# Patient Record
Sex: FEMALE | Race: WHITE
[De-identification: names, ages, dates, MRNs, and addresses within clinical notes are randomized per-mention and may not be internally consistent; named-entity substitution may affect disease eponyms.]

---

## 2017-07-06 ENCOUNTER — HOSPITAL ENCOUNTER (INPATIENT)
Dept: HOSPITAL 47 - EC | Age: 82
LOS: 4 days | Discharge: NURSING, REHAB OR LONG-TERM CARE | DRG: 699 | End: 2017-07-10
Payer: MEDICARE

## 2017-07-06 VITALS — BODY MASS INDEX: 35.8 KG/M2

## 2017-07-06 DIAGNOSIS — Z86.14: ICD-10-CM

## 2017-07-06 DIAGNOSIS — Z79.4: ICD-10-CM

## 2017-07-06 DIAGNOSIS — T83.83XA: Primary | ICD-10-CM

## 2017-07-06 DIAGNOSIS — E11.51: ICD-10-CM

## 2017-07-06 DIAGNOSIS — Z79.899: ICD-10-CM

## 2017-07-06 DIAGNOSIS — N39.0: ICD-10-CM

## 2017-07-06 DIAGNOSIS — M19.042: ICD-10-CM
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
## 2017-07-06 DIAGNOSIS — Z79.82: ICD-10-CM

## 2017-07-06 DIAGNOSIS — Z79.84: ICD-10-CM

## 2017-07-06 DIAGNOSIS — E78.2: ICD-10-CM

## 2017-07-06 DIAGNOSIS — E11.42: ICD-10-CM

## 2017-07-06 DIAGNOSIS — R32: ICD-10-CM

## 2017-07-06 DIAGNOSIS — T83.518A: ICD-10-CM

## 2017-07-06 DIAGNOSIS — Z79.01: ICD-10-CM

## 2017-07-06 DIAGNOSIS — I10: ICD-10-CM
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
## 2017-07-06 DIAGNOSIS — Z85.41: ICD-10-CM

## 2017-07-06 DIAGNOSIS — I48.0: ICD-10-CM

## 2017-07-06 DIAGNOSIS — M19.041: ICD-10-CM

## 2017-07-06 LAB
ALP SERPL-CCNC: 116 U/L (ref 38–126)
ALT SERPL-CCNC: 22 U/L (ref 9–52)
ANION GAP SERPL CALC-SCNC: 10 MMOL/L
APTT BLD: 36.2 SEC (ref 22–30)
AST SERPL-CCNC: 18 U/L (ref 14–36)
BASOPHILS # BLD AUTO: 0.1 K/UL (ref 0–0.2)
BASOPHILS NFR BLD AUTO: 1 %
BUN SERPL-SCNC: 42 MG/DL (ref 7–17)
CALCIUM SPEC-MCNC: 9.1 MG/DL (ref 8.4–10.2)
CH: 27.5
CHCM: 32.3
CHLORIDE SERPL-SCNC: 102 MMOL/L (ref 98–107)
CO2 SERPL-SCNC: 23 MMOL/L (ref 22–30)
EOSINOPHIL # BLD AUTO: 0.4 K/UL (ref 0–0.7)
EOSINOPHIL NFR BLD AUTO: 2 %
ERYTHROCYTE [DISTWIDTH] IN BLOOD BY AUTOMATED COUNT: 3.89 M/UL (ref 3.8–5.4)
ERYTHROCYTE [DISTWIDTH] IN BLOOD: 14.8 % (ref 11.5–15.5)
GLUCOSE SERPL-MCNC: 263 MG/DL (ref 74–99)
HCT VFR BLD AUTO: 33.3 % (ref 34–46)
HDW: 2.38
HGB BLD-MCNC: 11 GM/DL (ref 11.4–16)
INR PPP: 2.8 (ref ?–1.1)
LUC NFR BLD AUTO: 2 %
LYMPHOCYTES # SPEC AUTO: 2.9 K/UL (ref 1–4.8)
LYMPHOCYTES NFR SPEC AUTO: 18 %
MCH RBC QN AUTO: 28.2 PG (ref 25–35)
MCHC RBC AUTO-ENTMCNC: 32.9 G/DL (ref 31–37)
MCV RBC AUTO: 85.5 FL (ref 80–100)
MONOCYTES # BLD AUTO: 1.1 K/UL (ref 0–1)
MONOCYTES NFR BLD AUTO: 7 %
NEUTROPHILS # BLD AUTO: 11.5 K/UL (ref 1.3–7.7)
NEUTROPHILS NFR BLD AUTO: 71 %
NON-AFRICAN AMERICAN GFR(MDRD): 37
PARTICLE COUNT: (no result)
POTASSIUM SERPL-SCNC: 4.2 MMOL/L (ref 3.5–5.1)
PROT SERPL-MCNC: 8.1 G/DL (ref 6.3–8.2)
PT BLD: 26.8 SEC (ref 9–12)
RBC UR QL: >182 /HPF (ref 0–5)
SODIUM SERPL-SCNC: 135 MMOL/L (ref 137–145)
SQUAMOUS UR QL AUTO: 10 /HPF (ref 0–4)
UA BILLING (MACRO VS. MICRO): (no result)
WBC # BLD AUTO: 0.25 10*3/UL
WBC # BLD AUTO: 16.1 K/UL (ref 3.8–10.6)
WBC #/AREA URNS HPF: >182 /HPF (ref 0–5)
WBC (PEROX): 15.63

## 2017-07-06 PROCEDURE — 36415 COLL VENOUS BLD VENIPUNCTURE: CPT

## 2017-07-06 PROCEDURE — 99285 EMERGENCY DEPT VISIT HI MDM: CPT

## 2017-07-06 PROCEDURE — 85610 PROTHROMBIN TIME: CPT

## 2017-07-06 PROCEDURE — 81001 URINALYSIS AUTO W/SCOPE: CPT

## 2017-07-06 PROCEDURE — 96365 THER/PROPH/DIAG IV INF INIT: CPT

## 2017-07-06 PROCEDURE — 83036 HEMOGLOBIN GLYCOSYLATED A1C: CPT

## 2017-07-06 PROCEDURE — 80048 BASIC METABOLIC PNL TOTAL CA: CPT

## 2017-07-06 PROCEDURE — 85730 THROMBOPLASTIN TIME PARTIAL: CPT

## 2017-07-06 PROCEDURE — 85025 COMPLETE CBC W/AUTO DIFF WBC: CPT

## 2017-07-06 PROCEDURE — 80053 COMPREHEN METABOLIC PANEL: CPT

## 2017-07-07 LAB
ALP SERPL-CCNC: 106 U/L (ref 38–126)
ALT SERPL-CCNC: 23 U/L (ref 9–52)
ANION GAP SERPL CALC-SCNC: 11 MMOL/L
AST SERPL-CCNC: 16 U/L (ref 14–36)
BASOPHILS # BLD AUTO: 0.1 K/UL (ref 0–0.2)
BASOPHILS NFR BLD AUTO: 1 %
BUN SERPL-SCNC: 35 MG/DL (ref 7–17)
CALCIUM SPEC-MCNC: 8.9 MG/DL (ref 8.4–10.2)
CH: 26.8
CHCM: 31.5
CHLORIDE SERPL-SCNC: 108 MMOL/L (ref 98–107)
CO2 SERPL-SCNC: 20 MMOL/L (ref 22–30)
EOSINOPHIL # BLD AUTO: 0.2 K/UL (ref 0–0.7)
EOSINOPHIL NFR BLD AUTO: 2 %
ERYTHROCYTE [DISTWIDTH] IN BLOOD BY AUTOMATED COUNT: 3.71 M/UL (ref 3.8–5.4)
ERYTHROCYTE [DISTWIDTH] IN BLOOD: 14.6 % (ref 11.5–15.5)
GLUCOSE BLD-MCNC: 145 MG/DL (ref 75–99)
GLUCOSE BLD-MCNC: 159 MG/DL (ref 75–99)
GLUCOSE BLD-MCNC: 179 MG/DL (ref 75–99)
GLUCOSE BLD-MCNC: 184 MG/DL (ref 75–99)
GLUCOSE BLD-MCNC: 207 MG/DL (ref 75–99)
GLUCOSE SERPL-MCNC: 167 MG/DL (ref 74–99)
HCT VFR BLD AUTO: 31.7 % (ref 34–46)
HDW: 2.36
HEMOGLOBIN A1C: 7.9 % (ref 4.2–6.1)
HGB BLD-MCNC: 10.6 GM/DL (ref 11.4–16)
LUC NFR BLD AUTO: 2 %
LYMPHOCYTES # SPEC AUTO: 2.5 K/UL (ref 1–4.8)
LYMPHOCYTES NFR SPEC AUTO: 21 %
MCH RBC QN AUTO: 28.5 PG (ref 25–35)
MCHC RBC AUTO-ENTMCNC: 33.4 G/DL (ref 31–37)
MCV RBC AUTO: 85.6 FL (ref 80–100)
MONOCYTES # BLD AUTO: 0.8 K/UL (ref 0–1)
MONOCYTES NFR BLD AUTO: 7 %
NEUTROPHILS # BLD AUTO: 8.2 K/UL (ref 1.3–7.7)
NEUTROPHILS NFR BLD AUTO: 69 %
NON-AFRICAN AMERICAN GFR(MDRD): 45
POTASSIUM SERPL-SCNC: 4.2 MMOL/L (ref 3.5–5.1)
PROT SERPL-MCNC: 7.5 G/DL (ref 6.3–8.2)
SODIUM SERPL-SCNC: 139 MMOL/L (ref 137–145)
WBC # BLD AUTO: 0.2 10*3/UL
WBC # BLD AUTO: 11.9 K/UL (ref 3.8–10.6)
WBC (PEROX): 12.16

## 2017-07-07 RX ADMIN — PRAVASTATIN SODIUM SCH MG: 80 TABLET ORAL at 21:24

## 2017-07-07 RX ADMIN — INSULIN LISPRO SCH UNIT: 100 INJECTION, SOLUTION INTRAVENOUS; SUBCUTANEOUS at 17:16

## 2017-07-07 RX ADMIN — LEVOTHYROXINE SODIUM SCH MCG: 50 TABLET ORAL at 21:24

## 2017-07-07 RX ADMIN — THERA TABS SCH EACH: TAB at 08:16

## 2017-07-07 RX ADMIN — LISINOPRIL SCH MG: 10 TABLET ORAL at 21:24

## 2017-07-07 RX ADMIN — ASPIRIN 81 MG CHEWABLE TABLET SCH MG: 81 TABLET CHEWABLE at 21:24

## 2017-07-07 RX ADMIN — INSULIN LISPRO SCH UNIT: 100 INJECTION, SOLUTION INTRAVENOUS; SUBCUTANEOUS at 12:34

## 2017-07-07 RX ADMIN — CEFAZOLIN SCH MLS/HR: 330 INJECTION, POWDER, FOR SOLUTION INTRAMUSCULAR; INTRAVENOUS at 00:52

## 2017-07-07 RX ADMIN — WARFARIN SODIUM SCH MG: 3 TABLET ORAL at 16:55

## 2017-07-07 RX ADMIN — AMILORIDE HYDROCHLORIDE AND HYDROCHLOROTHIAZIDE SCH EACH: 5; 50 TABLET ORAL at 08:16

## 2017-07-07 RX ADMIN — INSULIN LISPRO SCH UNIT: 100 INJECTION, SOLUTION INTRAVENOUS; SUBCUTANEOUS at 08:21

## 2017-07-07 RX ADMIN — INSULIN LISPRO SCH UNIT: 100 INJECTION, SOLUTION INTRAVENOUS; SUBCUTANEOUS at 21:24

## 2017-07-07 RX ADMIN — LISINOPRIL SCH MG: 10 TABLET ORAL at 08:16

## 2017-07-07 RX ADMIN — METOPROLOL TARTRATE SCH MG: 50 TABLET, FILM COATED ORAL at 21:24

## 2017-07-07 RX ADMIN — CEFAZOLIN SCH: 330 INJECTION, POWDER, FOR SOLUTION INTRAMUSCULAR; INTRAVENOUS at 11:26

## 2017-07-07 RX ADMIN — METOPROLOL TARTRATE SCH MG: 50 TABLET, FILM COATED ORAL at 08:16

## 2017-07-07 NOTE — P.HPIM
History of Present Illness


H&P Date: 17


Chief Complaint: Blood and urine





This is a 82-year-old female well-known to me with past medical history noted 

below significant for urinary retention with chronic indwelling Perez catheter 

that presented to the emergency yesterday with blood in her urine.  Patient 

herself is a very poor historian and most of the history was obtained by chart 

review and nursing staff report.  Patient was doing fairly well few days ago 

and yesterday noted large amount of blood in her Preez bag.  Blood was bright 

red.  Patient denies any abdominal pain, fevers or chills, or nausea.  She was 

brought into the emergency room for further evaluation.  She was noted to have 

significant hematuria but her hemoglobin was stable.  Patient remained 

hemodynamically stable.  She is awake and alert.  She was seen and evaluated by 

urology earlier and plan is to exchange the catheter tube larger caliber 

catheter.





Review of Systems





Review of system:


14 points review of systems were obtained and were negative except to what were 

mentioned in the HPI.





Past Medical History


Past Medical History: Atrial Fibrillation, Diabetes Mellitus, Hyperlipidemia, 

Hypertension, Thyroid Disorder


Additional Past Medical History / Comment(s): peripheral neuropathy, UTIs, 

urinary incontinence, OA bilateral hands and back, PVD, cervical cancer


History of Any Multi-Drug Resistant Organisms: MRSA


Date of last positivie culture/infection: 2016


MDRO Source:: RIGHT FOOT


Past Surgical History: Hysterectomy, Tonsillectomy


Additional Past Surgical History / Comment(s): R leg bypass surgery, bilateral 

cataract removal


Past Anesthesia/Blood Transfusion Reactions: No Reported Reaction


Past Psychological History: No Psychological Hx Reported


Smoking Status: Never smoker


Past Alcohol Use History: None Reported


Past Drug Use History: None Reported





- Past Family History


  ** Mother


Family Medical History: CVA/TIA


Additional Family Medical History / Comment(s): Mother  in her 70's.





  ** Father


Family Medical History: Hypertension


Additional Family Medical History / Comment(s): Father  in his 70's.





Medications and Allergies


 Home Medications











 Medication  Instructions  Recorded  Confirmed  Type


 


Aspirin 81 mg PO HS 16 History


 


Levothyroxine Sodium [Synthroid] 50 mcg PO HS 16 History


 


Pravastatin Sodium [Pravachol] 80 mg PO HS 16 History


 


Warfarin [Coumadin] 3 mg PO MOTUWEFRSA 16 History


 


aMILoride-HCTZ 5-50 mg [Moduretic 1 tab PO QAM 16 History





5-50]    


 


amLODIPine [Norvasc] 5 mg PO DAILY 16 History


 


INSULIN LISPRO (humaLOG) [humaLOG See Protocol SQ ACHS 16 History





(formulary)]    


 


Lisinopril [Zestril] 10 mg PO BID 16 History


 


Acetaminophen Tab [Tylenol Tab] 650 mg PO Q4H PRN 17 History


 


Folic Acid/Multivit-Min/Lutein 1 tab PO DAILY 17 History





[Therapeutic-M Tablet]    


 


Insulin Glargine [Lantus] 25 unit SQ HS 17 History


 


Warfarin Sodium [Coumadin] 4 mg PO SUTH 17 History











 Allergies











Allergy/AdvReac Type Severity Reaction Status Date / Time


 


No Known Allergies Allergy   Verified 17 21:58














Physical Exam


Vitals: 


 Vital Signs











  Temp Pulse Pulse Resp BP BP Pulse Ox


 


 17 07:00  98.3 F   78  18   128/62  97


 


 17 02:42  97.9 F   78  20   142/67  100


 


 17 01:21  98.1 F  92   20  133/63   98


 


 17 00:54  98.4 F  84   18  141/65   100


 


 17 23:33   68   18  139/66   94 L


 


 17 21:19  98.1 F  87   18  150/71   100








 Intake and Output











 17





 22:59 06:59 14:59


 


Intake Total   200


 


Output Total  1000 


 


Balance  -1000 200


 


Intake:   


 


  Oral   200


 


Output:   


 


  Urine  1000 


 


Other:   


 


  Voiding Method Indwelling Catheter Indwelling Catheter 


 


  Weight 86.183 kg 90.718 kg 














General:  The patient is awake and alert, in no distress


Eye: there is normal conjunctiva bilaterally.  


Neck:  The neck is supple, there is no  JVD.  


Cardiovascular:  Normal  S1-S2, no S3-S4, no murmurs.


Respiratory: Lungs clear to auscultation bilaterally


Gastrointestinal: Abdomen is soft, nontender


Musculoskeletal:  There is no pedal edema.  


Neurological:. Speech is normal. 


Skin:  Skin is warm and dry 





Results


CBC & Chem 7: 


 17 08:00





 17 08:00


Labs: 


 Abnormal Lab Results - Last 24 Hours (Table)











  17 Range/Units





  22:20 22:20 22:20 


 


WBC  16.1 H    (3.8-10.6)  k/uL


 


RBC     (3.80-5.40)  m/uL


 


Hgb  11.0 L    (11.4-16.0)  gm/dL


 


Hct  33.3 L    (34.0-46.0)  %


 


Neutrophils #  11.5 H    (1.3-7.7)  k/uL


 


Monocytes #  1.1 H    (0-1.0)  k/uL


 


PT    26.8 H  (9.0-12.0)  sec


 


APTT    36.2 H  (22.0-30.0)  sec


 


Sodium   135 L   (137-145)  mmol/L


 


Chloride     ()  mmol/L


 


Carbon Dioxide     (22-30)  mmol/L


 


BUN   42 H   (7-17)  mg/dL


 


Creatinine   1.37 H   (0.52-1.04)  mg/dL


 


Glucose   263 H   (74-99)  mg/dL


 


POC Glucose (mg/dL)     (75-99)  mg/dL


 


Hemoglobin A1c     (4.2-6.1)  %


 


Albumin     (3.5-5.0)  g/dL


 


Urine Appearance     (Clear)  


 


Urine RBC     (0-5)  /hpf


 


Urine WBC     (0-5)  /hpf


 


Urine WBC Clumps     (None)  /hpf


 


Ur Squamous Epith Cells     (0-4)  /hpf


 


Urine Bacteria     (None)  /hpf














  17 Range/Units





  22:20 02:32 07:15 


 


WBC     (3.8-10.6)  k/uL


 


RBC     (3.80-5.40)  m/uL


 


Hgb     (11.4-16.0)  gm/dL


 


Hct     (34.0-46.0)  %


 


Neutrophils #     (1.3-7.7)  k/uL


 


Monocytes #     (0-1.0)  k/uL


 


PT     (9.0-12.0)  sec


 


APTT     (22.0-30.0)  sec


 


Sodium     (137-145)  mmol/L


 


Chloride     ()  mmol/L


 


Carbon Dioxide     (22-30)  mmol/L


 


BUN     (7-17)  mg/dL


 


Creatinine     (0.52-1.04)  mg/dL


 


Glucose     (74-99)  mg/dL


 


POC Glucose (mg/dL)   184 H  179 H  (75-99)  mg/dL


 


Hemoglobin A1c     (4.2-6.1)  %


 


Albumin     (3.5-5.0)  g/dL


 


Urine Appearance  Bloody H    (Clear)  


 


Urine RBC  >182 H    (0-5)  /hpf


 


Urine WBC  >182 H    (0-5)  /hpf


 


Urine WBC Clumps  Many H    (None)  /hpf


 


Ur Squamous Epith Cells  10 H    (0-4)  /hpf


 


Urine Bacteria  Moderate H    (None)  /hpf














  17 Range/Units





  08:00 08:00 08:00 


 


WBC  11.9 H    (3.8-10.6)  k/uL


 


RBC  3.71 L    (3.80-5.40)  m/uL


 


Hgb  10.6 L    (11.4-16.0)  gm/dL


 


Hct  31.7 L    (34.0-46.0)  %


 


Neutrophils #  8.2 H    (1.3-7.7)  k/uL


 


Monocytes #     (0-1.0)  k/uL


 


PT     (9.0-12.0)  sec


 


APTT     (22.0-30.0)  sec


 


Sodium     (137-145)  mmol/L


 


Chloride   108 H   ()  mmol/L


 


Carbon Dioxide   20 L   (22-30)  mmol/L


 


BUN   35 H   (7-17)  mg/dL


 


Creatinine   1.16 H   (0.52-1.04)  mg/dL


 


Glucose   167 H   (74-99)  mg/dL


 


POC Glucose (mg/dL)     (75-99)  mg/dL


 


Hemoglobin A1c    7.9 H  (4.2-6.1)  %


 


Albumin   3.2 L   (3.5-5.0)  g/dL


 


Urine Appearance     (Clear)  


 


Urine RBC     (0-5)  /hpf


 


Urine WBC     (0-5)  /hpf


 


Urine WBC Clumps     (None)  /hpf


 


Ur Squamous Epith Cells     (0-4)  /hpf


 


Urine Bacteria     (None)  /hpf














Thrombosis Risk Factor Assmnt





- Choose All That Apply


Any of the Below Risk Factors Present?: Yes


Each Factor Represents 1 point: Medical pt on bed rest, Obesity (BMI >25), 

Swollen legs (current)


Other Risk Factors: Yes


Each Risk Factor Represents 2 Points: Patient confined to bed


Each Risk Factor Represents 3 Points: Age 75 years or older


Thrombosis Risk Factor Assessment Total Risk Factor Score: 8


Thrombosis Risk Factor Assessment Level: High Risk





Assessment and Plan


Plan: 





1.  Hematuria: Maybe traumatic secondary to Perez catheter.  Now improving and 

clearing up.  Urology consulted, appreciate recommendations.  Hemoglobin is 

stable.


2.  Complicated urinary tract infection: Currently on IV ceftriaxone. 


3.  Paroxysmal atrial fibrillation on anticoagulation with Coumadin


4.  Essential hypertension: Blood pressure well controlled


5.  Type 2 diabetes mellitus


6.  Mixed hyperlipidemia





Today, I reviewed her medication list and lab work results.  I will continue 

with anticoagulation for now as her hematuria appears to be improving anyway 

despite that her INR is 2.8.  I would await further recommendations from 

urology.  Will repeat CBC in the morning.  Exchange Perez catheter as directed.

## 2017-07-07 NOTE — ED
Female Urogenital HPI





- General


Chief complaint: Urogenital


Stated complaint: Blood in Urine


Time Seen by Provider: 17 21:40


Source: patient, EMS, RN notes reviewed


Mode of arrival: EMS





- History of Present Illness


Initial comments: 





Patient is an 82-year-old female presents to the emergency room for evaluation 

of blood in Retana catheter bag.  Patient was brought here from nursing 

facility.  Patient had the same symptoms 3 weeks ago and was on antibiotics.  

Apparently, blood has returned over the past few days.  Patient denies 

abdominal pain.  Patient denies fevers or chills.  Patient denies nausea or 

vomiting. Patient denies chest pain or shortness of breath. Patient is on 

Coumadin for A. fib.





- Related Data


 Home Medications











 Medication  Instructions  Recorded  Confirmed


 


Aspirin 81 mg PO HS 16


 


Levothyroxine Sodium [Synthroid] 50 mcg PO HS 16


 


Pravastatin Sodium [Pravachol] 80 mg PO HS 16


 


Warfarin [Coumadin] 3 mg PO MOTUWEFRSA 16


 


aMILoride-HCTZ 5-50 mg [Moduretic 1 tab PO QAM 16





5-50]   


 


amLODIPine [Norvasc] 5 mg PO DAILY 16


 


INSULIN LISPRO (humaLOG) [humaLOG See Protocol SQ ACHS 16





(formulary)]   


 


Lisinopril [Zestril] 10 mg PO BID 16


 


Acetaminophen Tab [Tylenol Tab] 650 mg PO Q4H PRN 17


 


Folic Acid/Multivit-Min/Lutein 1 tab PO DAILY 17





[Therapeutic-M Tablet]   


 


Insulin Glargine [Lantus] 25 unit SQ HS 17


 


Warfarin Sodium [Coumadin] 4 mg PO SUTH 17








 Previous Rx's











 Medication  Instructions  Recorded


 


Bisacodyl [Dulcolax] 5 mg PO DAILY PRN #30 tablet. 16


 


Metoprolol Tartrate [Lopressor] 100 mg PO BID #1 tab 16


 


sitaGLIPtin [Januvia] 50 mg PO HS #1 tab 16











 Allergies











Allergy/AdvReac Type Severity Reaction Status Date / Time


 


No Known Allergies Allergy   Verified 17 21:58














Review of Systems


ROS Statement: 


Those systems with pertinent positive or pertinent negative responses have been 

documented in the HPI.





ROS Other: All systems not noted in ROS Statement are negative.





Past Medical History


Past Medical History: Atrial Fibrillation, Diabetes Mellitus, Hyperlipidemia, 

Hypertension, Thyroid Disorder


Additional Past Medical History / Comment(s): peripheral neuropathy, UTIs, 

urinary incontinence, OA bilateral hands and back, PVD, cervical cancer


History of Any Multi-Drug Resistant Organisms: MRSA


Date of last positivie culture/infection: 2016


MDRO Source:: RIGHT FOOT


Past Surgical History: Hysterectomy, Tonsillectomy


Additional Past Surgical History / Comment(s): R leg bypass surgery, bilateral 

cataract removal


Past Anesthesia/Blood Transfusion Reactions: No Reported Reaction


Past Psychological History: No Psychological Hx Reported


Smoking Status: Never smoker


Past Alcohol Use History: None Reported


Past Drug Use History: None Reported





- Past Family History


  ** Mother


Family Medical History: CVA/TIA


Additional Family Medical History / Comment(s): Mother  in her 70's.





  ** Father


Family Medical History: Hypertension


Additional Family Medical History / Comment(s): Father  in his 70's.





General Exam





- General Exam Comments


Initial Comments: 





laying in exam room, no acute distress.


General appearance: alert, in no apparent distress


Head exam: Present: atraumatic, normocephalic, normal inspection


Eye exam: Present: normal appearance


ENT exam: Present: normal exam


Neck exam: Present: normal inspection


Respiratory exam: Present: normal lung sounds bilaterally.  Absent: respiratory 

distress


Cardiovascular Exam: Present: regular rate, normal rhythm, normal heart sounds


GI/Abdominal exam: Present: soft, normal bowel sounds.  Absent: distended, 

tenderness, guarding, rebound, rigid


External exam: Present: other (retana catheter in place, blood in urine retana bag

)


Extremities exam: Present: normal inspection


Back exam: Present: normal inspection


Neurological exam: Present: alert, oriented X3, CN II-XII intact, normal gait


Psychiatric exam: Present: normal affect, normal mood


Skin exam: Present: warm, dry, intact, normal color.  Absent: rash





Course


 Vital Signs











  17





  21:19 23:33 00:54


 


Temperature 98.1 F  98.4 F


 


Pulse Rate 87 68 84


 


Respiratory 18 18 18





Rate   


 


Blood Pressure 150/71 139/66 141/65


 


O2 Sat by Pulse 100 94 L 100





Oximetry   














  17





  01:21


 


Temperature 98.1 F


 


Pulse Rate 92


 


Respiratory 20





Rate 


 


Blood Pressure 133/63


 


O2 Sat by Pulse 98





Oximetry 














Medical Decision Making





- Medical Decision Making





Patient is an 82-year-old female presents emergency room for evaluation 

hematuria.  Urinalysis suspicious for urinary tract infection.  WBC elevated.  

Patient started on Rocephin and admitted.  Case discussed with Dr. Lucas.  

Dr. Lucas discussed case with Dr. Lawson who agreed to admit patient.  Will 

consult with urology.





- Lab Data


Result diagrams: 


 17 22:20





 17 22:20


 Lab Results











  17 Range/Units





  22:20 22:20 22:20 


 


WBC  16.1 H    (3.8-10.6)  k/uL


 


RBC  3.89    (3.80-5.40)  m/uL


 


Hgb  11.0 L    (11.4-16.0)  gm/dL


 


Hct  33.3 L    (34.0-46.0)  %


 


MCV  85.5    (80.0-100.0)  fL


 


MCH  28.2    (25.0-35.0)  pg


 


MCHC  32.9    (31.0-37.0)  g/dL


 


RDW  14.8    (11.5-15.5)  %


 


Plt Count  373    (150-450)  k/uL


 


Neutrophils %  71    %


 


Lymphocytes %  18    %


 


Monocytes %  7    %


 


Eosinophils %  2    %


 


Basophils %  1    %


 


Neutrophils #  11.5 H    (1.3-7.7)  k/uL


 


Lymphocytes #  2.9    (1.0-4.8)  k/uL


 


Monocytes #  1.1 H    (0-1.0)  k/uL


 


Eosinophils #  0.4    (0-0.7)  k/uL


 


Basophils #  0.1    (0-0.2)  k/uL


 


PT    26.8 H  (9.0-12.0)  sec


 


INR    2.8  (<1.1)  


 


APTT    36.2 H  (22.0-30.0)  sec


 


Sodium   135 L   (137-145)  mmol/L


 


Potassium   4.2   (3.5-5.1)  mmol/L


 


Chloride   102   ()  mmol/L


 


Carbon Dioxide   23   (22-30)  mmol/L


 


Anion Gap   10   mmol/L


 


BUN   42 H   (7-17)  mg/dL


 


Creatinine   1.37 H   (0.52-1.04)  mg/dL


 


Est GFR (MDRD) Af Amer   45   (>60 ml/min/1.73 sqM)  


 


Est GFR (MDRD) Non-Af   37   (>60 ml/min/1.73 sqM)  


 


Glucose   263 H   (74-99)  mg/dL


 


Calcium   9.1   (8.4-10.2)  mg/dL


 


Total Bilirubin   0.4   (0.2-1.3)  mg/dL


 


AST   18   (14-36)  U/L


 


ALT   22   (9-52)  U/L


 


Alkaline Phosphatase   116   ()  U/L


 


Total Protein   8.1   (6.3-8.2)  g/dL


 


Albumin   3.6   (3.5-5.0)  g/dL


 


Urine Color     


 


Urine Appearance     (Clear)  


 


Urine RBC     (0-5)  /hpf


 


Urine WBC     (0-5)  /hpf


 


Urine WBC Clumps     (None)  /hpf


 


Ur Squamous Epith Cells     (0-4)  /hpf


 


Urine Bacteria     (None)  /hpf














  17 Range/Units





  22:20 


 


WBC   (3.8-10.6)  k/uL


 


RBC   (3.80-5.40)  m/uL


 


Hgb   (11.4-16.0)  gm/dL


 


Hct   (34.0-46.0)  %


 


MCV   (80.0-100.0)  fL


 


MCH   (25.0-35.0)  pg


 


MCHC   (31.0-37.0)  g/dL


 


RDW   (11.5-15.5)  %


 


Plt Count   (150-450)  k/uL


 


Neutrophils %   %


 


Lymphocytes %   %


 


Monocytes %   %


 


Eosinophils %   %


 


Basophils %   %


 


Neutrophils #   (1.3-7.7)  k/uL


 


Lymphocytes #   (1.0-4.8)  k/uL


 


Monocytes #   (0-1.0)  k/uL


 


Eosinophils #   (0-0.7)  k/uL


 


Basophils #   (0-0.2)  k/uL


 


PT   (9.0-12.0)  sec


 


INR   (<1.1)  


 


APTT   (22.0-30.0)  sec


 


Sodium   (137-145)  mmol/L


 


Potassium   (3.5-5.1)  mmol/L


 


Chloride   ()  mmol/L


 


Carbon Dioxide   (22-30)  mmol/L


 


Anion Gap   mmol/L


 


BUN   (7-17)  mg/dL


 


Creatinine   (0.52-1.04)  mg/dL


 


Est GFR (MDRD) Af Amer   (>60 ml/min/1.73 sqM)  


 


Est GFR (MDRD) Non-Af   (>60 ml/min/1.73 sqM)  


 


Glucose   (74-99)  mg/dL


 


Calcium   (8.4-10.2)  mg/dL


 


Total Bilirubin   (0.2-1.3)  mg/dL


 


AST   (14-36)  U/L


 


ALT   (9-52)  U/L


 


Alkaline Phosphatase   ()  U/L


 


Total Protein   (6.3-8.2)  g/dL


 


Albumin   (3.5-5.0)  g/dL


 


Urine Color  Red  


 


Urine Appearance  Bloody H  (Clear)  


 


Urine RBC  >182 H  (0-5)  /hpf


 


Urine WBC  >182 H  (0-5)  /hpf


 


Urine WBC Clumps  Many H  (None)  /hpf


 


Ur Squamous Epith Cells  10 H  (0-4)  /hpf


 


Urine Bacteria  Moderate H  (None)  /hpf














Disposition


Clinical Impression: 


 Urinary tract infection





Disposition: ADMITTED AS IP TO THIS HOSP


Condition: Stable


Decision Date: 17

## 2017-07-07 NOTE — P.GSCN
History of Present Illness


Consult date: 17


Reason for Consult: 





Hematuria


Requesting physician: Murray Lawson


History of present illness: 





She is an 82-year-old woman hospitalized in 2016. At that time, she was 

found to have a UTI, as well as bilateral hydronephrosis secondary to urinary 

retention. She has had a Perez catheter in place since that time, and her 

overall health has improved. A renal ultrasound in 2016 confirmed 

resolution of her hydronephrosis, and she has had an IDC since that time.  The 

catheter is changed monthly, and was last changed approximately 3 days ago.  

Since that time, she has experienced gross hematuria.  She was admitted for 

this reason.  On the morning of my evaluation, the urine is faint pink in color 

without clots. 


 





Review of Systems





- Genitourinary


Genitourinary: Reports difficulty voiding, Reports hematuria





Past Medical History


Past Medical History: Atrial Fibrillation, Diabetes Mellitus, Hyperlipidemia, 

Hypertension, Thyroid Disorder


Additional Past Medical History / Comment(s): peripheral neuropathy, UTIs, 

urinary incontinence, OA bilateral hands and back, PVD, cervical cancer


History of Any Multi-Drug Resistant Organisms: MRSA


Year Discovered:: 2016


MDRO Source:: RIGHT FOOT


Past Surgical History: Hysterectomy, Tonsillectomy


Additional Past Surgical History / Comment(s): R leg bypass surgery, bilateral 

cataract removal


Past Anesthesia/Blood Transfusion Reactions: No Reported Reaction


Past Psychological History: No Psychological Hx Reported


Smoking Status: Never smoker


Past Alcohol Use History: None Reported


Past Drug Use History: None Reported





- Past Family History


  ** Mother


Family Medical History: CVA/TIA


Additional Family Medical History / Comment(s): Mother  in her 70's.





  ** Father


Family Medical History: Hypertension


Additional Family Medical History / Comment(s): Father  in his 70's.





Medications and Allergies


 Home Medications











 Medication  Instructions  Recorded  Confirmed  Type


 


Aspirin 81 mg PO HS 16 History


 


Levothyroxine Sodium [Synthroid] 50 mcg PO HS 16 History


 


Pravastatin Sodium [Pravachol] 80 mg PO HS 16 History


 


Warfarin [Coumadin] 3 mg PO MOTUWEFRSA 16 History


 


aMILoride-HCTZ 5-50 mg [Moduretic 1 tab PO QAM 16 History





5-50]    


 


amLODIPine [Norvasc] 5 mg PO DAILY 16 History


 


INSULIN LISPRO (humaLOG) [humaLOG See Protocol SQ ACHS 16 History





(formulary)]    


 


Lisinopril [Zestril] 10 mg PO BID 16 History


 


Acetaminophen Tab [Tylenol Tab] 650 mg PO Q4H PRN 17 History


 


Folic Acid/Multivit-Min/Lutein 1 tab PO DAILY 17 History





[Therapeutic-M Tablet]    


 


Insulin Glargine [Lantus] 25 unit SQ HS 17 History


 


Warfarin Sodium [Coumadin] 4 mg PO SUTH 17 History











 Allergies











Allergy/AdvReac Type Severity Reaction Status Date / Time


 


No Known Allergies Allergy   Verified 17 21:58














Surgical - Exam


 Vital Signs











Temp Pulse Resp BP Pulse Ox


 


 98.1 F   87   18   150/71   100 


 


 17 21:19  17 21:19  17 21:19  17 21:19  17 21:19














- General


well developed, well nourished, no distress





- Abdomen


Abdomen: soft, non tender, no guarding, no rigid, no rebound





- Psychiatric


oriented to time, oriented to person, oriented to place, speech is normal, 

memory intact





Results





- Labs





 17 08:00





 17 08:00


 Abnormal Lab Results - Last 24 Hours (Table)











  17 Range/Units





  22:20 22:20 22:20 


 


WBC  16.1 H    (3.8-10.6)  k/uL


 


Hgb  11.0 L    (11.4-16.0)  gm/dL


 


Hct  33.3 L    (34.0-46.0)  %


 


Neutrophils #  11.5 H    (1.3-7.7)  k/uL


 


Monocytes #  1.1 H    (0-1.0)  k/uL


 


PT    26.8 H  (9.0-12.0)  sec


 


APTT    36.2 H  (22.0-30.0)  sec


 


Sodium   135 L   (137-145)  mmol/L


 


BUN   42 H   (7-17)  mg/dL


 


Creatinine   1.37 H   (0.52-1.04)  mg/dL


 


Glucose   263 H   (74-99)  mg/dL


 


POC Glucose (mg/dL)     (75-99)  mg/dL


 


Urine Appearance     (Clear)  


 


Urine RBC     (0-5)  /hpf


 


Urine WBC     (0-5)  /hpf


 


Urine WBC Clumps     (None)  /hpf


 


Ur Squamous Epith Cells     (0-4)  /hpf


 


Urine Bacteria     (None)  /hpf














  17 Range/Units





  22:20 02:32 


 


WBC    (3.8-10.6)  k/uL


 


Hgb    (11.4-16.0)  gm/dL


 


Hct    (34.0-46.0)  %


 


Neutrophils #    (1.3-7.7)  k/uL


 


Monocytes #    (0-1.0)  k/uL


 


PT    (9.0-12.0)  sec


 


APTT    (22.0-30.0)  sec


 


Sodium    (137-145)  mmol/L


 


BUN    (7-17)  mg/dL


 


Creatinine    (0.52-1.04)  mg/dL


 


Glucose    (74-99)  mg/dL


 


POC Glucose (mg/dL)   184 H  (75-99)  mg/dL


 


Urine Appearance  Bloody H   (Clear)  


 


Urine RBC  >182 H   (0-5)  /hpf


 


Urine WBC  >182 H   (0-5)  /hpf


 


Urine WBC Clumps  Many H   (None)  /hpf


 


Ur Squamous Epith Cells  10 H   (0-4)  /hpf


 


Urine Bacteria  Moderate H   (None)  /hpf








 Diabetes panel











  17 Range/Units





  22:20 


 


Sodium  135 L  (137-145)  mmol/L


 


Potassium  4.2  (3.5-5.1)  mmol/L


 


Chloride  102  ()  mmol/L


 


Carbon Dioxide  23  (22-30)  mmol/L


 


BUN  42 H  (7-17)  mg/dL


 


Creatinine  1.37 H  (0.52-1.04)  mg/dL


 


Glucose  263 H  (74-99)  mg/dL


 


Calcium  9.1  (8.4-10.2)  mg/dL


 


AST  18  (14-36)  U/L


 


ALT  22  (9-52)  U/L


 


Alkaline Phosphatase  116  ()  U/L


 


Total Protein  8.1  (6.3-8.2)  g/dL


 


Albumin  3.6  (3.5-5.0)  g/dL








 Calcium panel











  17 Range/Units





  22:20 


 


Calcium  9.1  (8.4-10.2)  mg/dL


 


Albumin  3.6  (3.5-5.0)  g/dL








 Pituitary panel











  17 Range/Units





  22:20 


 


Sodium  135 L  (137-145)  mmol/L


 


Potassium  4.2  (3.5-5.1)  mmol/L


 


Chloride  102  ()  mmol/L


 


Carbon Dioxide  23  (22-30)  mmol/L


 


BUN  42 H  (7-17)  mg/dL


 


Creatinine  1.37 H  (0.52-1.04)  mg/dL


 


Glucose  263 H  (74-99)  mg/dL


 


Calcium  9.1  (8.4-10.2)  mg/dL








 Adrenal panel











  17 Range/Units





  22:20 


 


Sodium  135 L  (137-145)  mmol/L


 


Potassium  4.2  (3.5-5.1)  mmol/L


 


Chloride  102  ()  mmol/L


 


Carbon Dioxide  23  (22-30)  mmol/L


 


BUN  42 H  (7-17)  mg/dL


 


Creatinine  1.37 H  (0.52-1.04)  mg/dL


 


Glucose  263 H  (74-99)  mg/dL


 


Calcium  9.1  (8.4-10.2)  mg/dL


 


Total Bilirubin  0.4  (0.2-1.3)  mg/dL


 


AST  18  (14-36)  U/L


 


ALT  22  (9-52)  U/L


 


Alkaline Phosphatase  116  ()  U/L


 


Total Protein  8.1  (6.3-8.2)  g/dL


 


Albumin  3.6  (3.5-5.0)  g/dL














Assessment and Plan


(1) Gross hematuria


Status: Acute   


Plan: 





Mrs. Field has a chronic indwelling Perez catheter and takes Coumadin for 

atrial fibrillation.  Her catheter was changed approximately 3 days ago, and 

she has experienced gross hematuria since that time.  This occurred several 

weeks ago, and she was treated with antibiotics.  I have asked the nursing 

staff to change her Perez catheter, given that she has had problems since this 

catheter was placed.  It is noteworthy that the catheter currently in place has 

a 30 mL balloon, and I have suggested to be replaced with an 18-[place] Perez 

catheter with a 5 mL balloon.  I do not feel that any further evaluation is 

warranted at this time.  I have suggested to Mrs. Field that she follow up with 

me as an outpatient if this is an ongoing problem, in which case I will perform 

cystoscopy to rule out intravesical pathology.  Please notify me if I can be of 

any further assistance during this hospitalization.

## 2017-07-08 LAB
ANION GAP SERPL CALC-SCNC: 13 MMOL/L
BASOPHILS # BLD AUTO: 0.1 K/UL (ref 0–0.2)
BASOPHILS NFR BLD AUTO: 1 %
BUN SERPL-SCNC: 38 MG/DL (ref 7–17)
CALCIUM SPEC-MCNC: 9.4 MG/DL (ref 8.4–10.2)
CH: 26.8
CHCM: 30.9
CHLORIDE SERPL-SCNC: 107 MMOL/L (ref 98–107)
CO2 SERPL-SCNC: 20 MMOL/L (ref 22–30)
EOSINOPHIL # BLD AUTO: 0.3 K/UL (ref 0–0.7)
EOSINOPHIL NFR BLD AUTO: 2 %
ERYTHROCYTE [DISTWIDTH] IN BLOOD BY AUTOMATED COUNT: 3.91 M/UL (ref 3.8–5.4)
ERYTHROCYTE [DISTWIDTH] IN BLOOD: 14.6 % (ref 11.5–15.5)
GLUCOSE BLD-MCNC: 161 MG/DL (ref 75–99)
GLUCOSE BLD-MCNC: 170 MG/DL (ref 75–99)
GLUCOSE BLD-MCNC: 210 MG/DL (ref 75–99)
GLUCOSE BLD-MCNC: 224 MG/DL (ref 75–99)
GLUCOSE SERPL-MCNC: 169 MG/DL (ref 74–99)
HCT VFR BLD AUTO: 34.1 % (ref 34–46)
HDW: 2.31
HGB BLD-MCNC: 11.1 GM/DL (ref 11.4–16)
INR PPP: 2.7 (ref ?–1.1)
LUC NFR BLD AUTO: 2 %
LYMPHOCYTES # SPEC AUTO: 2.2 K/UL (ref 1–4.8)
LYMPHOCYTES NFR SPEC AUTO: 21 %
MCH RBC QN AUTO: 28.3 PG (ref 25–35)
MCHC RBC AUTO-ENTMCNC: 32.5 G/DL (ref 31–37)
MCV RBC AUTO: 87.1 FL (ref 80–100)
MONOCYTES # BLD AUTO: 0.7 K/UL (ref 0–1)
MONOCYTES NFR BLD AUTO: 7 %
NEUTROPHILS # BLD AUTO: 6.9 K/UL (ref 1.3–7.7)
NEUTROPHILS NFR BLD AUTO: 66 %
NON-AFRICAN AMERICAN GFR(MDRD): 48
POTASSIUM SERPL-SCNC: 4.3 MMOL/L (ref 3.5–5.1)
PT BLD: 25.9 SEC (ref 9–12)
SODIUM SERPL-SCNC: 140 MMOL/L (ref 137–145)
WBC # BLD AUTO: 0.26 10*3/UL
WBC # BLD AUTO: 10.5 K/UL (ref 3.8–10.6)
WBC (PEROX): 10.94

## 2017-07-08 RX ADMIN — LISINOPRIL SCH MG: 10 TABLET ORAL at 07:51

## 2017-07-08 RX ADMIN — PRAVASTATIN SODIUM SCH MG: 80 TABLET ORAL at 20:33

## 2017-07-08 RX ADMIN — METOPROLOL TARTRATE SCH MG: 50 TABLET, FILM COATED ORAL at 07:51

## 2017-07-08 RX ADMIN — ASPIRIN 81 MG CHEWABLE TABLET SCH MG: 81 TABLET CHEWABLE at 20:33

## 2017-07-08 RX ADMIN — INSULIN LISPRO SCH UNIT: 100 INJECTION, SOLUTION INTRAVENOUS; SUBCUTANEOUS at 17:36

## 2017-07-08 RX ADMIN — INSULIN LISPRO SCH UNIT: 100 INJECTION, SOLUTION INTRAVENOUS; SUBCUTANEOUS at 07:50

## 2017-07-08 RX ADMIN — WARFARIN SODIUM SCH MG: 3 TABLET ORAL at 17:36

## 2017-07-08 RX ADMIN — INSULIN LISPRO SCH UNIT: 100 INJECTION, SOLUTION INTRAVENOUS; SUBCUTANEOUS at 21:50

## 2017-07-08 RX ADMIN — THERA TABS SCH EACH: TAB at 07:51

## 2017-07-08 RX ADMIN — AMILORIDE HYDROCHLORIDE AND HYDROCHLOROTHIAZIDE SCH EACH: 5; 50 TABLET ORAL at 07:50

## 2017-07-08 RX ADMIN — LISINOPRIL SCH MG: 10 TABLET ORAL at 20:33

## 2017-07-08 RX ADMIN — LEVOTHYROXINE SODIUM SCH MCG: 50 TABLET ORAL at 20:33

## 2017-07-08 RX ADMIN — INSULIN LISPRO SCH UNIT: 100 INJECTION, SOLUTION INTRAVENOUS; SUBCUTANEOUS at 12:28

## 2017-07-08 RX ADMIN — METOPROLOL TARTRATE SCH MG: 50 TABLET, FILM COATED ORAL at 20:33

## 2017-07-08 NOTE — P.PN
Progress Note - Text





Mrs. Field is resting comfortably.  Her urine is clear, and her hemoglobin 

level is stable.  I agree with the plan to transfer her back to the [facility] on 07/10

/2017.  Please notify me if I can be of any further assistance.

## 2017-07-08 NOTE — P.PN
Objective





- Vital Signs


Vital signs: 


 Vital Signs











Temp  97.5 F L  07/08/17 07:00


 


Pulse  79   07/08/17 07:00


 


Resp  18   07/08/17 07:00


 


BP  162/72   07/08/17 07:00


 


Pulse Ox  98   07/08/17 07:00








 Intake & Output











 07/07/17 07/08/17 07/08/17





 18:59 06:59 18:59


 


Intake Total 200 120 


 


Output Total 2450 1500 


 


Balance -2250 -1380 


 


Intake:   


 


  Oral 200 120 


 


Output:   


 


  Urine 2450 1500 


 


Other:   


 


  Voiding Method Indwelling Catheter Indwelling Catheter Indwelling Catheter


 


  # Bowel Movements 0  














- Labs


CBC & Chem 7: 


 07/08/17 07:28





 07/08/17 07:28


Labs: 


 Abnormal Lab Results - Last 24 Hours (Table)











  07/07/17 07/07/17 07/08/17 Range/Units





  16:51 21:02 07:28 


 


Hgb    11.1 L  (11.4-16.0)  gm/dL


 


PT     (9.0-12.0)  sec


 


Carbon Dioxide     (22-30)  mmol/L


 


BUN     (7-17)  mg/dL


 


Creatinine     (0.52-1.04)  mg/dL


 


Glucose     (74-99)  mg/dL


 


POC Glucose (mg/dL)  159 H  207 H   (75-99)  mg/dL














  07/08/17 07/08/17 07/08/17 Range/Units





  07:28 07:28 07:30 


 


Hgb     (11.4-16.0)  gm/dL


 


PT  25.9 H    (9.0-12.0)  sec


 


Carbon Dioxide   20 L   (22-30)  mmol/L


 


BUN   38 H   (7-17)  mg/dL


 


Creatinine   1.09 H   (0.52-1.04)  mg/dL


 


Glucose   169 H   (74-99)  mg/dL


 


POC Glucose (mg/dL)    161 H  (75-99)  mg/dL














  07/08/17 Range/Units





  12:09 


 


Hgb   (11.4-16.0)  gm/dL


 


PT   (9.0-12.0)  sec


 


Carbon Dioxide   (22-30)  mmol/L


 


BUN   (7-17)  mg/dL


 


Creatinine   (0.52-1.04)  mg/dL


 


Glucose   (74-99)  mg/dL


 


POC Glucose (mg/dL)  224 H  (75-99)  mg/dL














Assessment and Plan


Plan: 





1.  Hematuria:  now resolved. Maybe traumatic secondary to Perez catheter.  

Urology consulted, appreciate recommendations.  Hemoglobin is stable.


2.  Complicated urinary tract infection: Currently on IV ceftriaxone. 


3.  Paroxysmal atrial fibrillation on anticoagulation with Coumadin


4.  Essential hypertension: Blood pressure well controlled


5.  Type 2 diabetes mellitus


6.  Mixed hyperlipidemia





Today, I reviewed her medication list and lab work results.  I will continue 

with anticoagulation for now as her hematuria appears to be improving anyway 

despite that her INR is therapeutic.  Will repeat CBC in the morning.  plan to 

return to ECF on Monday

## 2017-07-09 LAB
ANION GAP SERPL CALC-SCNC: 10 MMOL/L
BASOPHILS # BLD AUTO: 0.1 K/UL (ref 0–0.2)
BASOPHILS NFR BLD AUTO: 1 %
BUN SERPL-SCNC: 34 MG/DL (ref 7–17)
CALCIUM SPEC-MCNC: 9.2 MG/DL (ref 8.4–10.2)
CH: 26.9
CHCM: 31.4
CHLORIDE SERPL-SCNC: 109 MMOL/L (ref 98–107)
CO2 SERPL-SCNC: 20 MMOL/L (ref 22–30)
EOSINOPHIL # BLD AUTO: 0.2 K/UL (ref 0–0.7)
EOSINOPHIL NFR BLD AUTO: 3 %
ERYTHROCYTE [DISTWIDTH] IN BLOOD BY AUTOMATED COUNT: 3.69 M/UL (ref 3.8–5.4)
ERYTHROCYTE [DISTWIDTH] IN BLOOD: 14.6 % (ref 11.5–15.5)
GLUCOSE BLD-MCNC: 173 MG/DL (ref 75–99)
GLUCOSE BLD-MCNC: 182 MG/DL (ref 75–99)
GLUCOSE BLD-MCNC: 204 MG/DL (ref 75–99)
GLUCOSE BLD-MCNC: 297 MG/DL (ref 75–99)
GLUCOSE SERPL-MCNC: 179 MG/DL (ref 74–99)
HCT VFR BLD AUTO: 31.7 % (ref 34–46)
HDW: 2.36
HGB BLD-MCNC: 10.2 GM/DL (ref 11.4–16)
INR PPP: 2.9 (ref ?–1.1)
LUC NFR BLD AUTO: 3 %
LYMPHOCYTES # SPEC AUTO: 2.2 K/UL (ref 1–4.8)
LYMPHOCYTES NFR SPEC AUTO: 23 %
MCH RBC QN AUTO: 27.8 PG (ref 25–35)
MCHC RBC AUTO-ENTMCNC: 32.3 G/DL (ref 31–37)
MCV RBC AUTO: 86.1 FL (ref 80–100)
MONOCYTES # BLD AUTO: 0.7 K/UL (ref 0–1)
MONOCYTES NFR BLD AUTO: 7 %
NEUTROPHILS # BLD AUTO: 6.2 K/UL (ref 1.3–7.7)
NEUTROPHILS NFR BLD AUTO: 64 %
NON-AFRICAN AMERICAN GFR(MDRD): 48
POTASSIUM SERPL-SCNC: 3.8 MMOL/L (ref 3.5–5.1)
PT BLD: 27.7 SEC (ref 9–12)
SODIUM SERPL-SCNC: 139 MMOL/L (ref 137–145)
WBC # BLD AUTO: 0.24 10*3/UL
WBC # BLD AUTO: 9.7 K/UL (ref 3.8–10.6)
WBC (PEROX): 10.53

## 2017-07-09 RX ADMIN — INSULIN LISPRO SCH UNIT: 100 INJECTION, SOLUTION INTRAVENOUS; SUBCUTANEOUS at 12:13

## 2017-07-09 RX ADMIN — AMILORIDE HYDROCHLORIDE AND HYDROCHLOROTHIAZIDE SCH EACH: 5; 50 TABLET ORAL at 07:35

## 2017-07-09 RX ADMIN — METOPROLOL TARTRATE SCH MG: 50 TABLET, FILM COATED ORAL at 07:35

## 2017-07-09 RX ADMIN — INSULIN LISPRO SCH UNIT: 100 INJECTION, SOLUTION INTRAVENOUS; SUBCUTANEOUS at 21:28

## 2017-07-09 RX ADMIN — INSULIN LISPRO SCH UNIT: 100 INJECTION, SOLUTION INTRAVENOUS; SUBCUTANEOUS at 07:34

## 2017-07-09 RX ADMIN — ASPIRIN 81 MG CHEWABLE TABLET SCH MG: 81 TABLET CHEWABLE at 21:28

## 2017-07-09 RX ADMIN — LISINOPRIL SCH MG: 10 TABLET ORAL at 07:35

## 2017-07-09 RX ADMIN — LEVOTHYROXINE SODIUM SCH MCG: 50 TABLET ORAL at 21:28

## 2017-07-09 RX ADMIN — THERA TABS SCH EACH: TAB at 07:35

## 2017-07-09 RX ADMIN — LISINOPRIL SCH MG: 10 TABLET ORAL at 21:28

## 2017-07-09 RX ADMIN — INSULIN LISPRO SCH UNIT: 100 INJECTION, SOLUTION INTRAVENOUS; SUBCUTANEOUS at 17:32

## 2017-07-09 RX ADMIN — PRAVASTATIN SODIUM SCH MG: 80 TABLET ORAL at 21:28

## 2017-07-09 RX ADMIN — METOPROLOL TARTRATE SCH MG: 50 TABLET, FILM COATED ORAL at 21:28

## 2017-07-09 NOTE — P.PN
Subjective





No events overnight





Objective





- Vital Signs


Vital signs: 


 Vital Signs











Temp  97.6 F   07/09/17 15:00


 


Pulse  81   07/09/17 15:00


 


Resp  18   07/09/17 15:00


 


BP  134/71   07/09/17 15:00


 


Pulse Ox  99   07/09/17 15:00








 Intake & Output











 07/08/17 07/09/17 07/09/17





 18:59 06:59 18:59


 


Intake Total 240 100 400


 


Output Total 700 1100 800


 


Balance -460 -1000 -400


 


Intake:   


 


  Oral 240 100 400


 


Output:   


 


  Urine 700 1100 800


 


Other:   


 


  Voiding Method Indwelling Catheter Indwelling Catheter Indwelling Catheter


 


  # Bowel Movements 0  0














- Exam





General:  The patient is awake and alert, in no distress


Eye: there is normal conjunctiva bilaterally.  


Neck:  The neck is supple, there is no  JVD.  


Cardiovascular:  Normal  S1-S2, no S3-S4, no murmurs.


Respiratory: Lungs clear to auscultation bilaterally


Gastrointestinal: Abdomen is soft, nontender


Musculoskeletal:  There is no pedal edema.  


Neurological:. Speech is normal. 


Skin:  Skin is warm and dry 





- Labs


CBC & Chem 7: 


 07/09/17 07:21





 07/09/17 07:21


Labs: 


 Abnormal Lab Results - Last 24 Hours (Table)











  07/08/17 07/09/17 07/09/17 Range/Units





  21:31 07:21 07:21 


 


RBC   3.69 L   (3.80-5.40)  m/uL


 


Hgb   10.2 L   (11.4-16.0)  gm/dL


 


Hct   31.7 L   (34.0-46.0)  %


 


PT    27.7 H  (9.0-12.0)  sec


 


Chloride     ()  mmol/L


 


Carbon Dioxide     (22-30)  mmol/L


 


BUN     (7-17)  mg/dL


 


Creatinine     (0.52-1.04)  mg/dL


 


Glucose     (74-99)  mg/dL


 


POC Glucose (mg/dL)  210 H    (75-99)  mg/dL














  07/09/17 07/09/17 07/09/17 Range/Units





  07:21 07:27 11:49 


 


RBC     (3.80-5.40)  m/uL


 


Hgb     (11.4-16.0)  gm/dL


 


Hct     (34.0-46.0)  %


 


PT     (9.0-12.0)  sec


 


Chloride  109 H    ()  mmol/L


 


Carbon Dioxide  20 L    (22-30)  mmol/L


 


BUN  34 H    (7-17)  mg/dL


 


Creatinine  1.09 H    (0.52-1.04)  mg/dL


 


Glucose  179 H    (74-99)  mg/dL


 


POC Glucose (mg/dL)   182 H  204 H  (75-99)  mg/dL














  07/09/17 Range/Units





  17:04 


 


RBC   (3.80-5.40)  m/uL


 


Hgb   (11.4-16.0)  gm/dL


 


Hct   (34.0-46.0)  %


 


PT   (9.0-12.0)  sec


 


Chloride   ()  mmol/L


 


Carbon Dioxide   (22-30)  mmol/L


 


BUN   (7-17)  mg/dL


 


Creatinine   (0.52-1.04)  mg/dL


 


Glucose   (74-99)  mg/dL


 


POC Glucose (mg/dL)  173 H  (75-99)  mg/dL














Assessment and Plan


Plan: 





1.  Hematuria:  now resolved. Maybe traumatic secondary to Perez catheter.  

Urology consulted, appreciate recommendations.  Hemoglobin is stable.


2.  Complicated urinary tract infection: Currently on IV ceftriaxone. 


3.  Paroxysmal atrial fibrillation on anticoagulation with Coumadin


4.  Essential hypertension: Blood pressure well controlled


5.  Type 2 diabetes mellitus


6.  Mixed hyperlipidemia





Today, I reviewed her medication list and lab work results.  I will continue 

with anticoagulation for now as her hematuria appears to be improving anyway 

despite that her INR is therapeutic.  Will repeat CBC in the morning.  plan to 

return to ECF on Monday

## 2017-07-10 VITALS — SYSTOLIC BLOOD PRESSURE: 131 MMHG | TEMPERATURE: 97.4 F | DIASTOLIC BLOOD PRESSURE: 55 MMHG | RESPIRATION RATE: 16 BRPM

## 2017-07-10 VITALS — HEART RATE: 78 BPM

## 2017-07-10 LAB
ANION GAP SERPL CALC-SCNC: 13 MMOL/L
BASOPHILS # BLD AUTO: 0.1 K/UL (ref 0–0.2)
BASOPHILS NFR BLD AUTO: 1 %
BUN SERPL-SCNC: 35 MG/DL (ref 7–17)
CALCIUM SPEC-MCNC: 9.2 MG/DL (ref 8.4–10.2)
CH: 26.7
CHCM: 30.5
CHLORIDE SERPL-SCNC: 106 MMOL/L (ref 98–107)
CO2 SERPL-SCNC: 20 MMOL/L (ref 22–30)
EOSINOPHIL # BLD AUTO: 0.3 K/UL (ref 0–0.7)
EOSINOPHIL NFR BLD AUTO: 3 %
ERYTHROCYTE [DISTWIDTH] IN BLOOD BY AUTOMATED COUNT: 3.83 M/UL (ref 3.8–5.4)
ERYTHROCYTE [DISTWIDTH] IN BLOOD: 14.6 % (ref 11.5–15.5)
GLUCOSE BLD-MCNC: 163 MG/DL (ref 75–99)
GLUCOSE BLD-MCNC: 221 MG/DL (ref 75–99)
GLUCOSE SERPL-MCNC: 189 MG/DL (ref 74–99)
HCT VFR BLD AUTO: 33.6 % (ref 34–46)
HDW: 2.37
HGB BLD-MCNC: 10.9 GM/DL (ref 11.4–16)
INR PPP: 2.7 (ref ?–1.1)
LUC NFR BLD AUTO: 3 %
LYMPHOCYTES # SPEC AUTO: 2.6 K/UL (ref 1–4.8)
LYMPHOCYTES NFR SPEC AUTO: 25 %
MCH RBC QN AUTO: 28.5 PG (ref 25–35)
MCHC RBC AUTO-ENTMCNC: 32.5 G/DL (ref 31–37)
MCV RBC AUTO: 87.8 FL (ref 80–100)
MONOCYTES # BLD AUTO: 0.8 K/UL (ref 0–1)
MONOCYTES NFR BLD AUTO: 7 %
NEUTROPHILS # BLD AUTO: 6.3 K/UL (ref 1.3–7.7)
NEUTROPHILS NFR BLD AUTO: 61 %
NON-AFRICAN AMERICAN GFR(MDRD): 45
POTASSIUM SERPL-SCNC: 3.9 MMOL/L (ref 3.5–5.1)
PT BLD: 26.1 SEC (ref 9–12)
SODIUM SERPL-SCNC: 139 MMOL/L (ref 137–145)
WBC # BLD AUTO: 0.33 10*3/UL
WBC # BLD AUTO: 10.4 K/UL (ref 3.8–10.6)
WBC (PEROX): 8.95

## 2017-07-10 RX ADMIN — AMILORIDE HYDROCHLORIDE AND HYDROCHLOROTHIAZIDE SCH EACH: 5; 50 TABLET ORAL at 07:43

## 2017-07-10 RX ADMIN — METOPROLOL TARTRATE SCH MG: 50 TABLET, FILM COATED ORAL at 07:43

## 2017-07-10 RX ADMIN — THERA TABS SCH EACH: TAB at 07:43

## 2017-07-10 RX ADMIN — LISINOPRIL SCH MG: 10 TABLET ORAL at 07:43

## 2017-07-10 RX ADMIN — INSULIN LISPRO SCH UNIT: 100 INJECTION, SOLUTION INTRAVENOUS; SUBCUTANEOUS at 07:43

## 2017-07-10 RX ADMIN — INSULIN LISPRO SCH UNIT: 100 INJECTION, SOLUTION INTRAVENOUS; SUBCUTANEOUS at 13:03

## 2017-07-10 NOTE — P.DS
Providers


Date of admission: 


07/07/17 01:34





Expected date of discharge: 07/10/17


Attending physician: 


Murray Lawson





Consults: 





 





07/07/17 00:13


Consult Physician Urgent 


   Consulting Provider: Kishor Sweeney


   Consult Reason/Comments: urinary tract infeciton


   Do you want consulting provider notified?: Yes











Primary care physician: 


Murray Lawson





[facility] Course: 





This is a 82-year-old female who presented to the hospital from a nursing home 

with john hematuria.  Patient was admitted and was seen and evaluated by 

urology.  Hemoglobin remained stable.  Her hematuria was thought to be 

attributed to a traumatic Perez insertion.  Her urine cleared within the first 

24 hours.  She was maintained on Coumadin and her INR was therapeutic.  She 

will be discharged back in stable condition.





1.  Hematuria


2.  Complicated urinary tract infection: Will finish antibiotic course with 

Keflex. 


3.  Paroxysmal atrial fibrillation on anticoagulation with Coumadin


4.  Essential hypertension: Blood pressure well controlled


5.  Type 2 diabetes mellitus


6.  Mixed hyperlipidemia


Patient Condition at Discharge: Stable





Plan - Discharge Summary


New Discharge Prescriptions: 


New


   Cephalexin [Keflex] 500 mg PO Q8HR #21 cap





Continue


   Aspirin 81 mg PO HS


   Pravastatin Sodium [Pravachol] 80 mg PO HS


   Warfarin [Coumadin] 3 mg PO MOTUWEFRSA


   Levothyroxine Sodium [Synthroid] 50 mcg PO HS


   aMILoride-HCTZ 5-50 mg [Moduretic 5-50] 1 tab PO QAM


   amLODIPine [Norvasc] 5 mg PO DAILY


   Bisacodyl [Dulcolax] 5 mg PO DAILY PRN #30 tablet.


     PRN Reason: Constipation


   Lisinopril [Zestril] 10 mg PO BID


   INSULIN LISPRO (humaLOG) [humaLOG (formulary)] See Protocol SQ ACHS


   sitaGLIPtin [Januvia] 50 mg PO HS #1 tab


   Metoprolol Tartrate [Lopressor] 100 mg PO BID #1 tab


   Acetaminophen Tab [Tylenol] 650 mg PO Q4H PRN


     PRN Reason: Pain


   Folic Acid/Multivit-Min/Lutein [Therapeutic-M Tablet] 1 tab PO DAILY


   Insulin Glargine [Lantus] 25 unit SQ HS


   Warfarin Sodium [Coumadin] 4 mg PO SUTH


Discharge Medication List





Aspirin 81 mg PO HS 02/08/16 [History]


Levothyroxine Sodium [Synthroid] 50 mcg PO HS 02/08/16 [History]


Pravastatin Sodium [Pravachol] 80 mg PO HS 02/08/16 [History]


Warfarin [Coumadin] 3 mg PO MOTUWEFRSA 02/08/16 [History]


aMILoride-HCTZ 5-50 mg [Moduretic 5-50] 1 tab PO QAM 02/08/16 [History]


amLODIPine [Norvasc] 5 mg PO DAILY 02/08/16 [History]


Bisacodyl [Dulcolax] 5 mg PO DAILY PRN #30 tablet. 05/03/16 [Rx]


INSULIN LISPRO (humaLOG) [humaLOG (formulary)] See Protocol SQ ACHS 06/14/16 [

History]


Lisinopril [Zestril] 10 mg PO BID 06/14/16 [History]


Metoprolol Tartrate [Lopressor] 100 mg PO BID #1 tab 06/21/16 [Rx]


sitaGLIPtin [Januvia] 50 mg PO HS #1 tab 06/21/16 [Rx]


Acetaminophen Tab [Tylenol] 650 mg PO Q4H PRN 07/06/17 [History]


Folic Acid/Multivit-Min/Lutein [Therapeutic-M Tablet] 1 tab PO DAILY 07/06/17 [

History]


Insulin Glargine [Lantus] 25 unit SQ HS 07/06/17 [History]


Warfarin Sodium [Coumadin] 4 mg PO SUTH 07/06/17 [History]


Cephalexin [Keflex] 500 mg PO Q8HR #21 cap 07/10/17 [Rx]








Follow up Appointment(s)/Referral(s): 


Murray Lawson MD [Primary Care Provider] - 1-2 days


Discharge Disposition: TRANSFER TO SNF/ECF

## 2017-07-12 NOTE — CDI
In responding to this query, please exercise your independent professional 
judgment. [facility] Coding Staff and Clinical Documentation Specialists 
appreciate your assistance in clarifying documentation, maintaining compliance 
with coding guidelines, accurately documenting patients condition and 
capturing severity of illness. The fact that a question is asked does not imply 
that any particular answer is desired or expected. Communication forms are a 
method of clarifying documentation and are not made part of the [facility] Health 
Record. Thank you in advance for your clarification.

 Last Revision, November 2015



[facility]

[address] [facility] [address]

[place] [address]



Documentation Clarification Form



Date: 7/12/2017 10:28:00 AM

From: Hailey Doherty

Phone: [phone]

MRN: [id number]

Admit Date: 7/7/2017 1:34:00 AM

Patient Name: Dinah Field

Visit Number: [id number]

Discharge Date:  7/10/17



Dr. Mruray Lawson



82 year old female presents with blood in Perez catheter bag following recent 
catheter change. She was found to have a complicated urinary tract infection. 
Urinalysis: WBC>182, WBC clumps many, bacteria: moderate. Placed on IV 
Ceftriaxone. 



In your professional opinion, can you please clarify the etiology of the UTI, 
if known?

   Perez catheter

   UTI not related to catheter/urostomy

   Other condition, please specify  _________ 

   Unable to determine



If an infective organism is present, please specify cause and effect 
relationship if applicable.



Please document addendum in your discharge summary in order to capture severity 
of illness and risk of mortality. Include clinical findings that support your 
diagnosis.



FYI: Press F11 to launch patient chart



_____ Place X here if this finding has no clinical significance, is not 
applicable or if you are not able to provide any additional documentation.



MICHELLE Roberts, CCS, AHIMA Certified I-10 /Trainer/ II

If you have any questions or concerns please contact Diana Diallo, Coding 
Manager, [facility] @ [phone]



CAUTI
[facility]

## 2017-11-14 ENCOUNTER — HOSPITAL ENCOUNTER (OUTPATIENT)
Dept: HOSPITAL 47 - CATHCVL | Age: 82
Setting detail: OBSERVATION
LOS: 1 days | Discharge: NURSING, REHAB OR LONG-TERM CARE | End: 2017-11-15
Payer: MEDICARE

## 2017-11-14 DIAGNOSIS — L97.519: ICD-10-CM

## 2017-11-14 DIAGNOSIS — Z79.82: ICD-10-CM

## 2017-11-14 DIAGNOSIS — I48.91: ICD-10-CM

## 2017-11-14 DIAGNOSIS — Z79.899: ICD-10-CM

## 2017-11-14 DIAGNOSIS — Z79.4: ICD-10-CM

## 2017-11-14 DIAGNOSIS — E78.5: ICD-10-CM

## 2017-11-14 DIAGNOSIS — I10: ICD-10-CM

## 2017-11-14 DIAGNOSIS — E11.621: ICD-10-CM

## 2017-11-14 DIAGNOSIS — E11.51: ICD-10-CM

## 2017-11-14 DIAGNOSIS — I70.235: Primary | ICD-10-CM

## 2017-11-14 DIAGNOSIS — Z79.01: ICD-10-CM

## 2017-11-14 LAB
GLUCOSE BLD-MCNC: 116 MG/DL (ref 75–99)
GLUCOSE BLD-MCNC: 143 MG/DL (ref 75–99)
GLUCOSE BLD-MCNC: 206 MG/DL (ref 75–99)
INR PPP: 1.4 (ref ?–1.2)
PT BLD: 13.9 SEC (ref 9–12)

## 2017-11-14 PROCEDURE — 80048 BASIC METABOLIC PNL TOTAL CA: CPT

## 2017-11-14 PROCEDURE — 75710 ARTERY X-RAYS ARM/LEG: CPT

## 2017-11-14 PROCEDURE — 85025 COMPLETE CBC W/AUTO DIFF WBC: CPT

## 2017-11-14 PROCEDURE — 85610 PROTHROMBIN TIME: CPT

## 2017-11-14 PROCEDURE — 36200 PLACE CATHETER IN AORTA: CPT

## 2017-11-14 PROCEDURE — 36140 INTRO NDL ICATH UPR/LXTR ART: CPT

## 2017-11-14 PROCEDURE — 83036 HEMOGLOBIN GLYCOSYLATED A1C: CPT

## 2017-11-14 RX ADMIN — INSULIN ASPART SCH UNIT: 100 INJECTION, SOLUTION INTRAVENOUS; SUBCUTANEOUS at 11:10

## 2017-11-14 RX ADMIN — LISINOPRIL SCH MG: 10 TABLET ORAL at 21:52

## 2017-11-14 RX ADMIN — INSULIN ASPART SCH: 100 INJECTION, SOLUTION INTRAVENOUS; SUBCUTANEOUS at 17:57

## 2017-11-14 RX ADMIN — INSULIN ASPART SCH UNIT: 100 INJECTION, SOLUTION INTRAVENOUS; SUBCUTANEOUS at 21:52

## 2017-11-14 RX ADMIN — METOPROLOL TARTRATE SCH MG: 50 TABLET, FILM COATED ORAL at 21:52

## 2017-11-15 VITALS — DIASTOLIC BLOOD PRESSURE: 61 MMHG | TEMPERATURE: 97.9 F | HEART RATE: 101 BPM | SYSTOLIC BLOOD PRESSURE: 136 MMHG

## 2017-11-15 VITALS — RESPIRATION RATE: 18 BRPM

## 2017-11-15 LAB
ANION GAP SERPL CALC-SCNC: 10 MMOL/L
BASOPHILS # BLD AUTO: 0.1 K/UL (ref 0–0.2)
BASOPHILS NFR BLD AUTO: 1 %
BUN SERPL-SCNC: 38 MG/DL (ref 7–17)
CALCIUM SPEC-MCNC: 9.2 MG/DL (ref 8.4–10.2)
CHLORIDE SERPL-SCNC: 110 MMOL/L (ref 98–107)
CO2 SERPL-SCNC: 20 MMOL/L (ref 22–30)
EOSINOPHIL # BLD AUTO: 0.4 K/UL (ref 0–0.7)
EOSINOPHIL NFR BLD AUTO: 4 %
ERYTHROCYTE [DISTWIDTH] IN BLOOD BY AUTOMATED COUNT: 4 M/UL (ref 3.8–5.4)
ERYTHROCYTE [DISTWIDTH] IN BLOOD: 16 % (ref 11.5–15.5)
GLUCOSE BLD-MCNC: 169 MG/DL (ref 75–99)
GLUCOSE SERPL-MCNC: 78 MG/DL (ref 74–99)
HBA1C MFR BLD: 7.5 % (ref 4–6)
HCT VFR BLD AUTO: 35.8 % (ref 34–46)
HGB BLD-MCNC: 11.4 GM/DL (ref 11.4–16)
LYMPHOCYTES # SPEC AUTO: 3 K/UL (ref 1–4.8)
LYMPHOCYTES NFR SPEC AUTO: 30 %
MCH RBC QN AUTO: 28.5 PG (ref 25–35)
MCHC RBC AUTO-ENTMCNC: 31.8 G/DL (ref 31–37)
MCV RBC AUTO: 89.6 FL (ref 80–100)
MONOCYTES # BLD AUTO: 0.8 K/UL (ref 0–1)
MONOCYTES NFR BLD AUTO: 8 %
NEUTROPHILS # BLD AUTO: 5.5 K/UL (ref 1.3–7.7)
NEUTROPHILS NFR BLD AUTO: 56 %
PLATELET # BLD AUTO: 244 K/UL (ref 150–450)
POTASSIUM SERPL-SCNC: 4.1 MMOL/L (ref 3.5–5.1)
SODIUM SERPL-SCNC: 140 MMOL/L (ref 137–145)
WBC # BLD AUTO: 10 K/UL (ref 3.8–10.6)

## 2017-11-15 RX ADMIN — METOPROLOL TARTRATE SCH MG: 50 TABLET, FILM COATED ORAL at 09:28

## 2017-11-15 RX ADMIN — LISINOPRIL SCH MG: 10 TABLET ORAL at 09:28

## 2017-11-15 RX ADMIN — INSULIN ASPART SCH: 100 INJECTION, SOLUTION INTRAVENOUS; SUBCUTANEOUS at 12:31

## 2017-11-15 RX ADMIN — INSULIN ASPART SCH: 100 INJECTION, SOLUTION INTRAVENOUS; SUBCUTANEOUS at 10:53

## 2017-11-15 NOTE — AN
ANGIOGRAPHY REPORT



DATE OF SERVICE:

11/15/2017



PERFORMING PHYSICIAN:

George Cassidy MD, Interventional Cardiologist.



PROCEDURE PERFORMED:

Right lower extremity runoff.



INDICATION:

This is a pleasant 82-year-old  female patient who sees Dr. Tsai as an

outpatient who was diagnosed with what seems to be critical limb ischemia and

nonhealing ulcer involving the right foot.  In view of that, she was scheduled to

undergo a peripheral angiogram.



APPROACH:

Right common femoral artery.



COMPLICATION:

None.



LEVEL OF SEDATION:

Moderate with sedation length of 13 minutes.



PROCEDURE DESCRIPTION:

After obtaining an informed consent, the patient was brought to the Cardiac Cath Lab.

The right common femoral artery was cannulated using micropuncture technique, the

micropuncture wire passed easily, then I placed a 5-Setswana sheath in the right common

femoral artery.  After that, I did right lower extremity runoff using DSA.  The

procedure was completed without any complication.



SELECTIVE PERIPHERAL ANGIOGRAM:

The right common femoral artery is angiographically normal.  It bifurcates into

profunda and SFA.  The right profunda is angiographically normal.  The right SFA

appeared to have mild disease only.  The right popliteal is occluded in the mid

segment.  The occlusion is extending into the right tibioperoneal trunk.  Then the

proximal AT as well as proximal PT are occluded.  The peroneal was not opacified at

all.  The AT and PT occlusions only in the proximal portion.  Down by the foot, I was

able to visualize both the AT and PT.



CONCLUSION:

1. Occluded right popliteal.

2. Occluded tibioperoneal trunk.

3. Two-vessel runoff below the knee with anterior tibial and posterior tibial.



POSTPROCEDURE MANAGEMENT:

The patient is going to follow up with Dr. Tsai who will assess the patient for

revascularization in term of percutaneous or surgical.





MMODL / IJN: [id number] / Job#: [id number]

## 2017-11-15 NOTE — IR
Fluoroscopy

 

HISTORY: Pain

 

2 minutes fluoroscopy time supplied to the referring clinician.  79 intraoperative C-arm images docum
ent the procedure. See dictated report from cardiology.

## 2017-11-15 NOTE — DS
DISCHARGE SUMMARY



DATE OF ADMISSION:

11/14/2017



DATE OF DISCHARGE:

11/15/2017



BRIEF HISTORY:

This is a pleasant 82-year-old  female patient who sees Dr. Tsai as an

outpatient who is known to have peripheral arterial disease and she underwent in the

past a popliteal to pedal bypass for what seems to be critical limb ischemia, was not

doing well and her right foot ulcer was not healing well.  She was seen and evaluated

by Dr. Tsai who recommended proceeding with a peripheral angiogram for better

clarification.



The patient was admitted overnight for IV hydration in view of her low GFR which was

about 35.

Her max contrast was 110 mL.



The patient underwent only right lower extremity runoff and that revealed an occluded

right popliteal and occluded right tibioperoneal trunk.  She has 2-vessel runoff with

AT and PT.



The patient is going to be discharged later on today into an extended care facility and

she will follow up with Dr. Tsai as an outpatient.





MMODL / MADIN: [id number] / Job#: [id number]

## 2018-01-12 ENCOUNTER — HOSPITAL ENCOUNTER (OUTPATIENT)
Dept: HOSPITAL 47 - RADCTMAIN | Age: 83
Discharge: HOME | End: 2018-01-12
Payer: MEDICARE

## 2018-01-12 DIAGNOSIS — N32.89: ICD-10-CM

## 2018-01-12 DIAGNOSIS — N26.1: Primary | ICD-10-CM

## 2018-01-12 PROCEDURE — 74176 CT ABD & PELVIS W/O CONTRAST: CPT

## 2018-01-12 NOTE — CT
EXAMINATION TYPE: CT abdomen pelvis wo con

 

DATE OF EXAM: 1/12/2018

 

COMPARISON: NONE

 

HISTORY: Stones, Chronic bladder pain

 

CT DLP: 824.50 mGycm

Automated exposure control for dose reduction was used.

 

TECHNIQUE:  Helical acquisition of images from the lung bases through the pelvis.

 

FINDINGS: Lack of contrast could compromise sensitivity.

 

LUNG BASES: Similar to prior exam, minimal pleural effusion suspected on the right, some basilar atel
ectasis is noted. Heart is enlarged. There are coronary artery calcifications.

 

AORTA:  No significant abnormality is appreciated.

 

LIVER/GB: No significant abnormality is appreciated.

 

PANCREAS: No significant abnormality is seen.

 

SPLEEN: No significant abnormality is seen.

 

ADRENALS: No significant abnormality is seen.

 

KIDNEYS: Similar to prior exam. Right kidney is atrophic. There is improvement in the bilateral hydro
nephrosis. Cortical cyst present at the lower pole of the left kidney. No evident ureteral calculus.

 

REPRODUCTIVE ORGANS:  Not seen with certainty.

 

URINARY BLADDER:  Catheterized however urine is present within the bladder, urinary bladder shows wal
l thickening

 

BOWEL:  Question some thickening of the rectosigmoid junction.

 

FREE AIR:  No Free Air is visible.

 

ASCITES:  None visible.

 

PELVIC ADENOPATHY:  None visualized.

 

RETROPERITONEAL ADENOPATHY:  No Retroperitoneal Adenopathy visible.

 

OSSEOUS STRUCTURES:  Stable. There is a marked scoliosis, degenerative disc changes in the visualized
 spine.

 

IMPRESSION: 

NONCONTRAST EXAM. THERE IS URINE PRESENT WITHIN THE BLADDER DESPITE CATHETERIZATION, CORRELATE FOR PO
SSIBLE CYSTITIS, THERE IS WALL THICKENING. DIFFICULT TO EXCLUDE A MUCOSAL LESION AT THE RECTOSIGMOID 
JUNCTION OF THE COLON. CONSIDER BOWEL SURVEILLANCE IF THIS HAS NOT BEEN PERFORMED. CORONARY ARTERY DI
SEASE AND CARDIOMEGALY. SMALL RIGHT PLEURAL EFFUSION. INTERVAL RESOLUTION OF PATIENT'S HYDRONEPHROSIS
 BILATERALLY. ATROPHIC RIGHT KIDNEY.

## 2018-10-01 ENCOUNTER — HOSPITAL ENCOUNTER (INPATIENT)
Dept: HOSPITAL 47 - EC | Age: 83
LOS: 4 days | Discharge: NURSING, REHAB OR LONG-TERM CARE | DRG: 698 | End: 2018-10-05
Attending: INTERNAL MEDICINE | Admitting: INTERNAL MEDICINE
Payer: MEDICARE

## 2018-10-01 DIAGNOSIS — T83.511A: Primary | ICD-10-CM

## 2018-10-01 DIAGNOSIS — D63.8: ICD-10-CM

## 2018-10-01 DIAGNOSIS — Z79.82: ICD-10-CM

## 2018-10-01 DIAGNOSIS — E11.621: ICD-10-CM

## 2018-10-01 DIAGNOSIS — M47.9: ICD-10-CM

## 2018-10-01 DIAGNOSIS — L89.302: ICD-10-CM

## 2018-10-01 DIAGNOSIS — I50.9: ICD-10-CM

## 2018-10-01 DIAGNOSIS — N28.1: ICD-10-CM

## 2018-10-01 DIAGNOSIS — E11.22: ICD-10-CM

## 2018-10-01 DIAGNOSIS — R32: ICD-10-CM

## 2018-10-01 DIAGNOSIS — Z90.710: ICD-10-CM

## 2018-10-01 DIAGNOSIS — Z85.41: ICD-10-CM

## 2018-10-01 DIAGNOSIS — Z99.3: ICD-10-CM

## 2018-10-01 DIAGNOSIS — N18.3: ICD-10-CM

## 2018-10-01 DIAGNOSIS — E11.52: ICD-10-CM

## 2018-10-01 DIAGNOSIS — E66.9: ICD-10-CM

## 2018-10-01 DIAGNOSIS — Z79.890: ICD-10-CM

## 2018-10-01 DIAGNOSIS — M19.041: ICD-10-CM

## 2018-10-01 DIAGNOSIS — E78.5: ICD-10-CM

## 2018-10-01 DIAGNOSIS — Z82.49: ICD-10-CM

## 2018-10-01 DIAGNOSIS — A41.9: ICD-10-CM

## 2018-10-01 DIAGNOSIS — I13.0: ICD-10-CM

## 2018-10-01 DIAGNOSIS — D50.9: ICD-10-CM

## 2018-10-01 DIAGNOSIS — L30.9: ICD-10-CM

## 2018-10-01 DIAGNOSIS — N39.0: ICD-10-CM

## 2018-10-01 DIAGNOSIS — K44.9: ICD-10-CM

## 2018-10-01 DIAGNOSIS — Z87.440: ICD-10-CM

## 2018-10-01 DIAGNOSIS — Z79.01: ICD-10-CM

## 2018-10-01 DIAGNOSIS — E87.5: ICD-10-CM

## 2018-10-01 DIAGNOSIS — Z79.899: ICD-10-CM

## 2018-10-01 DIAGNOSIS — I48.0: ICD-10-CM

## 2018-10-01 DIAGNOSIS — E07.9: ICD-10-CM

## 2018-10-01 DIAGNOSIS — Z86.14: ICD-10-CM

## 2018-10-01 DIAGNOSIS — H91.90: ICD-10-CM

## 2018-10-01 DIAGNOSIS — L03.115: ICD-10-CM
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
## 2018-10-01 DIAGNOSIS — I25.10: ICD-10-CM

## 2018-10-01 DIAGNOSIS — Z79.4: ICD-10-CM

## 2018-10-01 DIAGNOSIS — I96: ICD-10-CM

## 2018-10-01 DIAGNOSIS — E87.2: ICD-10-CM

## 2018-10-01 DIAGNOSIS — Z86.19: ICD-10-CM

## 2018-10-01 DIAGNOSIS — M19.042: ICD-10-CM

## 2018-10-01 DIAGNOSIS — Z82.3: ICD-10-CM

## 2018-10-01 DIAGNOSIS — N17.0: ICD-10-CM

## 2018-10-01 DIAGNOSIS — L97.519: ICD-10-CM

## 2018-10-01 DIAGNOSIS — E11.42: ICD-10-CM

## 2018-10-01 LAB
ALBUMIN SERPL-MCNC: 3.6 G/DL (ref 3.5–5)
ALP SERPL-CCNC: 70 U/L (ref 38–126)
ALT SERPL-CCNC: 23 U/L (ref 9–52)
ANION GAP SERPL CALC-SCNC: 12 MMOL/L
APTT BLD: 39.8 SEC (ref 22–30)
AST SERPL-CCNC: 42 U/L (ref 14–36)
BASOPHILS # BLD AUTO: 0.1 K/UL (ref 0–0.2)
BASOPHILS NFR BLD AUTO: 0 %
BUN SERPL-SCNC: 108 MG/DL (ref 7–17)
CALCIUM SPEC-MCNC: 9 MG/DL (ref 8.4–10.2)
CHLORIDE SERPL-SCNC: 95 MMOL/L (ref 98–107)
CO2 SERPL-SCNC: 25 MMOL/L (ref 22–30)
EOSINOPHIL # BLD AUTO: 0.2 K/UL (ref 0–0.7)
EOSINOPHIL NFR BLD AUTO: 1 %
ERYTHROCYTE [DISTWIDTH] IN BLOOD BY AUTOMATED COUNT: 3.6 M/UL (ref 3.8–5.4)
ERYTHROCYTE [DISTWIDTH] IN BLOOD: 13.4 % (ref 11.5–15.5)
GLUCOSE BLD-MCNC: 162 MG/DL (ref 75–99)
GLUCOSE BLD-MCNC: 288 MG/DL (ref 75–99)
GLUCOSE SERPL-MCNC: 144 MG/DL (ref 74–99)
HCT VFR BLD AUTO: 30.6 % (ref 34–46)
HGB BLD-MCNC: 10.3 GM/DL (ref 11.4–16)
INR PPP: 3.1 (ref ?–1.2)
LYMPHOCYTES # SPEC AUTO: 1.9 K/UL (ref 1–4.8)
LYMPHOCYTES NFR SPEC AUTO: 9 %
MCH RBC QN AUTO: 28.6 PG (ref 25–35)
MCHC RBC AUTO-ENTMCNC: 33.6 G/DL (ref 31–37)
MCV RBC AUTO: 85 FL (ref 80–100)
MONOCYTES # BLD AUTO: 1.1 K/UL (ref 0–1)
MONOCYTES NFR BLD AUTO: 5 %
NEUTROPHILS # BLD AUTO: 18.8 K/UL (ref 1.3–7.7)
NEUTROPHILS NFR BLD AUTO: 84 %
PH UR: 7 [PH] (ref 5–8)
PLATELET # BLD AUTO: 248 K/UL (ref 150–450)
POTASSIUM SERPL-SCNC: 5.2 MMOL/L (ref 3.5–5.1)
PROT SERPL-MCNC: 8.3 G/DL (ref 6.3–8.2)
PT BLD: 27.4 SEC (ref 9–12)
RBC UR QL: 3 /HPF (ref 0–5)
SODIUM SERPL-SCNC: 132 MMOL/L (ref 137–145)
SP GR UR: 1.01 (ref 1–1.03)
UROBILINOGEN UR QL STRIP: <2 MG/DL (ref ?–2)
WBC # BLD AUTO: 22.4 K/UL (ref 3.8–10.6)
WBC #/AREA URNS HPF: 13 /HPF (ref 0–5)

## 2018-10-01 PROCEDURE — 87086 URINE CULTURE/COLONY COUNT: CPT

## 2018-10-01 PROCEDURE — 74176 CT ABD & PELVIS W/O CONTRAST: CPT

## 2018-10-01 PROCEDURE — 76770 US EXAM ABDO BACK WALL COMP: CPT

## 2018-10-01 PROCEDURE — 85027 COMPLETE CBC AUTOMATED: CPT

## 2018-10-01 PROCEDURE — 36415 COLL VENOUS BLD VENIPUNCTURE: CPT

## 2018-10-01 PROCEDURE — 99285 EMERGENCY DEPT VISIT HI MDM: CPT

## 2018-10-01 PROCEDURE — 96361 HYDRATE IV INFUSION ADD-ON: CPT

## 2018-10-01 PROCEDURE — 85652 RBC SED RATE AUTOMATED: CPT

## 2018-10-01 PROCEDURE — 86140 C-REACTIVE PROTEIN: CPT

## 2018-10-01 PROCEDURE — 83735 ASSAY OF MAGNESIUM: CPT

## 2018-10-01 PROCEDURE — 96365 THER/PROPH/DIAG IV INF INIT: CPT

## 2018-10-01 PROCEDURE — 73502 X-RAY EXAM HIP UNI 2-3 VIEWS: CPT

## 2018-10-01 PROCEDURE — 83605 ASSAY OF LACTIC ACID: CPT

## 2018-10-01 PROCEDURE — 80053 COMPREHEN METABOLIC PANEL: CPT

## 2018-10-01 PROCEDURE — 83550 IRON BINDING TEST: CPT

## 2018-10-01 PROCEDURE — 87040 BLOOD CULTURE FOR BACTERIA: CPT

## 2018-10-01 PROCEDURE — 83540 ASSAY OF IRON: CPT

## 2018-10-01 PROCEDURE — 85610 PROTHROMBIN TIME: CPT

## 2018-10-01 PROCEDURE — 93005 ELECTROCARDIOGRAM TRACING: CPT

## 2018-10-01 PROCEDURE — 81001 URINALYSIS AUTO W/SCOPE: CPT

## 2018-10-01 PROCEDURE — 80202 ASSAY OF VANCOMYCIN: CPT

## 2018-10-01 PROCEDURE — 85025 COMPLETE CBC W/AUTO DIFF WBC: CPT

## 2018-10-01 PROCEDURE — 85730 THROMBOPLASTIN TIME PARTIAL: CPT

## 2018-10-01 PROCEDURE — 80048 BASIC METABOLIC PNL TOTAL CA: CPT

## 2018-10-01 RX ADMIN — SODIUM CHLORIDE SCH DROPS: 50 SOLUTION/ DROPS OPHTHALMIC at 20:27

## 2018-10-01 RX ADMIN — ASPIRIN 81 MG CHEWABLE TABLET SCH MG: 81 TABLET CHEWABLE at 20:27

## 2018-10-01 RX ADMIN — PRAVASTATIN SODIUM SCH MG: 80 TABLET ORAL at 20:27

## 2018-10-01 RX ADMIN — FAMOTIDINE SCH MG: 20 TABLET, FILM COATED ORAL at 17:06

## 2018-10-01 RX ADMIN — CEFAZOLIN SCH MLS/HR: 330 INJECTION, POWDER, FOR SOLUTION INTRAMUSCULAR; INTRAVENOUS at 17:06

## 2018-10-01 RX ADMIN — METOPROLOL TARTRATE SCH MG: 50 TABLET, FILM COATED ORAL at 20:27

## 2018-10-01 RX ADMIN — INSULIN DETEMIR SCH UNIT: 100 INJECTION, SOLUTION SUBCUTANEOUS at 21:12

## 2018-10-01 RX ADMIN — LINAGLIPTIN SCH MG: 5 TABLET, FILM COATED ORAL at 20:27

## 2018-10-01 NOTE — US
EXAMINATION TYPE: US kidneys/renal and bladder

 

DATE OF EXAM: 10/1/2018

 

COMPARISON: CT

 

CLINICAL HISTORY: GAVIN on CKD , r/o pyelonephritis .

 

EXAM MEASUREMENTS:

 

Right Kidney:  5.2 x 2.7 x 3.1 cm

Left Kidney: 12.1 x 5.3 x 4.9 cm

 

Morbidly obese patient laying RLD unable to move. Technically difficult, somewhat limited study. 

 

 

Right Kidney: not well visualized, atrophied, lower pole obscured by overlying bowel gas, no evident 
hydronephrosis  

Left Kidney: cyst seen 1.9 x 1.3 x 1.6cm, no hydronephrosis

Bladder: not visualized

 

 

There is no evidence for hydronephrosis at this point in time.  No nephrolithiasis is seen.  No eulalia
s are identified.  The urinary bladder is anechoic.  Bilateral ureteral jets are seen.

 

There is no ascites.

 

IMPRESSION:

Atrophic changes of the right kidney as on prior exam. Cystic focus lower pole left kidney is similar
 to prior exam

## 2018-10-01 NOTE — CT
EXAMINATION TYPE: CT abdomen pelvis wo con

 

DATE OF EXAM: 10/1/2018

 

COMPARISON: Prior CT 1/12/2018

 

HISTORY: GAVIN on CKD, right lower quad pain

 

CT DLP: 770.1 mGycm

Automated exposure control for dose reduction was used.

 

TECHNIQUE:  Helical acquisition of images from the lung bases through the pelvis.

 

FINDINGS: Lack of contrast could compromise sensitivity. There is a fixed hiatal hernia present. Part
ial intrathoracic stomach is noted. There are coronary artery calcifications. The heart is enlarged.

 

LUNG BASES: Some minimal dependent atelectatic changes are present. No pleural or pericardial effusio
n.

 

AORTA:  No significant abnormality is appreciated.

 

LIVER/GB: No significant abnormality is appreciated.

 

PANCREAS: No significant abnormality is seen.

 

SPLEEN: No significant abnormality is seen.

 

ADRENALS: No significant abnormality is seen.

 

KIDNEYS: Right kidney is atrophic. Left kidney shows a lower pole cyst as on prior exam.

 

REPRODUCTIVE ORGANS:  No significant abnormality is seen.

 

URINARY BLADDER:  Catheterized. Perez catheter is in place.

Bladder is not distended.

 

 

BOWEL:  No significant abnormality is seen.

 

FREE AIR:  No Free Air is visible.

 

ASCITES:  None visible.

 

PELVIC ADENOPATHY:  None visualized.

 

RETROPERITONEAL ADENOPATHY:  No Retroperitoneal Adenopathy visible.

 

OSSEOUS STRUCTURES:  Degenerative disc changes are present, there is anterolisthesis grade 1 L4-5. Mi
ld anterior wedging present at L3, scoliotic curvature. Multilevel spondylosis is present..

 

IMPRESSION: Noncontrast exam. Atrophic right kidney. Cardiomegaly. Coronary artery disease. Hiatal he
rnia. Additional findings above.

## 2018-10-01 NOTE — US
EXAMINATION TYPE: US venous doppler duplex LE RT

 

DATE OF EXAM: 10/1/2018 1:17 PM

 

COMPARISON: NONE

 

CLINICAL HISTORY: Pain. Edema right leg for 2+ weeks. Discoloration right lower leg. Right leg bypass


 

SIDE PERFORMED: right   

 

TECHNIQUE:  The lower extremity deep venous system is examined utilizing real time linear array sonog
terrance with graded compression, doppler sonography and color-flow sonography.

 

VESSELS IMAGED:

External Iliac Vein (EIV)

Common Femoral Vein

Deep Femoral Vein

Greater Saphenous Vein *

Femoral Vein

Popliteal Vein

Small Saphenous Vein *

Proximal Calf Veins

(* superficial vessels)

 

There is normal flow, compressibility, vascular waveforms. 

 

Right Leg:  No evidence of DVT as visualized 

 

 

 

 

IMPRESSION: No evident deep venous thrombosis at or above the right knee. Follow-up as indicated.

## 2018-10-01 NOTE — ED
Extremity Problem HPI





<Masoud Maya - Last Filed: 10/01/18 14:23>





- General


Source: patient


Mode of arrival: EMS


Limitations: no limitations





<Princess Saleh - Last Filed: 10/01/18 16:01>





- General


Chief complaint: Extremity Problem,Nontraumatic


Stated complaint: Abnormal labs


Time Seen by Provider: 10/01/18 12:00





- History of Present Illness


Initial comments: 


83-year-old female past medical history of Atrial fibrillation on warfarin, 

diabetes, b/l peripheral neuropathy, UTIs, urinary incontinence, peripherall 

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 

CHF presenting today via EMS sent from [facility] where she is a permanent 

resident for right LE erythema, and swelling x few weeks. Pt states that she 

has had chronic issues with her right leg and neuropathy b/l however for weeks 

she has had right LE swelling and redness Erythema was worsening and pt was 

sent for evaluation for DVT by [facility]. Upon arrival pt febrile at 100.0F, 

remainder VS within acceptable limits.


 (Princess Saleh)





- Related Data


 Home Medications











 Medication  Instructions  Recorded  Confirmed


 


Aspirin 81 mg PO HS 02/08/16 10/01/18


 


Levothyroxine Sodium [Synthroid] 50 mcg PO QAM 02/08/16 10/01/18


 


Pravastatin Sodium [Pravachol] 80 mg PO HS 02/08/16 10/01/18


 


Warfarin [Coumadin] 3 mg PO SUMOTUTHSA 02/08/16 10/01/18


 


aMILoride-HCTZ 5-50 mg [Moduretic 1 tab PO DAILY 02/08/16 10/01/18





5-50]   


 


amLODIPine [Norvasc] 5 mg PO DAILY 02/08/16 10/01/18


 


Lisinopril [Zestril] 10 mg PO BID 06/14/16 10/01/18


 


Insulin Glargine [Lantus] 30 unit SQ HS 07/06/17 10/01/18


 


INSULIN LISPRO (humaLOG) [humaLOG] 10 units SQ AC-TID@07,,16 11/13/17 10/01/18


 


Sennosides-Docusate Sodium 1 tab PO DAILY 11/13/17 10/01/18





[Senokot-S]   


 


Acetaminophen Tab [Tylenol Tab] 650 mg PO Q6H PRN 10/01/18 10/01/18


 


Furosemide [Lasix] 60 mg PO BID@08,16 10/01/18 10/01/18


 


Multivitamins, Thera [Multivitamin 1 tab PO DAILY 10/01/18 10/01/18





(formulary)]   


 


Potassium Chloride ER [K-Dur 10] 10 meq PO DAILY 10/01/18 10/01/18


 


Sodium Chloride 5% Ophth Soln 1 drop BOTH EYES BID 10/01/18 10/01/18





[Vahid 128]   


 


Warfarin [Coumadin] 6 mg PO WEFR 10/01/18 10/01/18








 Previous Rx's











 Medication  Instructions  Recorded


 


Metoprolol Tartrate [Lopressor] 100 mg PO BID #1 tab 16


 


sitaGLIPtin [Januvia] 50 mg PO HS #1 tab 16











 Allergies











Allergy/AdvReac Type Severity Reaction Status Date / Time


 


No Known Allergies Allergy   Verified 10/01/18 12:45














Review of Systems


ROS Other: All systems not noted in ROS Statement are negative.





<Masoud Maya - Last Filed: 10/01/18 14:23>


ROS Other: All systems not noted in ROS Statement are negative.


Constitutional: Denies: fever, chills, night sweats


ENT: Denies: ear pain, throat pain


Respiratory: Denies: cough, dyspnea, wheezes, hemoptysis, stridor


Cardiovascular: Denies: chest pain, palpitations, dyspnea on exertion


Gastrointestinal: Denies: abdominal pain, nausea, vomiting, diarrhea, 

constipation


Genitourinary: Denies: urgency, dysuria, frequency, hematuria


Musculoskeletal: Reports: other (right LE swelling, erythema and warmth).  

Denies: back pain


Skin: Reports: as per HPI, change in color.  Denies: rash, lesions


Neurological: Denies: headache, weakness, numbness, paresthesias, confusion, 

abnormal gait





<Princess Saleh - Last Filed: 10/01/18 16:01>


ROS Statement: 


Those systems with pertinent positive or pertinent negative responses have been 

documented in the HPI.








Past Medical History


Past Medical History: Atrial Fibrillation, Diabetes Mellitus, Hyperlipidemia, 

Hypertension, Thyroid Disorder


Additional Past Medical History / Comment(s): peripheral neuropathy, UTIs, 

urinary incontinence, OA bilateral hands and back, PVD, cervical cancer


History of Any Multi-Drug Resistant Organisms: MRSA


Date of last positivie culture/infection: 2016


MDRO Source:: RIGHT FOOT


Past Surgical History: Hysterectomy, Tonsillectomy


Additional Past Surgical History / Comment(s): R leg bypass surgery, bilateral 

cataract removal


Past Anesthesia/Blood Transfusion Reactions: No Reported Reaction


Past Psychological History: No Psychological Hx Reported


Smoking Status: Never smoker


Past Alcohol Use History: None Reported


Past Drug Use History: None Reported





- Past Family History


  ** Mother


Family Medical History: CVA/TIA


Additional Family Medical History / Comment(s): Mother  in her 70's.





  ** Father


Family Medical History: Hypertension


Additional Family Medical History / Comment(s): Father  in his 70's.





<MorganparulPrincess L - Last Filed: 10/01/18 16:01>





General Exam





<Masoud Maya - Last Filed: 10/01/18 14:23>


Limitations: no limitations





<Edna Salehtomasz PIERCE - Last Filed: 10/01/18 16:01>





- General Exam Comments


Initial Comments: 


General:  The patient is awake and alert, in no distress, and does not appear 

acutely ill. Pt is hard of hearing.


Eye:  Pupils are equal, round and reactive to light, extra-ocular movements are 

intact.  No nystagmus.  There is normal conjunctiva bilaterally.  No signs of 

icterus.  


Ears, nose, mouth and throat:  There are moist mucous membranes and no oral 

lesions. 


Neck:  The neck is supple, there is no tenderness or JVD.  


Cardiovascular:  There is a regular rate and rhythm. No murmur, rub or gallop 

is appreciated.


Respiratory:  Lungs are clear to auscultation, respirations are non-labored, 

breath sounds are equal.  No wheezes, stridor, rales, or rhonchi.


Gastrointestinal:  Soft, non-distended, non-tender abdomen without masses or 

organomegaly noted. There is no rebound or guarding present.  No CVA 

tenderness. Bowel sounds are unremarkable.


Musculoskeletal:  Normal ROM of the LE equally b/l, no tenderness.  Strength 5/

5. Pt able to feel deep palpation of the LE bilaterally, but not with light 

touch. Posterior tibial pulses equal bilaterally 2+, unable to palpate DP 

pulses b/l. Extremities are warm to touch b/l. No pallor.


Neurological:  A&O x 3. CN II-XII intact, There are no obvious motor or sensory 

deficits. Coordination appears grossly intact. Speech is normal.


Skin:  Skin is warm and dry. Right LE is sigificantly larger on the left. There 

is erythema that is warm to palpation from ankle to 3inches below the knee 

joint. No  pitting edema b/l. No pain to palpation along the deep venous 

system. (-) Homans b/l. No palpable masses along deep venous system. No areas 

of ulceration. 


Psychiatric:  Cooperative, appropriate mood & affect, normal judgment.  


 (Princess Saleh)


 Vital Signs











  10/01/18 10/01/18





  12:02 13:28


 


Temperature 100.0 F H 


 


Pulse Rate 94 89


 


Respiratory 18 18





Rate  


 


Blood Pressure 120/60 129/59


 


O2 Sat by Pulse 98 99





Oximetry  














Medical Decision Making





- Lab Data


Result diagrams: 


 10/01/18 12:17





 10/01/18 12:17





<Masoud Maya - Last Filed: 10/01/18 14:23>





- Lab Data


Result diagrams: 


 10/01/18 12:17





 10/01/18 12:17





<Princess Saleh - Last Filed: 10/01/18 16:01>





- Medical Decision Making





Patient reevaluated by myself, Dr. Maya.  Patient does have erythema of the 

right lower leg with warmth.  Decreased pulses of the right lower leg however 

this is easily heard with Doppler.  Cap refill 2 seconds.  Patient updated.  

Case was discussed in detail with Dr. Temple who will admit for Dr. Pardo.  She 

does recommend vancomycin and cefepime. (Masoud Maya)


PE as noted above. Pulses diminished but heard easily with doppler. Labs as 

noted above. WBC with source of infection concerning for possible sepsis. 

Elevated BUN/Cr concerning for GAVIN (last Cr 1.48). Lactic Acid WNL.  Urine as 

noted above, concerning for catheter associated infection. Case discusse with 

Dr. Maya and admission decided at this time. Pt given 500mL bolus. Dr. Char benz, Dr. Temple accepted admission for Dr. Pardo after speaking with Dr. Maya, 

at this time we ruled pt met sepsis critera pt started on cefepime and vanco. 

Pt transferred to floor in stable condition.


 (Princess Saleh)





- Lab Data


 Lab Results











  10/01/18 10/01/18 10/01/18 Range/Units





  12:17 12:17 12:17 


 


WBC     (3.8-10.6)  k/uL


 


RBC     (3.80-5.40)  m/uL


 


Hgb     (11.4-16.0)  gm/dL


 


Hct     (34.0-46.0)  %


 


MCV     (80.0-100.0)  fL


 


MCH     (25.0-35.0)  pg


 


MCHC     (31.0-37.0)  g/dL


 


RDW     (11.5-15.5)  %


 


Plt Count     (150-450)  k/uL


 


Neutrophils %     %


 


Lymphocytes %     %


 


Monocytes %     %


 


Eosinophils %     %


 


Basophils %     %


 


Neutrophils #     (1.3-7.7)  k/uL


 


Lymphocytes #     (1.0-4.8)  k/uL


 


Monocytes #     (0-1.0)  k/uL


 


Eosinophils #     (0-0.7)  k/uL


 


Basophils #     (0-0.2)  k/uL


 


PT     (9.0-12.0)  sec


 


INR     (<1.2)  


 


APTT     (22.0-30.0)  sec


 


Sodium    132 L  (137-145)  mmol/L


 


Potassium    5.2 H  (3.5-5.1)  mmol/L


 


Chloride    95 L  ()  mmol/L


 


Carbon Dioxide    25  (22-30)  mmol/L


 


Anion Gap    12  mmol/L


 


BUN    108 H*  (7-17)  mg/dL


 


Creatinine    2.35 H  (0.52-1.04)  mg/dL


 


Est GFR (CKD-EPI)AfAm    21  (>60 ml/min/1.73 sqM)  


 


Est GFR (CKD-EPI)NonAf    19  (>60 ml/min/1.73 sqM)  


 


Glucose    144 H  (74-99)  mg/dL


 


Plasma Lactic Acid Amari  1.4    (0.7-2.0)  mmol/L


 


Calcium    9.0  (8.4-10.2)  mg/dL


 


Total Bilirubin    0.9  (0.2-1.3)  mg/dL


 


AST    42 H  (14-36)  U/L


 


ALT    23  (9-52)  U/L


 


Alkaline Phosphatase    70  ()  U/L


 


Total Protein    8.3 H  (6.3-8.2)  g/dL


 


Albumin    3.6  (3.5-5.0)  g/dL


 


Urine Color   Light Yellow   


 


Urine Appearance   Cloudy H   (Clear)  


 


Urine pH   7.0   (5.0-8.0)  


 


Ur Specific Gravity   1.008   (1.001-1.035)  


 


Urine Protein   Negative   (Negative)  


 


Urine Glucose (UA)   Negative   (Negative)  


 


Urine Ketones   Negative   (Negative)  


 


Urine Blood   Trace H   (Negative)  


 


Urine Nitrite   Positive H   (Negative)  


 


Urine Bilirubin   Negative   (Negative)  


 


Urine Urobilinogen   <2.0   (<2.0)  mg/dL


 


Ur Leukocyte Esterase   Large H   (Negative)  


 


Urine RBC   3   (0-5)  /hpf


 


Urine WBC   13 H   (0-5)  /hpf


 


Amorphous Sediment   Few H   (None)  /hpf


 


Urine Bacteria   Occasional H   (None)  /hpf


 


Urine Mucus   Rare H   (None)  /hpf














  10/01/18 10/01/18 Range/Units





  12:17 12:17 


 


WBC  22.4 H   (3.8-10.6)  k/uL


 


RBC  3.60 L   (3.80-5.40)  m/uL


 


Hgb  10.3 L   (11.4-16.0)  gm/dL


 


Hct  30.6 L   (34.0-46.0)  %


 


MCV  85.0   (80.0-100.0)  fL


 


MCH  28.6   (25.0-35.0)  pg


 


MCHC  33.6   (31.0-37.0)  g/dL


 


RDW  13.4   (11.5-15.5)  %


 


Plt Count  248   (150-450)  k/uL


 


Neutrophils %  84   %


 


Lymphocytes %  9   %


 


Monocytes %  5   %


 


Eosinophils %  1   %


 


Basophils %  0   %


 


Neutrophils #  18.8 H   (1.3-7.7)  k/uL


 


Lymphocytes #  1.9   (1.0-4.8)  k/uL


 


Monocytes #  1.1 H   (0-1.0)  k/uL


 


Eosinophils #  0.2   (0-0.7)  k/uL


 


Basophils #  0.1   (0-0.2)  k/uL


 


PT   27.4 H  (9.0-12.0)  sec


 


INR   3.1 H  (<1.2)  


 


APTT   39.8 H  (22.0-30.0)  sec


 


Sodium    (137-145)  mmol/L


 


Potassium    (3.5-5.1)  mmol/L


 


Chloride    ()  mmol/L


 


Carbon Dioxide    (22-30)  mmol/L


 


Anion Gap    mmol/L


 


BUN    (7-17)  mg/dL


 


Creatinine    (0.52-1.04)  mg/dL


 


Est GFR (CKD-EPI)AfAm    (>60 ml/min/1.73 sqM)  


 


Est GFR (CKD-EPI)NonAf    (>60 ml/min/1.73 sqM)  


 


Glucose    (74-99)  mg/dL


 


Plasma Lactic Acid Amari    (0.7-2.0)  mmol/L


 


Calcium    (8.4-10.2)  mg/dL


 


Total Bilirubin    (0.2-1.3)  mg/dL


 


AST    (14-36)  U/L


 


ALT    (9-52)  U/L


 


Alkaline Phosphatase    ()  U/L


 


Total Protein    (6.3-8.2)  g/dL


 


Albumin    (3.5-5.0)  g/dL


 


Urine Color    


 


Urine Appearance    (Clear)  


 


Urine pH    (5.0-8.0)  


 


Ur Specific Gravity    (1.001-1.035)  


 


Urine Protein    (Negative)  


 


Urine Glucose (UA)    (Negative)  


 


Urine Ketones    (Negative)  


 


Urine Blood    (Negative)  


 


Urine Nitrite    (Negative)  


 


Urine Bilirubin    (Negative)  


 


Urine Urobilinogen    (<2.0)  mg/dL


 


Ur Leukocyte Esterase    (Negative)  


 


Urine RBC    (0-5)  /hpf


 


Urine WBC    (0-5)  /hpf


 


Amorphous Sediment    (None)  /hpf


 


Urine Bacteria    (None)  /hpf


 


Urine Mucus    (None)  /hpf














Disposition





<Masoud Maya - Last Filed: 10/01/18 14:23>


Is patient prescribed a controlled substance at d/c from ED?: No


Time of Disposition: 14:43


Decision Date: 10/01/18


Decision Time: 14:00





<Princess Saleh - Last Filed: 10/01/18 16:01>


Clinical Impression: 


 Sepsis, Cellulitis of right lower limb, Leukocytosis, UTI (urinary tract 

infection)





Disposition: ADMITTED AS IP TO [facility] [place]


Condition: Stable

## 2018-10-01 NOTE — P.HPIM
History of Present Illness


H&P Date: 10/01/18


Chief Complaint: lower extremity swelling and pain 





2 years old  female patient of Dr. Pardo recites them medical large 

with past medical history of atrial fibrillation on Coumadin, diabetes mellitus 

with neuropathy, hyperlipidemia, hypertension, thyroid disorder, history of 

peripheral artery disease status post to right lower extremity bypasses by Dr. Tsai, history of critical limb ischemia and nonhealing ulcer of right 11th 

in 2016 history of UTIs, urinary incontinence history of MRSA in 2016 presents 

with swelling and pain in the right lower extremity.  Patient comes to the ER 

with significant swelling on involving the right lower extremity with erythema 

for the past few weeks.  Arterial study was done as outpatient by Dr. Pardo 

that suggested worsening of the peripheral artery disease.  Venous Doppler was 

done in the ER to rule out DVT that was negative for any occlusion.  Patient 

continues to complain of significant pain in the right lower extremity at the 

level of groin.  Patient is wheelchair bound and is a long-term patient at 

discharge.  Patient is hard of hearing and is unable to provide any history by 

herself history is obtained by the previous documentation and ER documentation.

  On evaluation in the ER, where a suggested temp of 101, tachycardia 101, 

blood pressure 120/60 saturating well on room air.  Lab obtained suggest 

leukocytosis of 22.4, hemoglobin 10.3 which is close to patient's baseline.  

INR obtained suggest 3.1, BNP suggestive sodium 132, potassium 5.2, V1 108 

which is increased from the baseline, creatinine 2.35 and GFR decreased to 21 

from 38 urinalysis obtained as suggest some leukocytosis 13, with positive 

nitrates and leukocyte esterase. 





Review of Systems





Constitutional: Denies chills, Denies  fever, Denies lethargy, Denies malaise, 

Denies poor appetite, Denies weakness, Denies weight loss


Eyes: denies decreased vision, denies diplopia, denies discharge, denies pain


Ears: deny: decreased hearing


Ears, nose, mouth and throat: Denies dental pain, Denies headache, Denies nasal 

discharge, Denies nose pain


Cardiovascular: Denies chest pain, Denies decreased exercise tolerance, Denies 

edema, Denies high blood pressure, Denies irregular heart beat, Denies 

palpitations, Denies paroxysmal nocturnal dyspnea, Denies rapid heart beat, 

Denies shortness of breath


Respiratory: Denies congestion, Denies cough, Denies cough with sputum, Denies 

dyspnea, Denies home oxygen, Denies wheezing


Gastrointestinal: Endorses right lower quadrant abdominal pain, Denies change 

in bowel habits, Denies coffee ground emesis, Denies early satiety, Denies 

excessive gas, Denies heartburn, Denies hematemesis, Denies hematochezia, 

Denies loss of appetite, Denies nausea, Denies vomiting


Genitourinary: Denies dysuria, Denies flank pain, Denies kidney stones, Denies 

menorrhagia, Denies urgency, Denies urinary frequency


Musculoskeletal: Patient is wheelchair bound with limitation of motion.  

Weakness in the lower extremity bilaterally 


Integumentary: Denies rash, Denies wounds, Denies brittle nails, Denies change 

in hair/nails, Denies darkening of skin


Neurological: Denies balance difficulties, Denies change in speech, Denies 

double vision, Denies gait dysfunction, Denies loss of vision, Denies motor 

disturbance, Denies numbness, Denies paralysis, Denies paresthesias, Denies 

seizures


Psychiatric: Denies anxiety, Denies depression


Endocrine: Denies excessive sweating, Denies excessive thirst, Denies high 

blood sugars, Denies palpitations


Hematologic/Lymphatic: Denies easy bruising, Denies lymphadenopathy





Past Medical History


Past Medical History: Atrial Fibrillation, Diabetes Mellitus, Hyperlipidemia, 

Hypertension, Thyroid Disorder


Additional Past Medical History / Comment(s): peripheral neuropathy, UTIs, 

urinary incontinence, OA bilateral hands and back, PVD, cervical cancer


History of Any Multi-Drug Resistant Organisms: MRSA


Date of last positivie culture/infection: 2016


MDRO Source:: RIGHT FOOT


Past Surgical History: Hysterectomy, Tonsillectomy


Additional Past Surgical History / Comment(s): R leg bypass surgery, bilateral 

cataract removal


Past Anesthesia/Blood Transfusion Reactions: No Reported Reaction


Past Psychological History: No Psychological Hx Reported


Smoking Status: Never smoker


Past Alcohol Use History: None Reported


Past Drug Use History: None Reported





- Past Family History


  ** Mother


Family Medical History: CVA/TIA


Additional Family Medical History / Comment(s): Mother  in her 70's.





  ** Father


Family Medical History: Hypertension


Additional Family Medical History / Comment(s): Father  in his 70's.





Medications and Allergies


 Home Medications











 Medication  Instructions  Recorded  Confirmed  Type


 


Aspirin 81 mg PO HS 02/08/16 10/01/18 History


 


Levothyroxine Sodium [Synthroid] 50 mcg PO QAM 02/08/16 10/01/18 History


 


Pravastatin Sodium [Pravachol] 80 mg PO HS 02/08/16 10/01/18 History


 


Warfarin [Coumadin] 3 mg PO SUMOTUTHSA 02/08/16 10/01/18 History


 


aMILoride-HCTZ 5-50 mg [Moduretic 1 tab PO DAILY 02/08/16 10/01/18 History





5-50]    


 


amLODIPine [Norvasc] 5 mg PO DAILY 02/08/16 10/01/18 History


 


Lisinopril [Zestril] 10 mg PO BID 06/14/16 10/01/18 History


 


Metoprolol Tartrate [Lopressor] 100 mg PO BID #1 tab 06/21/16 10/01/18 Rx


 


sitaGLIPtin [Januvia] 50 mg PO HS #1 tab 06/21/16 10/01/18 Rx


 


Insulin Glargine [Lantus] 30 unit SQ HS 07/06/17 10/01/18 History


 


INSULIN LISPRO (humaLOG) [humaLOG] 10 units SQ AC-TID@07,,16 11/13/17 10/01/

18 History


 


Sennosides-Docusate Sodium 1 tab PO DAILY 11/13/17 10/01/18 History





[Senokot-S]    


 


Acetaminophen Tab [Tylenol Tab] 650 mg PO Q6H PRN 10/01/18 10/01/18 History


 


Furosemide [Lasix] 60 mg PO BID@08,16 10/01/18 10/01/18 History


 


Multivitamins, Thera [Multivitamin 1 tab PO DAILY 10/01/18 10/01/18 History





(formulary)]    


 


Potassium Chloride ER [K-Dur 10] 10 meq PO DAILY 10/01/18 10/01/18 History


 


Sodium Chloride 5% Ophth Soln 1 drop BOTH EYES BID 10/01/18 10/01/18 History





[Vahid 128]    


 


Warfarin [Coumadin] 6 mg PO WEFR 10/01/18 10/01/18 History











 Allergies











Allergy/AdvReac Type Severity Reaction Status Date / Time


 


No Known Allergies Allergy   Verified 10/01/18 12:45














Physical Exam


Vitals: 


 Vital Signs











  Temp Pulse Resp BP Pulse Ox


 


 10/01/18 15:31  101 F H  107 H  18  116/59  100


 


 10/01/18 13:28   89  18  129/59  99


 


 10/01/18 12:02  100.0 F H  94  18  120/60  98








 Intake and Output











 10/01/18 10/01/18 10/01/18





 06:59 14:59 22:59


 


Intake Total   500


 


Balance   500


 


Intake:   


 


  Amount of Fluid Infused (   500





  ml)   


 


Other:   


 


  Weight  90.718 kg 














- Constitutional


General appearance: Unable to provide any history, cooperative, moderate acute 

distress, obese





- EENT


Eyes: anicteric sclerae, PERRLA, normal appearance


ENT: Hard of hearing


- Neck


Neck: no lymphadenopathy, normal ROM, no other, no rigidity, no stridor, no 

thyromegaly





- Respiratory


Respiratory: bilateral: CTA, negative: diminished, dullness, rales, rhonchi





- Cardiovascular


Rhythm: regular


Heart sounds: normal: S1, S2


Abnormal Heart Sounds: no systolic murmur, no diastolic murmur, no rub, no S3 

Gallop, no S4 Gallop, no click, no other





- Gastrointestinal


General gastrointestinal: normal bowel sounds, soft





- Integumentary


Integumentary: no rash fourth and fifth right lower extremity digits with the 

necrotic tip with serous drainage and bad odor.  With significant lower 

extremity edema and redness involving the medial side of the thigh and shin 





- Neurologic


Neurologic: CNII-XII intact





- Musculoskeletal


Musculoskeletal: Gait could not be assessed due to generalized weakness, 

strength equal bilaterally decreased in the lower extremity 3+/5





- Psychiatric


Psychiatric: A&O x's3, appropriate affect





Results


CBC & Chem 7: 


 10/01/18 12:17





 10/01/18 12:17


Labs: 


 Abnormal Lab Results - Last 24 Hours (Table)











  10/01/18 10/01/18 10/01/18 Range/Units





  12:17 12:17 12:17 


 


WBC    22.4 H  (3.8-10.6)  k/uL


 


RBC    3.60 L  (3.80-5.40)  m/uL


 


Hgb    10.3 L  (11.4-16.0)  gm/dL


 


Hct    30.6 L  (34.0-46.0)  %


 


Neutrophils #    18.8 H  (1.3-7.7)  k/uL


 


Monocytes #    1.1 H  (0-1.0)  k/uL


 


PT     (9.0-12.0)  sec


 


INR     (<1.2)  


 


APTT     (22.0-30.0)  sec


 


Sodium   132 L   (137-145)  mmol/L


 


Potassium   5.2 H   (3.5-5.1)  mmol/L


 


Chloride   95 L   ()  mmol/L


 


BUN   108 H*   (7-17)  mg/dL


 


Creatinine   2.35 H   (0.52-1.04)  mg/dL


 


Glucose   144 H   (74-99)  mg/dL


 


AST   42 H   (14-36)  U/L


 


Total Protein   8.3 H   (6.3-8.2)  g/dL


 


Urine Appearance  Cloudy H    (Clear)  


 


Urine Blood  Trace H    (Negative)  


 


Urine Nitrite  Positive H    (Negative)  


 


Ur Leukocyte Esterase  Large H    (Negative)  


 


Urine WBC  13 H    (0-5)  /hpf


 


Amorphous Sediment  Few H    (None)  /hpf


 


Urine Bacteria  Occasional H    (None)  /hpf


 


Urine Mucus  Rare H    (None)  /hpf














  10/01/18 Range/Units





  12:17 


 


WBC   (3.8-10.6)  k/uL


 


RBC   (3.80-5.40)  m/uL


 


Hgb   (11.4-16.0)  gm/dL


 


Hct   (34.0-46.0)  %


 


Neutrophils #   (1.3-7.7)  k/uL


 


Monocytes #   (0-1.0)  k/uL


 


PT  27.4 H  (9.0-12.0)  sec


 


INR  3.1 H  (<1.2)  


 


APTT  39.8 H  (22.0-30.0)  sec


 


Sodium   (137-145)  mmol/L


 


Potassium   (3.5-5.1)  mmol/L


 


Chloride   ()  mmol/L


 


BUN   (7-17)  mg/dL


 


Creatinine   (0.52-1.04)  mg/dL


 


Glucose   (74-99)  mg/dL


 


AST   (14-36)  U/L


 


Total Protein   (6.3-8.2)  g/dL


 


Urine Appearance   (Clear)  


 


Urine Blood   (Negative)  


 


Urine Nitrite   (Negative)  


 


Ur Leukocyte Esterase   (Negative)  


 


Urine WBC   (0-5)  /hpf


 


Amorphous Sediment   (None)  /hpf


 


Urine Bacteria   (None)  /hpf


 


Urine Mucus   (None)  /hpf














Thrombosis Risk Factor Assmnt





- DVT/VTE Prophylaxis


DVT/VTE Prophylaxis: Pharmacologic Prophylaxis ordered





Assessment and Plan


Plan: 





#1 sepsis secondary to diabetic ulcer/lower extremity cellulitis.  Temp of 101 

with tachycardia and leukocytosis.  Infectious disease and vascular surgery 

consulted for possible diabetic ulcer/osteomyelitis.  Lower extremity with 

chronic dermatitis changes his possible superimposed infection as patient is 

warm and red to touch in the medial side of the thigh.  Lower extremity Doppler 

was negative for DVT.  Continue vancomycin pharmacy to dose and cefepime





#2.  Necrotic toes fourth and fifth digit on the right secondary to wet 

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  History of 

peripheral artery disease status post bypass and stenting with Dr. Tsai and 

Dr. Wood.  Consult vascular surgery for possible angiogram and debridement.  

Continue aspirin hold Coumadin





#3 paroxysmal atrial fibrillation on Coumadin.  INR 3.1 hold Coumadin today.  

Repeat INR tomorrow





#4 hyperkalemia secondary to acute kidney injury on CK D.  Kayexalate ordered.  

EKG ordered repeat BMP tomorrow hold lisinopril and potassium tablet





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  Status post 2 

L IV fluid continue normal saline at 75 mL per hour.  Hold Lasix, 

hydrochlorothiazide and amiloride.  Hold lisinopril.  Nephrology consult CT 

abdomen ordered to rule out pyelonephritis





#6 UTI urine culture ordered continue cefepime and vancomycin pharmacy to dose





#7 diabetes2 with diabetic neuropathy continue Lantus 30 units daily at bedtime 

with 10 units lispro with sliding scale





#8 hypertension controlled on metoprolol  hold Lasix hold hydrochlorothiazide 

and amiloride.  Hold lisinopril for hyper hyperkalemia





#10 DVT prophylaxis INR therapeutic hold Coumadin INR tomorrow





#11 GI prophylaxis Pepcid 20 mg by mouth daily





#12 CODE STATUS full code





#13 right lower quadrant abdominal pain on movement CT abdomen and pelvis 

ordered to rule out abdominal source of infection and  obstruction





Disposition patient may need 1-2 inpatient night for stabilization

## 2018-10-02 LAB
ALBUMIN SERPL-MCNC: 3.1 G/DL (ref 3.5–5)
ALP SERPL-CCNC: 77 U/L (ref 38–126)
ALT SERPL-CCNC: 18 U/L (ref 9–52)
ANION GAP SERPL CALC-SCNC: 13 MMOL/L
AST SERPL-CCNC: 18 U/L (ref 14–36)
BASOPHILS # BLD AUTO: 0.1 K/UL (ref 0–0.2)
BASOPHILS NFR BLD AUTO: 1 %
BUN SERPL-SCNC: 98 MG/DL (ref 7–17)
CALCIUM SPEC-MCNC: 8.6 MG/DL (ref 8.4–10.2)
CHLORIDE SERPL-SCNC: 100 MMOL/L (ref 98–107)
CO2 SERPL-SCNC: 26 MMOL/L (ref 22–30)
EOSINOPHIL # BLD AUTO: 0.2 K/UL (ref 0–0.7)
EOSINOPHIL NFR BLD AUTO: 1 %
ERYTHROCYTE [DISTWIDTH] IN BLOOD BY AUTOMATED COUNT: 3.53 M/UL (ref 3.8–5.4)
ERYTHROCYTE [DISTWIDTH] IN BLOOD: 13.2 % (ref 11.5–15.5)
GLUCOSE BLD-MCNC: 102 MG/DL (ref 75–99)
GLUCOSE BLD-MCNC: 110 MG/DL (ref 75–99)
GLUCOSE BLD-MCNC: 213 MG/DL (ref 75–99)
GLUCOSE BLD-MCNC: 287 MG/DL (ref 75–99)
GLUCOSE SERPL-MCNC: 107 MG/DL (ref 74–99)
HCT VFR BLD AUTO: 31 % (ref 34–46)
HGB BLD-MCNC: 9.9 GM/DL (ref 11.4–16)
LYMPHOCYTES # SPEC AUTO: 1.3 K/UL (ref 1–4.8)
LYMPHOCYTES NFR SPEC AUTO: 8 %
MCH RBC QN AUTO: 28 PG (ref 25–35)
MCHC RBC AUTO-ENTMCNC: 31.9 G/DL (ref 31–37)
MCV RBC AUTO: 87.8 FL (ref 80–100)
MONOCYTES # BLD AUTO: 0.6 K/UL (ref 0–1)
MONOCYTES NFR BLD AUTO: 4 %
NEUTROPHILS # BLD AUTO: 12.7 K/UL (ref 1.3–7.7)
NEUTROPHILS NFR BLD AUTO: 84 %
PLATELET # BLD AUTO: 242 K/UL (ref 150–450)
POTASSIUM SERPL-SCNC: 3.5 MMOL/L (ref 3.5–5.1)
PROT SERPL-MCNC: 7.2 G/DL (ref 6.3–8.2)
SODIUM SERPL-SCNC: 139 MMOL/L (ref 137–145)
WBC # BLD AUTO: 15.1 K/UL (ref 3.8–10.6)

## 2018-10-02 RX ADMIN — METOPROLOL TARTRATE SCH MG: 50 TABLET, FILM COATED ORAL at 07:39

## 2018-10-02 RX ADMIN — DOCUSATE SODIUM AND SENNOSIDES SCH EACH: 50; 8.6 TABLET ORAL at 07:39

## 2018-10-02 RX ADMIN — LEVOTHYROXINE SODIUM SCH MCG: 50 TABLET ORAL at 05:59

## 2018-10-02 RX ADMIN — INSULIN DETEMIR SCH UNIT: 100 INJECTION, SOLUTION SUBCUTANEOUS at 21:11

## 2018-10-02 RX ADMIN — PRAVASTATIN SODIUM SCH MG: 80 TABLET ORAL at 21:02

## 2018-10-02 RX ADMIN — CEFAZOLIN SCH MLS/HR: 330 INJECTION, POWDER, FOR SOLUTION INTRAMUSCULAR; INTRAVENOUS at 17:32

## 2018-10-02 RX ADMIN — INSULIN ASPART SCH UNIT: 100 INJECTION, SOLUTION INTRAVENOUS; SUBCUTANEOUS at 12:17

## 2018-10-02 RX ADMIN — CEFAZOLIN SCH MLS/HR: 330 INJECTION, POWDER, FOR SOLUTION INTRAMUSCULAR; INTRAVENOUS at 06:00

## 2018-10-02 RX ADMIN — DEXTROSE MONOHYDRATE SCH MLS/HR: 5 INJECTION, SOLUTION INTRAVENOUS at 17:30

## 2018-10-02 RX ADMIN — SODIUM CHLORIDE SCH DROPS: 50 SOLUTION/ DROPS OPHTHALMIC at 21:02

## 2018-10-02 RX ADMIN — METOPROLOL TARTRATE SCH MG: 50 TABLET, FILM COATED ORAL at 21:02

## 2018-10-02 RX ADMIN — THERA TABS SCH EACH: TAB at 12:04

## 2018-10-02 RX ADMIN — SODIUM CHLORIDE SCH DROPS: 50 SOLUTION/ DROPS OPHTHALMIC at 07:38

## 2018-10-02 RX ADMIN — LINAGLIPTIN SCH MG: 5 TABLET, FILM COATED ORAL at 21:02

## 2018-10-02 RX ADMIN — ASPIRIN 81 MG CHEWABLE TABLET SCH MG: 81 TABLET CHEWABLE at 21:02

## 2018-10-02 RX ADMIN — INSULIN ASPART SCH UNIT: 100 INJECTION, SOLUTION INTRAVENOUS; SUBCUTANEOUS at 07:31

## 2018-10-02 RX ADMIN — FAMOTIDINE SCH MG: 20 TABLET, FILM COATED ORAL at 07:39

## 2018-10-02 RX ADMIN — METOPROLOL TARTRATE SCH: 50 TABLET, FILM COATED ORAL at 08:32

## 2018-10-02 RX ADMIN — INSULIN ASPART SCH UNIT: 100 INJECTION, SOLUTION INTRAVENOUS; SUBCUTANEOUS at 17:30

## 2018-10-02 NOTE — P.GSCN
History of Present Illness


Consult date: 10/02/18


Reason for Consult: 





right 4th and 5th toe gangrene


Requesting physician: Radha Escalante


History of present illness: 


83-year-old female past medical history of Atrial fibrillation on warfarin, 

diabetes, b/l peripheral neuropathy, UTIs, urinary incontinence, peripherall 

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 

CHF presented from [facility] where she is a permanent resident for right LE 

erythema, and swelling x few weeks. Pt states that she has had chronic issues 

with her right leg and neuropathy b/l however for weeks she has had right LE 

swelling and redness which was worsening and pt was sent for evaluation for DVT 

by [facility]. Upon arrival pt febrile at 100.0F, remainder VS within acceptable 

limits.  Patients denies any pain at her toes or feet.  She was evaluated and 

noted to have black tips to the 4th and 5th toe.








Review of Systems


All systems: negative





Past Medical History


Past Medical History: Atrial Fibrillation, Diabetes Mellitus, Hyperlipidemia, 

Hypertension, Thyroid Disorder


Additional Past Medical History / Comment(s): peripheral neuropathy, UTIs, 

urinary incontinence, OA bilateral hands and back, PVD, cervical cancer


History of Any Multi-Drug Resistant Organisms: MRSA


Year Discovered:: 2016


MDRO Source:: RIGHT FOOT


Past Surgical History: Hysterectomy, Tonsillectomy


Additional Past Surgical History / Comment(s): R leg bypass surgery, bilateral 

cataract removal


Past Anesthesia/Blood Transfusion Reactions: No Reported Reaction


Past Psychological History: No Psychological Hx Reported


Smoking Status: Never smoker


Past Alcohol Use History: None Reported


Past Drug Use History: None Reported





- Past Family History


  ** Mother


Family Medical History: CVA/TIA


Additional Family Medical History / Comment(s): Mother  in her 70's.





  ** Father


Family Medical History: Hypertension


Additional Family Medical History / Comment(s): Father  in his 70's.





Medications and Allergies


 Home Medications











 Medication  Instructions  Recorded  Confirmed  Type


 


Aspirin 81 mg PO HS 02/08/16 10/01/18 History


 


Levothyroxine Sodium [Synthroid] 50 mcg PO QAM 02/08/16 10/01/18 History


 


Pravastatin Sodium [Pravachol] 80 mg PO HS 02/08/16 10/01/18 History


 


Warfarin [Coumadin] 3 mg PO SUMOTUTHSA 02/08/16 10/01/18 History


 


aMILoride-HCTZ 5-50 mg [Moduretic 1 tab PO DAILY 02/08/16 10/01/18 History





5-50]    


 


amLODIPine [Norvasc] 5 mg PO DAILY 02/08/16 10/01/18 History


 


Lisinopril [Zestril] 10 mg PO BID 06/14/16 10/01/18 History


 


Metoprolol Tartrate [Lopressor] 100 mg PO BID #1 tab 06/21/16 10/01/18 Rx


 


sitaGLIPtin [Januvia] 50 mg PO HS #1 tab 06/21/16 10/01/18 Rx


 


Insulin Glargine [Lantus] 30 unit SQ HS 07/06/17 10/01/18 History


 


INSULIN LISPRO (humaLOG) [humaLOG] 10 units SQ AC-TID@07,,16 11/13/17 10/01/

18 History


 


Sennosides-Docusate Sodium 1 tab PO DAILY 11/13/17 10/01/18 History





[Senokot-S]    


 


Acetaminophen Tab [Tylenol Tab] 650 mg PO Q6H PRN 10/01/18 10/01/18 History


 


Furosemide [Lasix] 60 mg PO BID@08,16 10/01/18 10/01/18 History


 


Multivitamins, Thera [Multivitamin 1 tab PO DAILY 10/01/18 10/01/18 History





(formulary)]    


 


Potassium Chloride ER [K-Dur 10] 10 meq PO DAILY 10/01/18 10/01/18 History


 


Sodium Chloride 5% Ophth Soln 1 drop BOTH EYES BID 10/01/18 10/01/18 History





[Vahid 128]    


 


Warfarin [Coumadin] 6 mg PO WEFR 10/01/18 10/01/18 History











 Allergies











Allergy/AdvReac Type Severity Reaction Status Date / Time


 


No Known Allergies Allergy   Verified 10/01/18 12:45














Surgical - Exam


 Vital Signs











Temp Pulse Resp BP Pulse Ox


 


 100.0 F H  94   18   120/60   98 


 


 10/01/18 12:02  10/01/18 12:02  10/01/18 12:02  10/01/18 12:02  10/01/18 12:02











+dp multiphasic signal right lower extremity.  Multiphasic dp/pt signals of the 

left lower extremity.


Multiple scars noted on right lower leg consistent with multiple bypasses.


Foot is warm.  There are two small distal areas of dry gangrene involving the 

right 4th and 5th toes, no purulent drainage from these areas, no fluctuance.  

Minimal erythema noted but doesn't extend to the foot or leg.





Shallow ulcers noted on the sacral area and back x 4 consistent with stage I 

and II.  Subcutaneous wounds without fat, muscle or bone exposure.





Patient is wheelchair bound.  Does move foot and lower legs with ease.





- General


well developed, no distress, no pain





- Eyes


PERRL, normal ocular movement





- ENT


normal pinna, normal nares





- Neck


no masses





- Respiratory


normal expansion





- Cardiovascular


Rhythm: regularly irregular





- Abdomen


Abdomen: soft, non tender, no rebound, no distended





- Integumentary


no rash, no growths





- Neurologic


normal coordination, no normal sensation





- Psychiatric


oriented to time, oriented to person





Results





- Labs





 10/02/18 08:20





 10/02/18 08:20


 Abnormal Lab Results - Last 24 Hours (Table)











  10/01/18 10/01/18 10/01/18 Range/Units





  12:17 12:17 12:17 


 


WBC    22.4 H  (3.8-10.6)  k/uL


 


RBC    3.60 L  (3.80-5.40)  m/uL


 


Hgb    10.3 L  (11.4-16.0)  gm/dL


 


Hct    30.6 L  (34.0-46.0)  %


 


Neutrophils #    18.8 H  (1.3-7.7)  k/uL


 


Monocytes #    1.1 H  (0-1.0)  k/uL


 


ESR     (0-20)  mm/hr


 


PT     (9.0-12.0)  sec


 


INR     (<1.2)  


 


APTT     (22.0-30.0)  sec


 


Sodium   132 L   (137-145)  mmol/L


 


Potassium   5.2 H   (3.5-5.1)  mmol/L


 


Chloride   95 L   ()  mmol/L


 


BUN   108 H*   (7-17)  mg/dL


 


Creatinine   2.35 H   (0.52-1.04)  mg/dL


 


Glucose   144 H   (74-99)  mg/dL


 


POC Glucose (mg/dL)     (75-99)  mg/dL


 


AST   42 H   (14-36)  U/L


 


C-Reactive Protein     (<10.0)  mg/L


 


Total Protein   8.3 H   (6.3-8.2)  g/dL


 


Albumin     (3.5-5.0)  g/dL


 


Urine Appearance  Cloudy H    (Clear)  


 


Urine Blood  Trace H    (Negative)  


 


Urine Nitrite  Positive H    (Negative)  


 


Ur Leukocyte Esterase  Large H    (Negative)  


 


Urine WBC  13 H    (0-5)  /hpf


 


Amorphous Sediment  Few H    (None)  /hpf


 


Urine Bacteria  Occasional H    (None)  /hpf


 


Urine Mucus  Rare H    (None)  /hpf














  10/01/18 10/01/18 10/01/18 Range/Units





  12:17 15:27 15:27 


 


WBC     (3.8-10.6)  k/uL


 


RBC     (3.80-5.40)  m/uL


 


Hgb     (11.4-16.0)  gm/dL


 


Hct     (34.0-46.0)  %


 


Neutrophils #     (1.3-7.7)  k/uL


 


Monocytes #     (0-1.0)  k/uL


 


ESR   110 H   (0-20)  mm/hr


 


PT  27.4 H    (9.0-12.0)  sec


 


INR  3.1 H    (<1.2)  


 


APTT  39.8 H    (22.0-30.0)  sec


 


Sodium     (137-145)  mmol/L


 


Potassium     (3.5-5.1)  mmol/L


 


Chloride     ()  mmol/L


 


BUN     (7-17)  mg/dL


 


Creatinine     (0.52-1.04)  mg/dL


 


Glucose     (74-99)  mg/dL


 


POC Glucose (mg/dL)     (75-99)  mg/dL


 


AST     (14-36)  U/L


 


C-Reactive Protein    83.6 H  (<10.0)  mg/L


 


Total Protein     (6.3-8.2)  g/dL


 


Albumin     (3.5-5.0)  g/dL


 


Urine Appearance     (Clear)  


 


Urine Blood     (Negative)  


 


Urine Nitrite     (Negative)  


 


Ur Leukocyte Esterase     (Negative)  


 


Urine WBC     (0-5)  /hpf


 


Amorphous Sediment     (None)  /hpf


 


Urine Bacteria     (None)  /hpf


 


Urine Mucus     (None)  /hpf














  10/01/18 10/01/18 10/02/18 Range/Units





  17:16 21:02 07:14 


 


WBC     (3.8-10.6)  k/uL


 


RBC     (3.80-5.40)  m/uL


 


Hgb     (11.4-16.0)  gm/dL


 


Hct     (34.0-46.0)  %


 


Neutrophils #     (1.3-7.7)  k/uL


 


Monocytes #     (0-1.0)  k/uL


 


ESR     (0-20)  mm/hr


 


PT     (9.0-12.0)  sec


 


INR     (<1.2)  


 


APTT     (22.0-30.0)  sec


 


Sodium     (137-145)  mmol/L


 


Potassium     (3.5-5.1)  mmol/L


 


Chloride     ()  mmol/L


 


BUN     (7-17)  mg/dL


 


Creatinine     (0.52-1.04)  mg/dL


 


Glucose     (74-99)  mg/dL


 


POC Glucose (mg/dL)  162 H  288 H  102 H  (75-99)  mg/dL


 


AST     (14-36)  U/L


 


C-Reactive Protein     (<10.0)  mg/L


 


Total Protein     (6.3-8.2)  g/dL


 


Albumin     (3.5-5.0)  g/dL


 


Urine Appearance     (Clear)  


 


Urine Blood     (Negative)  


 


Urine Nitrite     (Negative)  


 


Ur Leukocyte Esterase     (Negative)  


 


Urine WBC     (0-5)  /hpf


 


Amorphous Sediment     (None)  /hpf


 


Urine Bacteria     (None)  /hpf


 


Urine Mucus     (None)  /hpf














  10/02/18 10/02/18 Range/Units





  08:20 08:20 


 


WBC  15.1 H   (3.8-10.6)  k/uL


 


RBC  3.53 L   (3.80-5.40)  m/uL


 


Hgb  9.9 L   (11.4-16.0)  gm/dL


 


Hct  31.0 L   (34.0-46.0)  %


 


Neutrophils #  12.7 H   (1.3-7.7)  k/uL


 


Monocytes #    (0-1.0)  k/uL


 


ESR    (0-20)  mm/hr


 


PT    (9.0-12.0)  sec


 


INR    (<1.2)  


 


APTT    (22.0-30.0)  sec


 


Sodium    (137-145)  mmol/L


 


Potassium    (3.5-5.1)  mmol/L


 


Chloride    ()  mmol/L


 


BUN   98 H  (7-17)  mg/dL


 


Creatinine   2.44 H  (0.52-1.04)  mg/dL


 


Glucose   107 H  (74-99)  mg/dL


 


POC Glucose (mg/dL)    (75-99)  mg/dL


 


AST    (14-36)  U/L


 


C-Reactive Protein    (<10.0)  mg/L


 


Total Protein    (6.3-8.2)  g/dL


 


Albumin   3.1 L  (3.5-5.0)  g/dL


 


Urine Appearance    (Clear)  


 


Urine Blood    (Negative)  


 


Urine Nitrite    (Negative)  


 


Ur Leukocyte Esterase    (Negative)  


 


Urine WBC    (0-5)  /hpf


 


Amorphous Sediment    (None)  /hpf


 


Urine Bacteria    (None)  /hpf


 


Urine Mucus    (None)  /hpf








 Diabetes panel











  10/01/18 10/02/18 Range/Units





  12:17 08:20 


 


Sodium  132 L  139  (137-145)  mmol/L


 


Potassium  5.2 H  3.5  (3.5-5.1)  mmol/L


 


Chloride  95 L  100  ()  mmol/L


 


Carbon Dioxide  25  26  (22-30)  mmol/L


 


BUN  108 H*  98 H  (7-17)  mg/dL


 


Creatinine  2.35 H  2.44 H  (0.52-1.04)  mg/dL


 


Glucose  144 H  107 H  (74-99)  mg/dL


 


Calcium  9.0  8.6  (8.4-10.2)  mg/dL


 


AST  42 H  18  (14-36)  U/L


 


ALT  23  18  (9-52)  U/L


 


Alkaline Phosphatase  70  77  ()  U/L


 


Total Protein  8.3 H  7.2  (6.3-8.2)  g/dL


 


Albumin  3.6  3.1 L  (3.5-5.0)  g/dL








 Calcium panel











  10/01/18 10/02/18 Range/Units





  12:17 08:20 


 


Calcium  9.0  8.6  (8.4-10.2)  mg/dL


 


Albumin  3.6  3.1 L  (3.5-5.0)  g/dL








 Pituitary panel











  10/01/18 10/02/18 Range/Units





  12:17 08:20 


 


Sodium  132 L  139  (137-145)  mmol/L


 


Potassium  5.2 H  3.5  (3.5-5.1)  mmol/L


 


Chloride  95 L  100  ()  mmol/L


 


Carbon Dioxide  25  26  (22-30)  mmol/L


 


BUN  108 H*  98 H  (7-17)  mg/dL


 


Creatinine  2.35 H  2.44 H  (0.52-1.04)  mg/dL


 


Glucose  144 H  107 H  (74-99)  mg/dL


 


Calcium  9.0  8.6  (8.4-10.2)  mg/dL








 Adrenal panel











  10/01/18 10/02/18 Range/Units





  12:17 08:20 


 


Sodium  132 L  139  (137-145)  mmol/L


 


Potassium  5.2 H  3.5  (3.5-5.1)  mmol/L


 


Chloride  95 L  100  ()  mmol/L


 


Carbon Dioxide  25  26  (22-30)  mmol/L


 


BUN  108 H*  98 H  (7-17)  mg/dL


 


Creatinine  2.35 H  2.44 H  (0.52-1.04)  mg/dL


 


Glucose  144 H  107 H  (74-99)  mg/dL


 


Calcium  9.0  8.6  (8.4-10.2)  mg/dL


 


Total Bilirubin  0.9  0.5  (0.2-1.3)  mg/dL


 


AST  42 H  18  (14-36)  U/L


 


ALT  23  18  (9-52)  U/L


 


Alkaline Phosphatase  70  77  ()  U/L


 


Total Protein  8.3 H  7.2  (6.3-8.2)  g/dL


 


Albumin  3.6  3.1 L  (3.5-5.0)  g/dL














Assessment and Plan


Assessment: 


1.  Right 4th and 5th toe dry gangrene


 2.  Severe peripheral arterial disease with h/o multiple right lower extremity 

bypass surgeries.


 3.  Stage 1 and II sacral decubitus ulcers.


 4.  DM


 5.  Acute on chronic renal failure





Plan: 


Local wound care to the sacral decubitus ulcers.


 At this time there is little to be done to the toes and her peripheral 

vascular disease.  If her kidney function improves then may benefit from 

angiogram to further delineate blood flow.  I would recommend continued 

monitoring of the toes and if become purulent then will need distal amputation.

## 2018-10-02 NOTE — P.CON
Consult Note





- .


Consult date: 10/02/18


Assessment/Plan:: 





This is an 83-year-old  female patient known to ID service as she was 

seen in the past for right foot ulcer as well as sepsis from urinary tract 

infection in the past.  Patient currently resides at [facility] 

and is wheelchair bound.  She is known to have severe peripheral artery disease 

status post to right lower extremity bypasses by Dr. Tsai, history of 

critical limb ischemia and nonhealing ulcer of right.  Patient was transferred 

from the nursing home to [facility] emergency center due to 

concerns of wounds to the fourth and fifth right toes.  Venous Doppler was 

negative for DVT.  Patient is hard of hearing and is unable to provide any 

history by herself history is obtained by the previous documentation and ER 

documentation.  Temperature max was 101, tachycardia 101, blood pressure 120/60 

saturating well on room air.  Lab obtained suggest leukocytosis of 22.4, 

hemoglobin 10.3 which is close to patient's baseline.  INR obtained suggest 3.1 

and patient is on chronic Coumadin for atrial fibrillation, BNP suggestive 

sodium 132, potassium 5.2, creatinine 2.35 and GFR decreased to 21 from 38 

urinalysis obtained as suggest some leukocytosis 13, with positive nitrates and 

leukocyte esterase.  In the emergency center, patient received vancomycin and 

cefepime.  Kayexalate was given for hyperkalemia.  Patient was [facility] 

[facility] floor.  She has been seen by Dr. Napier and he does not plan for any 

intervention at this time. If patient's kidney function improve she may benefit 

from angiogram. Patient does have pulses palpable in her lower extremities.  

Perez catheter is to be exchanged today.  She has stage II pressure ulcers of 

the buttocks that were present on admission.  She has been afebrile since 

admission.  White count is down to 15.1.  BUN 98 creatinine 2.44.  She is 

currently on cefepime and vancomycin.  She apparently presented with abdominal 

pain that has resolved.  Hip x-ray showed no fracture or dislocation.  CT of 

the abdomen and pelvis shows atrophic right kidney.  Cardiomegaly.  Coronary 

artery disease.  Hiatal hernia.  Renal ultrasound shows atrophic changes to the 

right kidney as on prior exam.  Cystic focus lower pole left kidney is similar 

to prior exam.  Most recent previous urine culture from December 2017 was 

positive for ESBL E. coli and Enterococcus faecalis.  Please see the consult 

note is dictated by nurse practitioner Mrs. Candy Kumar.


83-year-old woman presents to Hospital from the extended care facility because 

of changes to her right foot at the distal aspect of the toes as well as 

significant swelling and erythema to the right leg.  She's been evaluated by 

vascular surgery.  Is on evidence of fever and there is concerns of a 

cellulitis to the area.  Zosyn and vancomycin have been started because of her 

prior history of ESBL E. coli and enterococcal infection.  With evidence of the 

dry gangrene, just a dry dressing will be utilized to the toes.  She over does 

have more active ulcerations to her buttocks for which medical honey will be 

applied.  She is evidence of acute renal failure with a creatinine from 2017 

and 1.02.  At presentation there was a severe leukocytosis of 22.4 improving to 

15.1 with concerns for urinary infection as well as the dry gangrenous changes 

to her right leg and a cellulitis.  Cultures are in process will further help 

direct therapy.  I agree with evaluation, assessment and plan as dictated by 

nurse practitioner Mrs. Candy Kumar.

## 2018-10-02 NOTE — P.PN
Subjective


Progress Note Date: 10/02/18





2 years old  female patient of Dr. Pardo recites them medical large 

with past medical history of atrial fibrillation on Coumadin, diabetes mellitus 

with neuropathy, hyperlipidemia, hypertension, thyroid disorder, history of 

peripheral artery disease status post to right lower extremity bypasses by Dr. Tsai, history of critical limb ischemia and nonhealing ulcer of right 11th 

in 2016 history of UTIs, urinary incontinence history of MRSA in 2016 presents 

with swelling and pain in the right lower extremity.  Patient comes to the ER 

with significant swelling on involving the right lower extremity with erythema 

for the past few weeks.  Arterial study was done as outpatient by Dr. Pardo 

that suggested worsening of the peripheral artery disease.  Venous Doppler was 

done in the ER to rule out DVT that was negative for any occlusion.  Patient 

continues to complain of significant pain in the right lower extremity at the 

level of groin.  Patient is wheelchair bound and is a long-term patient at 

discharge.  Patient is hard of hearing and is unable to provide any history by 

herself history is obtained by the previous documentation and ER documentation.

  On evaluation in the ER, where a suggested temp of 101, tachycardia 101, 

blood pressure 120/60 saturating well on room air.  Lab obtained suggest 

leukocytosis of 22.4, hemoglobin 10.3 which is close to patient's baseline.  

INR obtained suggest 3.1, BNP suggestive sodium 132, potassium 5.2, V1 108 

which is increased from the baseline, creatinine 2.35 and GFR decreased to 21 

from 38 urinalysis obtained as suggest some leukocytosis 13, with positive 

nitrates and leukocyte esterase. 








10/2: Consults and place with Dr. Napier and Dr. Ross.  Dr. Napier does not 

plan for any intervention at this time. If patient's kidney function improve 

she may benefit from angiogram. Patient does have pulses palpable in her lower 

extremities.  Perez catheter is to be exchanged today.  She has stage II 

pressure ulcers of the buttocks that were present on admission.  She has been 

afebrile since admission.  White count is down to 15.1.  BUN 98 creatinine 

2.44.  She is currently on cefepime and vancomycin that have been changed over 

to Zosyn and vancomycin.  Patient remains pleasantly confused and also suffers 

from difficulty hearing patient communication difficult.  Abdominal pain seems 

to have resolved.  Hip x-ray showed no fracture or dislocation.  CT of the 

abdomen and pelvis shows atrophic right kidney.  Cardiomegaly.  Coronary artery 

disease.  Hiatal hernia.  Renal ultrasound shows atrophic changes to the right 

kidney as on prior exam.  Cystic focus lower pole left kidney is similar to 

prior exam.











Objective





- Vital Signs


Vital signs: 


 Vital Signs











Temp  97.7 F   10/02/18 06:13


 


Pulse  82   10/02/18 06:13


 


Resp  16   10/02/18 06:13


 


BP  99/52   10/02/18 06:13


 


Pulse Ox  99   10/02/18 06:13








 Intake & Output











 10/01/18 10/02/18 10/02/18





 18:59 06:59 18:59


 


Intake Total 500 20 240


 


Output Total  1501 


 


Balance 500 -1481 240


 


Weight 90.718 kg  


 


Intake:   


 


  Amount of Fluid Infused ( 500  





  ml)   


 


  Oral  20 240


 


Output:   


 


  Urine  1500 


 


  Stool  1 


 


Other:   


 


  Voiding Method  Indwelling Catheter Indwelling Catheter


 


  # Voids  1 


 


  # Bowel Movements  1 














- Exam





General appearance: Unable to provide any history, cooperative, moderate acute 

distress, obese





- EENT


Eyes: anicteric sclerae, PERRLA, normal appearance


ENT: Hard of hearing


- Neck


Neck: no lymphadenopathy, normal ROM, no other, no rigidity, no stridor, no 

thyromegaly





- Respiratory


Respiratory: bilateral: CTA, negative: diminished, dullness, rales, rhonchi





- Cardiovascular


Rhythm: regular


Heart sounds: normal: S1, S2


Abnormal Heart Sounds: no systolic murmur, no diastolic murmur, no rub, no S3 

Gallop, no S4 Gallop, no click, no other





- Gastrointestinal


General gastrointestinal: normal bowel sounds, soft





- Integumentary


Integumentary: no rash fourth and fifth right lower extremity digits with the 

necrotic tip with serous drainage and bad odor.  With significant lower 

extremity edema and redness involving the medial side of the thigh and shin 





- Neurologic


Neurologic: CNII-XII intact





- Musculoskeletal


Musculoskeletal: Gait could not be assessed due to generalized weakness, 

strength equal bilaterally decreased in the lower extremity 3+/5





- Psychiatric


Psychiatric: A&O x's3, appropriate affect





- Labs


CBC & Chem 7: 


 10/02/18 08:20





 10/02/18 08:20


Labs: 


 Abnormal Lab Results - Last 24 Hours (Table)











  10/01/18 10/01/18 10/01/18 Range/Units





  12:17 12:17 12:17 


 


WBC    22.4 H  (3.8-10.6)  k/uL


 


RBC    3.60 L  (3.80-5.40)  m/uL


 


Hgb    10.3 L  (11.4-16.0)  gm/dL


 


Hct    30.6 L  (34.0-46.0)  %


 


Neutrophils #    18.8 H  (1.3-7.7)  k/uL


 


Monocytes #    1.1 H  (0-1.0)  k/uL


 


ESR     (0-20)  mm/hr


 


PT     (9.0-12.0)  sec


 


INR     (<1.2)  


 


APTT     (22.0-30.0)  sec


 


Sodium   132 L   (137-145)  mmol/L


 


Potassium   5.2 H   (3.5-5.1)  mmol/L


 


Chloride   95 L   ()  mmol/L


 


BUN   108 H*   (7-17)  mg/dL


 


Creatinine   2.35 H   (0.52-1.04)  mg/dL


 


Glucose   144 H   (74-99)  mg/dL


 


POC Glucose (mg/dL)     (75-99)  mg/dL


 


AST   42 H   (14-36)  U/L


 


C-Reactive Protein     (<10.0)  mg/L


 


Total Protein   8.3 H   (6.3-8.2)  g/dL


 


Albumin     (3.5-5.0)  g/dL


 


Urine Appearance  Cloudy H    (Clear)  


 


Urine Blood  Trace H    (Negative)  


 


Urine Nitrite  Positive H    (Negative)  


 


Ur Leukocyte Esterase  Large H    (Negative)  


 


Urine WBC  13 H    (0-5)  /hpf


 


Amorphous Sediment  Few H    (None)  /hpf


 


Urine Bacteria  Occasional H    (None)  /hpf


 


Urine Mucus  Rare H    (None)  /hpf














  10/01/18 10/01/18 10/01/18 Range/Units





  12:17 15:27 15:27 


 


WBC     (3.8-10.6)  k/uL


 


RBC     (3.80-5.40)  m/uL


 


Hgb     (11.4-16.0)  gm/dL


 


Hct     (34.0-46.0)  %


 


Neutrophils #     (1.3-7.7)  k/uL


 


Monocytes #     (0-1.0)  k/uL


 


ESR   110 H   (0-20)  mm/hr


 


PT  27.4 H    (9.0-12.0)  sec


 


INR  3.1 H    (<1.2)  


 


APTT  39.8 H    (22.0-30.0)  sec


 


Sodium     (137-145)  mmol/L


 


Potassium     (3.5-5.1)  mmol/L


 


Chloride     ()  mmol/L


 


BUN     (7-17)  mg/dL


 


Creatinine     (0.52-1.04)  mg/dL


 


Glucose     (74-99)  mg/dL


 


POC Glucose (mg/dL)     (75-99)  mg/dL


 


AST     (14-36)  U/L


 


C-Reactive Protein    83.6 H  (<10.0)  mg/L


 


Total Protein     (6.3-8.2)  g/dL


 


Albumin     (3.5-5.0)  g/dL


 


Urine Appearance     (Clear)  


 


Urine Blood     (Negative)  


 


Urine Nitrite     (Negative)  


 


Ur Leukocyte Esterase     (Negative)  


 


Urine WBC     (0-5)  /hpf


 


Amorphous Sediment     (None)  /hpf


 


Urine Bacteria     (None)  /hpf


 


Urine Mucus     (None)  /hpf














  10/01/18 10/01/18 10/02/18 Range/Units





  17:16 21:02 07:14 


 


WBC     (3.8-10.6)  k/uL


 


RBC     (3.80-5.40)  m/uL


 


Hgb     (11.4-16.0)  gm/dL


 


Hct     (34.0-46.0)  %


 


Neutrophils #     (1.3-7.7)  k/uL


 


Monocytes #     (0-1.0)  k/uL


 


ESR     (0-20)  mm/hr


 


PT     (9.0-12.0)  sec


 


INR     (<1.2)  


 


APTT     (22.0-30.0)  sec


 


Sodium     (137-145)  mmol/L


 


Potassium     (3.5-5.1)  mmol/L


 


Chloride     ()  mmol/L


 


BUN     (7-17)  mg/dL


 


Creatinine     (0.52-1.04)  mg/dL


 


Glucose     (74-99)  mg/dL


 


POC Glucose (mg/dL)  162 H  288 H  102 H  (75-99)  mg/dL


 


AST     (14-36)  U/L


 


C-Reactive Protein     (<10.0)  mg/L


 


Total Protein     (6.3-8.2)  g/dL


 


Albumin     (3.5-5.0)  g/dL


 


Urine Appearance     (Clear)  


 


Urine Blood     (Negative)  


 


Urine Nitrite     (Negative)  


 


Ur Leukocyte Esterase     (Negative)  


 


Urine WBC     (0-5)  /hpf


 


Amorphous Sediment     (None)  /hpf


 


Urine Bacteria     (None)  /hpf


 


Urine Mucus     (None)  /hpf














  10/02/18 10/02/18 Range/Units





  08:20 08:20 


 


WBC  15.1 H   (3.8-10.6)  k/uL


 


RBC  3.53 L   (3.80-5.40)  m/uL


 


Hgb  9.9 L   (11.4-16.0)  gm/dL


 


Hct  31.0 L   (34.0-46.0)  %


 


Neutrophils #  12.7 H   (1.3-7.7)  k/uL


 


Monocytes #    (0-1.0)  k/uL


 


ESR    (0-20)  mm/hr


 


PT    (9.0-12.0)  sec


 


INR    (<1.2)  


 


APTT    (22.0-30.0)  sec


 


Sodium    (137-145)  mmol/L


 


Potassium    (3.5-5.1)  mmol/L


 


Chloride    ()  mmol/L


 


BUN   98 H  (7-17)  mg/dL


 


Creatinine   2.44 H  (0.52-1.04)  mg/dL


 


Glucose   107 H  (74-99)  mg/dL


 


POC Glucose (mg/dL)    (75-99)  mg/dL


 


AST    (14-36)  U/L


 


C-Reactive Protein    (<10.0)  mg/L


 


Total Protein    (6.3-8.2)  g/dL


 


Albumin   3.1 L  (3.5-5.0)  g/dL


 


Urine Appearance    (Clear)  


 


Urine Blood    (Negative)  


 


Urine Nitrite    (Negative)  


 


Ur Leukocyte Esterase    (Negative)  


 


Urine WBC    (0-5)  /hpf


 


Amorphous Sediment    (None)  /hpf


 


Urine Bacteria    (None)  /hpf


 


Urine Mucus    (None)  /hpf














Assessment and Plan


Plan: 





#1 sepsis secondary to urinary tract infection.  Perez catheter to be changed.  

IV antibiotics have been changed to Zosyn and vancomycin by Dr. Ross.  

Continue to monitor closely.





#2.  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  History of 

peripheral artery disease status post bypass and stenting with Dr. Tsai and 

Dr. Wood.  Consult with Dr. Napier.  No plan for any intervention.  If 

patient kidney function improve she may benefit from angiogram.





#3 paroxysmal atrial fibrillation on Coumadin.  INR 3.1 hold Coumadin today.  

Repeat INR tomorrow





#4 hyperkalemia secondary to acute kidney injury on CKD.  Kayexalate ordered.  

EKG ordered repeat BMP tomorrow hold lisinopril and potassium tablet





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  Status post 

2 L IV fluid continue normal saline at 75 mL per hour.  Hold Lasix, 

hydrochlorothiazide and amiloride.  Hold lisinopril.  Nephrology consult CT 

abdomen and renal ultrasound as above.





#6 UTI urine culture ordered.





#7 diabetes2 with diabetic neuropathy continue Lantus 30 units daily at bedtime 

with 10 units lispro with sliding scale





#8 hypertension controlled on metoprolol  hold Lasix hold hydrochlorothiazide 

and amiloride.  Hold lisinopril for hyper hyperkalemia





#10 DVT prophylaxis INR therapeutic hold Coumadin INR tomorrow





#11 GI prophylaxis Pepcid 20 mg by mouth daily





#12 CODE STATUS full code





#13 right lower quadrant abdominal pain, resolved.  No acute abnormality on CAT 

scan.





Discharge plan: Return to [facility].





Impression and plan of care have been directed as dictated by the signing 

physician.  Candy Kumar nurse practitioner acting as scribe for signing 

physician.

## 2018-10-02 NOTE — CONS
CONSULTATION



REASON FOR CONSULT:

Renal failure.



HISTORY OF PRESENT ILLNESS:

Patient is an 83-year-old female who was admitted to the hospital yesterday with

complaints of right lower extremity swelling which had been going on for about a few

weeks.  Patient resides at [facility].  Her serum creatinine was 2.4 mg/dL today.

Yesterday it was 2.35 and review of previous labs shows a serum creatinine of about 1.2

to 1.4 mg/dL in March and June of 2018.  On this admission, blood pressure is noted to

be on the lower side with systolic in the 99 range.  The patient denies use of any non-

steroidal anti-inflammatory agents prior to admission.  Review of her med list shows

she was on ACE inhibitors, which are currently on hold.  Patient states she has been

voiding well.  She did have an ultrasound which did not show any evidence of

hydronephrosis.  Her right kidney is atrophic at 5.2 cm.



PAST MEDICAL HISTORY:

1. Hypertension.

2. Chronic kidney disease.  It appears to NKF stage III with baseline creatinine 1.2

    to 1.4 mg/dL.

3. History of atrial fibrillation.

4. Type 2 diabetes with neuropathy.

5. Hyperlipidemia.

6. UTIs.

7. History of cervical cancer.

8. Peripheral vascular disease.



PAST SURGICAL HISTORY:

1. Right leg vascular surgery for bypass.

2. Bilateral cataract surgery.

3. Tonsillectomy.

4. Hysterectomy.



SOCIAL HISTORY:

Negative for smoking, drug abuse or alcohol abuse.



MEDICATIONS PRIOR TO ADMISSION:

1. Synthroid.

2. Aspirin.

3. Pravachol.

4. Coumadin.

5. Moduretic.

6. Zestril.

7. Norvasc.

8. Lopressor.

9. Januvia.

10.Insulin.

11.Potassium.

12.Coumadin.



ALLERGIES:

NONE.



REVIEW OF SYSTEMS:

As per HPI.  Other systems negative.



PHYSICAL EXAMINATION:

Patient is comfortable, awake, alert, oriented x3.  She is not in any acute distress.

Blood pressure is 117/52, heart rate 88 per minute.  She is afebrile.

EXAMINATION OF THE HEART: S1, S2.

EXAMINATION OF LUNGS: Bilateral breath sounds are heard. Decreased breath sounds at the

bases.

ABDOMEN:  Soft, non-tender.

Examination of lower extremities shows chronic skin changes. Bilateral lower extremity

edema is noted.

CNS exam is grossly intact.



LABS:

Sodium 139, potassium 3.5, chloride 100, BUN 98, serum creatinine 2.4, hemoglobin 9.9

g/dL.



ASSESSMENT:

1. Acute kidney injury, most likely secondary to hypoperfusion, currently non-

    oliguric.  There is no evidence of obstruction noted on ultrasound; however,

    patient has an atrophic right kidney. Most of her kidney function is coming from

    the left kidney.  She is not on any nephrotoxic medications.  I will decrease the

    Lopressor and hold off on the Norvasc, given the low blood pressure.  We will

    monitor her urine output closely.  Patient is maintained on vancomycin, which I

    would avoid unless absolutely necessary, given her renal failure with acute kidney

    injury.  Continue with the IV fluids for now.

2. Chronic kidney disease, NKF stage III, most likely secondary to nephrosclerosis.

    No evidence of proteinuria on current UA.  Baseline creatinine about 1.2 to 1.4

    mg/dL.

3. Anemia.  Rule out iron deficiency.  No active bleeding noted at this time.

4. Hypertension. Currently blood pressure is low.

5. Possible urinary tract infection. Culture has not grown anything yet.

6. 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

    surgery in the lower extremities, being followed by Vascular Surgery.

7. Paroxysmal atrial fibrillation, maintained on Coumadin.

8. Hyperkalemia associated with acute kidney injury.  ACE inhibitors are on hold.



PLAN:

Hold Norvasc.  Continue to hold off on ACE inhibitors.  Decrease Lopressor.  Continue

IV fluids.  Repeat labs in a.m. and check iron studies.  Check phosphorus as well with

labs in a.m.  I would hold off on the vancomycin unless absolutely necessary.



Thank you for this consultation.  We will continue to follow the patient with you

during her hospitalization.





MMODL / IJN: [id number] / Job#: [id number]

## 2018-10-02 NOTE — P.CONS
History of Present Illness





- Reason for Consult


Consult date: 10/02/18


Cellulitis right lower extremity





- History of Present Illness





This is an 83-year-old  female patient known to ID service as she was 

seen in the past for right foot ulcer as well as sepsis from urinary tract 

infection in the past.  Patient currently resides at [facility] 

and is wheelchair bound.  She is known to have severe peripheral artery disease 

status post to right lower extremity bypasses by Dr. Tsai, history of 

critical limb ischemia and nonhealing ulcer of right.  Patient was transferred 

from the nursing home to [facility] emergency center due to 

concerns of wounds to the fourth and fifth right toes.  Venous Doppler was 

negative for DVT.  Patient is hard of hearing and is unable to provide any 

history by herself history is obtained by the previous documentation and ER 

documentation.  Temperature max was 101, tachycardia 101, blood pressure 120/60 

saturating well on room air.  Lab obtained suggest leukocytosis of 22.4, 

hemoglobin 10.3 which is close to patient's baseline.  INR obtained suggest 3.1 

and patient is on chronic Coumadin for atrial fibrillation, BNP suggestive 

sodium 132, potassium 5.2, creatinine 2.35 and GFR decreased to 21 from 38 

urinalysis obtained as suggest some leukocytosis 13, with positive nitrates and 

leukocyte esterase.  In the emergency center, patient received vancomycin and 

cefepime.  Kayexalate was given for hyperkalemia.  [facility].  She has been seen by Dr. Napier and he does not plan for any 

intervention at this time. If patient's kidney function improve she may benefit 

from angiogram. Patient does have pulses palpable in her lower extremities.  

Perez catheter is to be exchanged today.  She has stage II pressure ulcers of 

the buttocks that were present on admission.  She has been afebrile since 

admission.  White count is down to 15.1.  BUN 98 creatinine 2.44.  She is 

currently on cefepime and vancomycin.  She apparently presented with abdominal 

pain that has resolved.  Hip x-ray showed no fracture or dislocation.  CT of 

the abdomen and pelvis shows atrophic right kidney.  Cardiomegaly.  Coronary 

artery disease.  Hiatal hernia.  Renal ultrasound shows atrophic changes to the 

right kidney as on prior exam.  Cystic focus lower pole left kidney is similar 

to prior exam.  Most recent previous urine culture from 2017 was 

positive for ESBL E. coli and Enterococcus faecalis.








Review of Systems


ROS unobtainable: due to mental status


All systems: negative


Constitutional: Denies chills, Denies fever


Eyes: denies blurred vision, denies pain


Ears, nose, mouth and throat: Denies headache, Denies sore throat


Cardiovascular: Denies chest pain, Denies shortness of breath


Respiratory: Denies cough


Gastrointestinal: Denies abdominal pain, Denies diarrhea, Denies nausea, Denies 

vomiting


Genitourinary: Denies dysuria, Denies hematuria


Musculoskeletal: Denies myalgias


Integumentary: Denies pruritus, Denies rash


Neurological: Denies numbness, Denies weakness


Psychiatric: Denies anxiety, Denies depression


Endocrine: Denies fatigue, Denies weight change





Past Medical History


Past Medical History: Atrial Fibrillation, Diabetes Mellitus, Hyperlipidemia, 

Hypertension, Thyroid Disorder


Additional Past Medical History / Comment(s): peripheral neuropathy, UTIs, 

urinary incontinence, OA bilateral hands and back, PVD, cervical cancer


History of Any Multi-Drug Resistant Organisms: MRSA


Year Discovered:: 2016


MDRO Source:: RIGHT FOOT


Past Surgical History: Hysterectomy, Tonsillectomy


Additional Past Surgical History / Comment(s): R leg bypass surgery, bilateral 

cataract removal


Past Anesthesia/Blood Transfusion Reactions: No Reported Reaction


Past Psychological History: No Psychological Hx Reported


Smoking Status: Never smoker


Past Alcohol Use History: None Reported


Additional Past Alcohol Use History / Comment(s): Patient resides at [facility] and is wheelchair bound.


Past Drug Use History: None Reported





- Past Family History


  ** Mother


Family Medical History: CVA/TIA


Additional Family Medical History / Comment(s): Mother  in her 70's.





  ** Father


Family Medical History: Hypertension


Additional Family Medical History / Comment(s): Father  in his 70's.





Medications and Allergies


 Home Medications











 Medication  Instructions  Recorded  Confirmed  Type


 


Aspirin 81 mg PO HS 02/08/16 10/01/18 History


 


Levothyroxine Sodium [Synthroid] 50 mcg PO QAM 02/08/16 10/01/18 History


 


Pravastatin Sodium [Pravachol] 80 mg PO HS 02/08/16 10/01/18 History


 


Warfarin [Coumadin] 3 mg PO SUMOTUTHSA 02/08/16 10/01/18 History


 


aMILoride-HCTZ 5-50 mg [Moduretic 1 tab PO DAILY 02/08/16 10/01/18 History





5-50]    


 


amLODIPine [Norvasc] 5 mg PO DAILY 02/08/16 10/01/18 History


 


Lisinopril [Zestril] 10 mg PO BID 06/14/16 10/01/18 History


 


Metoprolol Tartrate [Lopressor] 100 mg PO BID #1 tab 06/21/16 10/01/18 Rx


 


sitaGLIPtin [Januvia] 50 mg PO HS #1 tab 06/21/16 10/01/18 Rx


 


Insulin Glargine [Lantus] 30 unit SQ HS 07/06/17 10/01/18 History


 


INSULIN LISPRO (humaLOG) [humaLOG] 10 units SQ AC-TID@,,16 11/13/17 10/01/

18 History


 


Sennosides-Docusate Sodium 1 tab PO DAILY 11/13/17 10/01/18 History





[Senokot-S]    


 


Acetaminophen Tab [Tylenol Tab] 650 mg PO Q6H PRN 10/01/18 10/01/18 History


 


Furosemide [Lasix] 60 mg PO BID@,16 10/01/18 10/01/18 History


 


Multivitamins, Thera [Multivitamin 1 tab PO DAILY 10/01/18 10/01/18 History





(formulary)]    


 


Potassium Chloride ER [K-Dur 10] 10 meq PO DAILY 10/01/18 10/01/18 History


 


Sodium Chloride 5% Ophth Soln 1 drop BOTH EYES BID 10/01/18 10/01/18 History





[Vahid 128]    


 


Warfarin [Coumadin] 6 mg PO WEFR 10/01/18 10/01/18 History











 Allergies











Allergy/AdvReac Type Severity Reaction Status Date / Time


 


No Known Allergies Allergy   Verified 10/01/18 12:45














Physical Exam


Vitals: 


 Vital Signs











  Temp Pulse Pulse Resp BP BP Pulse Ox


 


 10/02/18 06:13  97.7 F   82  16   99/52  99


 


 10/01/18 23:00  97.8 F   67  16   99/53  98


 


 10/01/18 16:26  97.9 F   108 H  20   114/54  97


 


 10/01/18 15:31  101 F H  107 H   18  116/59   100


 


 10/01/18 13:28   89   18  129/59   99


 


 10/01/18 12:02  100.0 F H  94   18  120/60   98








 Intake and Output











 10/01/18 10/02/18 10/02/18





 22:59 06:59 14:59


 


Intake Total 520  240


 


Output Total  1501 


 


Balance 520 -1501 240


 


Intake:   


 


  Amount of Fluid Infused ( 500  





  ml)   


 


  Oral 20  240


 


Output:   


 


  Urine  1500 


 


  Stool  1 


 


Other:   


 


  Voiding Method Indwelling Catheter  Indwelling Catheter


 


  # Voids  1 


 


  # Bowel Movements  1 

















Gen: This is an 83-year-old obese  female.  She is resting in bed 

appears to be comfortable and in no acute distress.


HEENT: Head is atraumatic, normocephalic. Pupils equal, round. Sclerae is 

anicteric. 


NECK: Supple. No JVD. No lymphadenopathy. No thyromegaly. 


LUNGS: Clear to auscultation. No wheezes or rhonchi.  No intercostal 

retractions.


HEART: Regular rate and rhythm. No murmur. 


ABDOMEN: Soft. Bowel sounds are present. No masses.  No tenderness.


EXTREMITIES: No pedal edema.  No calf tenderness.  Necrotic tissue at the ends 

of toes 4 and 5 on the right foot.  Foot is warm to the touch.  No drainage 

noted.  Minimal erythema localized only.


NEUROLOGICAL: Patient is awake, alert and oriented to person and place.  

Generalized weakness.  Her lower extremities.. 








Results


Results: 





 Laboratory Results











WBC  15.1 k/uL (3.8-10.6)  H  10/02/18  08:20    


 


RBC  3.53 m/uL (3.80-5.40)  L  10/02/18  08:20    


 


Hgb  9.9 gm/dL (11.4-16.0)  L  10/02/18  08:20    


 


Hct  31.0 % (34.0-46.0)  L  10/02/18  08:20    


 


MCV  87.8 fL (80.0-100.0)   10/02/18  08:20    


 


MCH  28.0 pg (25.0-35.0)   10/02/18  08:20    


 


MCHC  31.9 g/dL (31.0-37.0)   10/02/18  08:20    


 


RDW  13.2 % (11.5-15.5)   10/02/18  08:20    


 


Plt Count  242 k/uL (150-450)   10/02/18  08:20    


 


Neutrophils %  84 %  10/02/18  08:20    


 


Lymphocytes %  8 %  10/02/18  08:20    


 


Monocytes %  4 %  10/02/18  08:20    


 


Eosinophils %  1 %  10/02/18  08:20    


 


Basophils %  1 %  10/02/18  08:20    


 


Neutrophils #  12.7 k/uL (1.3-7.7)  H  10/02/18  08:20    


 


Lymphocytes #  1.3 k/uL (1.0-4.8)   10/02/18  08:20    


 


Monocytes #  0.6 k/uL (0-1.0)   10/02/18  08:20    


 


Eosinophils #  0.2 k/uL (0-0.7)   10/02/18  08:20    


 


Basophils #  0.1 k/uL (0-0.2)   10/02/18  08:20    


 


ESR  110 mm/hr (0-20)  H  10/01/18  15:27    


 


PT  27.4 sec (9.0-12.0)  H  10/01/18  12:17    


 


INR  3.1  (<1.2)  H  10/01/18  12:17    


 


APTT  39.8 sec (22.0-30.0)  H  10/01/18  12:17    


 


Sodium  139 mmol/L (137-145)   10/02/18  08:20    


 


Potassium  3.5 mmol/L (3.5-5.1)   10/02/18  08:20    


 


Chloride  100 mmol/L ()   10/02/18  08:20    


 


Carbon Dioxide  26 mmol/L (22-30)   10/02/18  08:20    


 


Anion Gap  13 mmol/L  10/02/18  08:20    


 


BUN  98 mg/dL (7-17)  H  10/02/18  08:20    


 


Creatinine  2.44 mg/dL (0.52-1.04)  H  10/02/18  08:20    


 


Est GFR (CKD-EPI)AfAm  21  (>60 ml/min/1.73 sqM)   10/02/18  08:20    


 


Est GFR (CKD-EPI)NonAf  18  (>60 ml/min/1.73 sqM)   10/02/18  08:20    


 


Glucose  107 mg/dL (74-99)  H  10/02/18  08:20    


 


POC Glucose (mg/dL)  110 mg/dL (75-99)  H  10/02/18  12:14    


 


POC Glu Operater ID  Shilpa Obando   10/02/18  12:14    


 


Plasma Lactic Acid Amari  1.4 mmol/L (0.7-2.0)   10/01/18  12:17    


 


Calcium  8.6 mg/dL (8.4-10.2)   10/02/18  08:20    


 


Total Bilirubin  0.5 mg/dL (0.2-1.3)   10/02/18  08:20    


 


AST  18 U/L (14-36)   10/02/18  08:20    


 


ALT  18 U/L (9-52)   10/02/18  08:20    


 


Alkaline Phosphatase  77 U/L ()   10/02/18  08:20    


 


C-Reactive Protein  83.6 mg/L (<10.0)  H  10/01/18  15:27    


 


Total Protein  7.2 g/dL (6.3-8.2)   10/02/18  08:20    


 


Albumin  3.1 g/dL (3.5-5.0)  L  10/02/18  08:20    


 


Urine Color  Light Yellow   10/01/18  12:17    


 


Urine Appearance  Cloudy  (Clear)  H  10/01/18  12:17    


 


Urine pH  7.0  (5.0-8.0)   10/01/18  12:17    


 


Ur Specific Gravity  1.008  (1.001-1.035)   10/01/18  12:17    


 


Urine Protein  Negative  (Negative)   10/01/18  12:17    


 


Urine Glucose (UA)  Negative  (Negative)   10/01/18  12:17    


 


Urine Ketones  Negative  (Negative)   10/01/18  12:17    


 


Urine Blood  Trace  (Negative)  H  10/01/18  12:17    


 


Urine Nitrite  Positive  (Negative)  H  10/01/18  12:17    


 


Urine Bilirubin  Negative  (Negative)   10/01/18  12:17    


 


Urine Urobilinogen  <2.0 mg/dL (<2.0)   10/01/18  12:17    


 


Ur Leukocyte Esterase  Large  (Negative)  H  10/01/18  12:17    


 


Urine RBC  3 /hpf (0-5)   10/01/18  12:17    


 


Urine WBC  13 /hpf (0-5)  H  10/01/18  12:17    


 


Amorphous Sediment  Few /hpf (None)  H  10/01/18  12:17    


 


Urine Bacteria  Occasional /hpf (None)  H  10/01/18  12:17    


 


Urine Mucus  Rare /hpf (None)  H  10/01/18  12:17    











CBC & Chem 7: 


 10/02/18 08:20





 10/02/18 08:20


Labs: 


 Abnormal Lab Results - Last 24 Hours (Table)











  10/01/18 10/01/18 10/01/18 Range/Units





  12:17 12:17 12:17 


 


WBC    22.4 H  (3.8-10.6)  k/uL


 


RBC    3.60 L  (3.80-5.40)  m/uL


 


Hgb    10.3 L  (11.4-16.0)  gm/dL


 


Hct    30.6 L  (34.0-46.0)  %


 


Neutrophils #    18.8 H  (1.3-7.7)  k/uL


 


Monocytes #    1.1 H  (0-1.0)  k/uL


 


ESR     (0-20)  mm/hr


 


PT     (9.0-12.0)  sec


 


INR     (<1.2)  


 


APTT     (22.0-30.0)  sec


 


Sodium   132 L   (137-145)  mmol/L


 


Potassium   5.2 H   (3.5-5.1)  mmol/L


 


Chloride   95 L   ()  mmol/L


 


BUN   108 H*   (7-17)  mg/dL


 


Creatinine   2.35 H   (0.52-1.04)  mg/dL


 


Glucose   144 H   (74-99)  mg/dL


 


POC Glucose (mg/dL)     (75-99)  mg/dL


 


AST   42 H   (14-36)  U/L


 


C-Reactive Protein     (<10.0)  mg/L


 


Total Protein   8.3 H   (6.3-8.2)  g/dL


 


Albumin     (3.5-5.0)  g/dL


 


Urine Appearance  Cloudy H    (Clear)  


 


Urine Blood  Trace H    (Negative)  


 


Urine Nitrite  Positive H    (Negative)  


 


Ur Leukocyte Esterase  Large H    (Negative)  


 


Urine WBC  13 H    (0-5)  /hpf


 


Amorphous Sediment  Few H    (None)  /hpf


 


Urine Bacteria  Occasional H    (None)  /hpf


 


Urine Mucus  Rare H    (None)  /hpf














  10/01/18 10/01/18 10/01/18 Range/Units





  12:17 15:27 15:27 


 


WBC     (3.8-10.6)  k/uL


 


RBC     (3.80-5.40)  m/uL


 


Hgb     (11.4-16.0)  gm/dL


 


Hct     (34.0-46.0)  %


 


Neutrophils #     (1.3-7.7)  k/uL


 


Monocytes #     (0-1.0)  k/uL


 


ESR   110 H   (0-20)  mm/hr


 


PT  27.4 H    (9.0-12.0)  sec


 


INR  3.1 H    (<1.2)  


 


APTT  39.8 H    (22.0-30.0)  sec


 


Sodium     (137-145)  mmol/L


 


Potassium     (3.5-5.1)  mmol/L


 


Chloride     ()  mmol/L


 


BUN     (7-17)  mg/dL


 


Creatinine     (0.52-1.04)  mg/dL


 


Glucose     (74-99)  mg/dL


 


POC Glucose (mg/dL)     (75-99)  mg/dL


 


AST     (14-36)  U/L


 


C-Reactive Protein    83.6 H  (<10.0)  mg/L


 


Total Protein     (6.3-8.2)  g/dL


 


Albumin     (3.5-5.0)  g/dL


 


Urine Appearance     (Clear)  


 


Urine Blood     (Negative)  


 


Urine Nitrite     (Negative)  


 


Ur Leukocyte Esterase     (Negative)  


 


Urine WBC     (0-5)  /hpf


 


Amorphous Sediment     (None)  /hpf


 


Urine Bacteria     (None)  /hpf


 


Urine Mucus     (None)  /hpf














  10/01/18 10/01/18 10/02/18 Range/Units





  17:16 21:02 07:14 


 


WBC     (3.8-10.6)  k/uL


 


RBC     (3.80-5.40)  m/uL


 


Hgb     (11.4-16.0)  gm/dL


 


Hct     (34.0-46.0)  %


 


Neutrophils #     (1.3-7.7)  k/uL


 


Monocytes #     (0-1.0)  k/uL


 


ESR     (0-20)  mm/hr


 


PT     (9.0-12.0)  sec


 


INR     (<1.2)  


 


APTT     (22.0-30.0)  sec


 


Sodium     (137-145)  mmol/L


 


Potassium     (3.5-5.1)  mmol/L


 


Chloride     ()  mmol/L


 


BUN     (7-17)  mg/dL


 


Creatinine     (0.52-1.04)  mg/dL


 


Glucose     (74-99)  mg/dL


 


POC Glucose (mg/dL)  162 H  288 H  102 H  (75-99)  mg/dL


 


AST     (14-36)  U/L


 


C-Reactive Protein     (<10.0)  mg/L


 


Total Protein     (6.3-8.2)  g/dL


 


Albumin     (3.5-5.0)  g/dL


 


Urine Appearance     (Clear)  


 


Urine Blood     (Negative)  


 


Urine Nitrite     (Negative)  


 


Ur Leukocyte Esterase     (Negative)  


 


Urine WBC     (0-5)  /hpf


 


Amorphous Sediment     (None)  /hpf


 


Urine Bacteria     (None)  /hpf


 


Urine Mucus     (None)  /hpf














  10/02/18 10/02/18 Range/Units





  08:20 08:20 


 


WBC  15.1 H   (3.8-10.6)  k/uL


 


RBC  3.53 L   (3.80-5.40)  m/uL


 


Hgb  9.9 L   (11.4-16.0)  gm/dL


 


Hct  31.0 L   (34.0-46.0)  %


 


Neutrophils #  12.7 H   (1.3-7.7)  k/uL


 


Monocytes #    (0-1.0)  k/uL


 


ESR    (0-20)  mm/hr


 


PT    (9.0-12.0)  sec


 


INR    (<1.2)  


 


APTT    (22.0-30.0)  sec


 


Sodium    (137-145)  mmol/L


 


Potassium    (3.5-5.1)  mmol/L


 


Chloride    ()  mmol/L


 


BUN   98 H  (7-17)  mg/dL


 


Creatinine   2.44 H  (0.52-1.04)  mg/dL


 


Glucose   107 H  (74-99)  mg/dL


 


POC Glucose (mg/dL)    (75-99)  mg/dL


 


AST    (14-36)  U/L


 


C-Reactive Protein    (<10.0)  mg/L


 


Total Protein    (6.3-8.2)  g/dL


 


Albumin   3.1 L  (3.5-5.0)  g/dL


 


Urine Appearance    (Clear)  


 


Urine Blood    (Negative)  


 


Urine Nitrite    (Negative)  


 


Ur Leukocyte Esterase    (Negative)  


 


Urine WBC    (0-5)  /hpf


 


Amorphous Sediment    (None)  /hpf


 


Urine Bacteria    (None)  /hpf


 


Urine Mucus    (None)  /hpf














Assessment and Plan


Plan: 


This is an 83-year-old  female who presents to hospital with sepsis 

most likely secondary to urinary tract infection.  Perez catheter has been 

changed today.  She is currently on IV antibiotics in form of cefepime and 

vancomycin.  Antibiotics will be changed over to Zosyn and vancomycin.  Patient 

also has severe peripheral vascular disease with dry gangrene to the tips of 

fourth and fifth toes on the right.  Local wound care will be addressed.  

Continue local wound care to stage II decubitus to buttocks, present on 

admission.  Continue supportive care.  Further recommendations as patient 

versus.





The above dictated assessment and findings were discussed with Dr. Ross.  The 

impression and plan of care have been directed as dictated.  Candy Kumar nurse 

practitioner acting as scribe for Dr. Ross.

## 2018-10-03 LAB
ALBUMIN SERPL-MCNC: 3.1 G/DL (ref 3.5–5)
ALP SERPL-CCNC: 96 U/L (ref 38–126)
ALT SERPL-CCNC: 31 U/L (ref 9–52)
ANION GAP SERPL CALC-SCNC: 13 MMOL/L
AST SERPL-CCNC: 38 U/L (ref 14–36)
BUN SERPL-SCNC: 92 MG/DL (ref 7–17)
CALCIUM SPEC-MCNC: 8.6 MG/DL (ref 8.4–10.2)
CHLORIDE SERPL-SCNC: 102 MMOL/L (ref 98–107)
CO2 SERPL-SCNC: 25 MMOL/L (ref 22–30)
ERYTHROCYTE [DISTWIDTH] IN BLOOD BY AUTOMATED COUNT: 3.48 M/UL (ref 3.8–5.4)
ERYTHROCYTE [DISTWIDTH] IN BLOOD: 13.5 % (ref 11.5–15.5)
GLUCOSE BLD-MCNC: 108 MG/DL (ref 75–99)
GLUCOSE BLD-MCNC: 115 MG/DL (ref 75–99)
GLUCOSE BLD-MCNC: 164 MG/DL (ref 75–99)
GLUCOSE BLD-MCNC: 193 MG/DL (ref 75–99)
GLUCOSE SERPL-MCNC: 116 MG/DL (ref 74–99)
HCT VFR BLD AUTO: 30.4 % (ref 34–46)
HGB BLD-MCNC: 9.8 GM/DL (ref 11.4–16)
INR PPP: 2.5 (ref ?–1.2)
MCH RBC QN AUTO: 28.2 PG (ref 25–35)
MCHC RBC AUTO-ENTMCNC: 32.2 G/DL (ref 31–37)
MCV RBC AUTO: 87.4 FL (ref 80–100)
PLATELET # BLD AUTO: 284 K/UL (ref 150–450)
POTASSIUM SERPL-SCNC: 3.6 MMOL/L (ref 3.5–5.1)
PROT SERPL-MCNC: 7.5 G/DL (ref 6.3–8.2)
PT BLD: 22.7 SEC (ref 9–12)
SODIUM SERPL-SCNC: 140 MMOL/L (ref 137–145)
VANCOMYCIN SERPL-MCNC: 19.1 UG/ML
WBC # BLD AUTO: 13.2 K/UL (ref 3.8–10.6)

## 2018-10-03 RX ADMIN — INSULIN ASPART SCH UNIT: 100 INJECTION, SOLUTION INTRAVENOUS; SUBCUTANEOUS at 18:01

## 2018-10-03 RX ADMIN — INSULIN ASPART SCH UNIT: 100 INJECTION, SOLUTION INTRAVENOUS; SUBCUTANEOUS at 07:57

## 2018-10-03 RX ADMIN — METOPROLOL TARTRATE SCH MG: 50 TABLET, FILM COATED ORAL at 21:32

## 2018-10-03 RX ADMIN — DOCUSATE SODIUM AND SENNOSIDES SCH EACH: 50; 8.6 TABLET ORAL at 07:58

## 2018-10-03 RX ADMIN — INSULIN DETEMIR SCH UNIT: 100 INJECTION, SOLUTION SUBCUTANEOUS at 21:34

## 2018-10-03 RX ADMIN — ASPIRIN 81 MG CHEWABLE TABLET SCH MG: 81 TABLET CHEWABLE at 21:32

## 2018-10-03 RX ADMIN — SODIUM CHLORIDE SCH DROPS: 50 SOLUTION/ DROPS OPHTHALMIC at 08:00

## 2018-10-03 RX ADMIN — FAMOTIDINE SCH MG: 20 TABLET, FILM COATED ORAL at 07:58

## 2018-10-03 RX ADMIN — METOPROLOL TARTRATE SCH MG: 50 TABLET, FILM COATED ORAL at 07:58

## 2018-10-03 RX ADMIN — CEFAZOLIN SCH MLS/HR: 330 INJECTION, POWDER, FOR SOLUTION INTRAMUSCULAR; INTRAVENOUS at 07:58

## 2018-10-03 RX ADMIN — SODIUM CHLORIDE SCH DROPS: 50 SOLUTION/ DROPS OPHTHALMIC at 21:32

## 2018-10-03 RX ADMIN — THERA TABS SCH EACH: TAB at 07:58

## 2018-10-03 RX ADMIN — DEXTROSE MONOHYDRATE SCH MLS/HR: 5 INJECTION, SOLUTION INTRAVENOUS at 17:11

## 2018-10-03 RX ADMIN — INSULIN ASPART SCH UNIT: 100 INJECTION, SOLUTION INTRAVENOUS; SUBCUTANEOUS at 12:32

## 2018-10-03 RX ADMIN — CEFAZOLIN SCH MLS/HR: 330 INJECTION, POWDER, FOR SOLUTION INTRAMUSCULAR; INTRAVENOUS at 21:34

## 2018-10-03 RX ADMIN — LINAGLIPTIN SCH MG: 5 TABLET, FILM COATED ORAL at 21:32

## 2018-10-03 RX ADMIN — LEVOTHYROXINE SODIUM SCH MCG: 50 TABLET ORAL at 06:19

## 2018-10-03 RX ADMIN — DEXTROSE MONOHYDRATE SCH MLS/HR: 5 INJECTION, SOLUTION INTRAVENOUS at 06:19

## 2018-10-03 NOTE — P.PN
Subjective


Progress Note Date: 10/03/18





83-year-old  female patient of Dr. Pardo resides at [facility] with past medical history of atrial fibrillation on Coumadin, diabetes 

mellitus with neuropathy, hyperlipidemia, hypertension, thyroid disorder, 

history of peripheral artery disease status post to right lower extremity 

bypasses by Dr. Tsai, history of critical limb ischemia and nonhealing 

ulcer of right 11th in 2016 history of UTIs, urinary incontinence history of 

MRSA in 2016 presents with swelling and pain in the right lower extremity.  

Patient comes to the ER with significant swelling on involving the right lower 

extremity with erythema for the past few weeks.  Arterial study was done as 

outpatient by Dr. Pardo that suggested worsening of the peripheral artery 

disease.  Venous Doppler was done in the ER to rule out DVT that was negative 

for any occlusion.  Patient continues to complain of significant pain in the 

right lower extremity at the level of groin.  Patient is wheelchair bound and 

is a long-term patient at discharge.  Patient is hard of hearing and is unable 

to provide any history by herself history is obtained by the previous 

documentation and ER documentation.  On evaluation in the ER, where a suggested 

temp of 101, tachycardia 101, blood pressure 120/60 saturating well on room 

air.  Lab obtained suggest leukocytosis of 22.4, hemoglobin 10.3 which is close 

to patient's baseline.  INR obtained suggest 3.1, BNP suggestive sodium 132, 

potassium 5.2, V1 108 which is increased from the baseline, creatinine 2.35 and 

GFR decreased to 21 from 38 urinalysis obtained as suggest some leukocytosis 13

, with positive nitrates and leukocyte esterase. 








10/2: Consults and place with Dr. Napier and Dr. Ross.  Dr. Napier does not 

plan for any intervention at this time. If patient's kidney function improve 

she may benefit from angiogram. Patient does have pulses palpable in her lower 

extremities.  Perez catheter is to be exchanged today.  She has stage II 

pressure ulcers of the buttocks that were present on admission.  She has been 

afebrile since admission.  White count is down to 15.1.  BUN 98 creatinine 

2.44.  She is currently on cefepime and vancomycin that have been changed over 

to Zosyn and vancomycin.  Patient remains pleasantly confused and also suffers 

from difficulty hearing patient communication difficult.  Abdominal pain seems 

to have resolved.  Hip x-ray showed no fracture or dislocation.  CT of the 

abdomen and pelvis shows atrophic right kidney.  Cardiomegaly.  Coronary artery 

disease.  Hiatal hernia.  Renal ultrasound shows atrophic changes to the right 

kidney as on prior exam.  Cystic focus lower pole left kidney is similar to 

prior exam.





10/3: Dr. Ross has added medihoney for local wound care on the right toes.  

Patient has been seen by nephrology and recommends avoiding vancomycin.  

Patient is currently on Zosyn and vancomycin.  Iron level is low and patient 

will be giving Ferrlecit one dose.  She did cough up small blood clot but 

otherwise has not had a cough.  Blood culture and urine culture are in 

progress.  Anticipate discharge back to [facility] tomorrow.











Objective





- Vital Signs


Vital signs: 


 Vital Signs











Temp  98.0 F   10/03/18 07:00


 


Pulse  94   10/03/18 07:00


 


Resp  20   10/03/18 07:00


 


BP  132/67   10/03/18 07:00


 


Pulse Ox  99   10/03/18 07:00








 Intake & Output











 10/02/18 10/03/18 10/03/18





 18:59 06:59 18:59


 


Intake Total 480 100 360


 


Output Total  1650 


 


Balance 480 -1550 360


 


Intake:   


 


  Oral 480 100 360


 


Output:   


 


  Urine  1650 


 


Other:   


 


  Voiding Method Indwelling Catheter Indwelling Catheter Indwelling Catheter














- Exam





General appearance: Unable to provide any history, cooperative, moderate acute 

distress, obese





- EENT


Eyes: anicteric sclerae, PERRLA, normal appearance


ENT: Hard of hearing


- Neck


Neck: no lymphadenopathy, normal ROM, no other, no rigidity, no stridor, no 

thyromegaly





- Respiratory


Respiratory: bilateral: CTA, negative: diminished, dullness, rales, rhonchi





- Cardiovascular


Rhythm: regular


Heart sounds: normal: S1, S2


Abnormal Heart Sounds: no systolic murmur, no diastolic murmur, no rub, no S3 

Gallop, no S4 Gallop, no click, no other





- Gastrointestinal


General gastrointestinal: normal bowel sounds, soft





- Integumentary


Integumentary: no rash fourth and fifth right lower extremity digits with the 

necrotic tip with serous drainage and bad odor.  With significant lower 

extremity edema and redness involving the medial side of the thigh and shin 





- Neurologic


Neurologic: CNII-XII intact





- Musculoskeletal


Musculoskeletal: Gait could not be assessed due to generalized weakness, 

strength equal bilaterally decreased in the lower extremity 3+/5





- Psychiatric


Psychiatric: A&O x's3, appropriate affect





- Labs


CBC & Chem 7: 


 10/03/18 10:22





 10/03/18 10:22


Labs: 


 Abnormal Lab Results - Last 24 Hours (Table)











  10/02/18 10/02/18 10/02/18 Range/Units





  08:20 12:14 16:59 


 


POC Glucose (mg/dL)   110 H  213 H  (75-99)  mg/dL


 


Iron  13 L    ()  ug/dL


 


Iron Saturation  4.66 L    (12.00-45.00)   














  10/02/18 10/03/18 Range/Units





  20:51 07:10 


 


POC Glucose (mg/dL)  287 H  115 H  (75-99)  mg/dL


 


Iron    ()  ug/dL


 


Iron Saturation    (12.00-45.00)   








 Microbiology - Last 24 Hours (Table)











 10/02/18 14:21 Urine Culture - Preliminary





 Urine,Catheterized 


 


 10/01/18 12:45 Blood Culture - Preliminary





 Blood    No Growth after 24 hours














Assessment and Plan


Plan: 





#1 sepsis secondary to the catheter associated urinary tract infection.  Perez 

catheter to be changed.  IV antibiotics have been changed to Zosyn and 

daptomycin by Dr. Ross.  Continue to monitor closely.  Urine culture is in 

progress.





#2.  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  History of 

peripheral artery disease status post bypass and stenting with Dr. Tsai and 

Dr. Wood.  Consult with Dr. Napier.  No plan for any intervention.  If 

patient kidney function improve she may benefit from angiogram.





#3 paroxysmal atrial fibrillation on Coumadin.  INR 3.1 hold Coumadin today.  

Repeat INR tomorrow





#4 hyperkalemia secondary to acute kidney injury on CKD.  Kayexalate ordered.  

EKG ordered repeat BMP tomorrow hold lisinopril and potassium tablet





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  Status post 

2 L IV fluid continue normal saline at 75 mL per hour.  Hold Lasix, 

hydrochlorothiazide and amiloride.  Hold lisinopril.  Nephrology consult 

appreciated.  CT abdomen and renal ultrasound as above.





#6 UTI urine culture ordered.





#7 diabetes2 with diabetic neuropathy continue Lantus 30 units daily at bedtime 

with 10 units lispro with sliding scale





#8 hypertension controlled on metoprolol  hold Lasix hold hydrochlorothiazide 

and amiloride.  Hold lisinopril for hyper hyperkalemia





#10 DVT prophylaxis INR therapeutic hold Coumadin INR tomorrow





#11 GI prophylaxis Pepcid 20 mg by mouth daily





#12 CODE STATUS full code





#13 right lower quadrant abdominal pain, resolved.  No acute abnormality on CAT 

scan.





Discharge plan: Return to [facility] on Thursday.





Impression and plan of care have been directed as dictated by the signing 

physician.  Candy Kumar nurse practitioner acting as scribe for signing 

physician.

## 2018-10-03 NOTE — PN
PROGRESS NOTE



Patient is seen for followup for acute kidney injury.  Her serum creatinine was 2.3

mg/dL and it increased to 2.4 on admission.  Patient's blood pressure has been on the

lower side.  Her previous creatinine was 1.2 to 1.4 mg/dL.  Yesterday antihypertensive

medications were held and serum creatinine has improved to 2.1 mg/dL today.  Patient

was also on vancomycin. Random vancomycin level is 19.1.  Currently she is maintained

on IV fluids at 75 mL/hour.



On examination, blood pressure was 132/67, heart rate 94 per minute. Patient is

afebrile.

EXAMINATION OF THE HEART: S1, S2.

EXAMINATION OF LUNGS: Bilateral breath sounds are heard.

ABDOMEN:  Soft, non-tender, obese.

Examination of lower extremities shows chronic skin changes. Right leg is currently

wrapped. There is 1+ edema noted.

CNS exam is grossly intact.



ASSESSMENT:

1. Acute kidney injury secondary to hypotension and underlying infection, currently

    improved.  Serum creatinine is down to 2.1.  The patient is non-oliguric.  UA shows

    some WBCs.  There is no proteinuria. Patient has trace hematuria.  She has an

    indwelling Perez catheter. Abdominal CT did not show any significant abnormalities

    in the kidney; however, patient does have an atrophic right kidney and there is a

    cyst in the left kidney.  No retention or hydronephrosis was noted.  Vancomycin

    level was 19.

2. Chronic kidney disease, NKF stage III, secondary to nephrosclerosis.  Baseline

    creatinine about 1.2 to 1.4.

3. Hyperkalemia on initial admission associated with acute kidney injury. ACE

    inhibitors are on hold.  Renal function has improved.

4. Iron deficiency, maintained on IV iron.



PLAN:

Continue off of vancomycin.  I will decrease the IV fluids.  I will give her 2 more

doses of IV iron, as iron saturation was only 4%.





MMODL / IJN: [id number] / Job#: [id number]

## 2018-10-03 NOTE — CDI
Last Revision, December 2017





Documentation Clarification Form



Date: 10/3/2018 8:59:48 AM

From: Olena Rubio RN, CCDS

Phone: [phone]

MRN: [id number]

Admit Date: 10/1/2018 2:43:00 PM

Patient Name: Dinah Field

Visit Number: [id number]

Discharge Date:



ATTENTION: The Clinical Documentation Specialists (CDI) and [facility] Coding Staff 
appreciate your assistance in clarifying documentation. Please respond to the 
clarification below the line at the bottom and electronically sign. The CDI & 
[facility] Coding staff will review the response and follow-up if needed. Please note: 
Queries are made part of the Legal Health Record. If you have any questions, 
please contact the author of this message via ITS.



aRdha Padilla MD



A diagnosis of UTI has been documented in the ED, H/P and progress notes.    

 .

History/Risk factors: Chronic Perez catheter, Paroxysmal atrial fibrillation, 
Diabetes mellitus, CKD stage 3, 



Per documentation in the ED assessment this patient was admitted with an 
indwelling Perez catheter for urinary retention.



Clinical Indicators: Per your progress notes on 10/2/18 presents to hospital 
with sepsis most likely secondary to urinary tract infection.

Urinalysis: Ur Leukocyte Esterase Large, Urine bacteria occasional 

Urine culture: Pending

Lab results: WBC 22.4, Neutrophils 18.8, , CR 2.35,  Lactic acid 1.4, c-
reactive protein 83.6 



Treatment: 

10/2 Perez catheter change

Monotor Labs

Vancomycin IV

Zosyn IV

IV Fluids



In your professional opinion, can you please clarify the etiology of the UTI, 
if known?



Perez catheter related UTI

UTI not related to catheter

Other condition, please specify  

Unable to determine



If an infective organism is present, please specify cause and effect 
relationship if applicable.



Please continue to document in your progress notes and discharge summary in 
order to capture severity of illness and risk of mortality. Include clinical 
findings that support your diagnosis.

_______________Foley catheter related UTI ______________________________________
______________
MTDD

## 2018-10-04 LAB
ALBUMIN SERPL-MCNC: 2.9 G/DL (ref 3.5–5)
ALP SERPL-CCNC: 93 U/L (ref 38–126)
ALT SERPL-CCNC: 29 U/L (ref 9–52)
ANION GAP SERPL CALC-SCNC: 13 MMOL/L
AST SERPL-CCNC: 25 U/L (ref 14–36)
BUN SERPL-SCNC: 71 MG/DL (ref 7–17)
CALCIUM SPEC-MCNC: 8.6 MG/DL (ref 8.4–10.2)
CHLORIDE SERPL-SCNC: 107 MMOL/L (ref 98–107)
CO2 SERPL-SCNC: 21 MMOL/L (ref 22–30)
ERYTHROCYTE [DISTWIDTH] IN BLOOD BY AUTOMATED COUNT: 3.31 M/UL (ref 3.8–5.4)
ERYTHROCYTE [DISTWIDTH] IN BLOOD: 13.3 % (ref 11.5–15.5)
GLUCOSE BLD-MCNC: 131 MG/DL (ref 75–99)
GLUCOSE BLD-MCNC: 167 MG/DL (ref 75–99)
GLUCOSE BLD-MCNC: 172 MG/DL (ref 75–99)
GLUCOSE BLD-MCNC: 90 MG/DL (ref 75–99)
GLUCOSE SERPL-MCNC: 137 MG/DL (ref 74–99)
HCT VFR BLD AUTO: 30.4 % (ref 34–46)
HGB BLD-MCNC: 9.4 GM/DL (ref 11.4–16)
INR PPP: 2 (ref ?–1.2)
MCH RBC QN AUTO: 28.3 PG (ref 25–35)
MCHC RBC AUTO-ENTMCNC: 30.8 G/DL (ref 31–37)
MCV RBC AUTO: 92 FL (ref 80–100)
PLATELET # BLD AUTO: 247 K/UL (ref 150–450)
POTASSIUM SERPL-SCNC: 3.5 MMOL/L (ref 3.5–5.1)
PROT SERPL-MCNC: 6.9 G/DL (ref 6.3–8.2)
PT BLD: 18.2 SEC (ref 9–12)
SODIUM SERPL-SCNC: 141 MMOL/L (ref 137–145)
WBC # BLD AUTO: 12.5 K/UL (ref 3.8–10.6)

## 2018-10-04 RX ADMIN — INSULIN ASPART SCH UNIT: 100 INJECTION, SOLUTION INTRAVENOUS; SUBCUTANEOUS at 12:33

## 2018-10-04 RX ADMIN — ASPIRIN 81 MG CHEWABLE TABLET SCH MG: 81 TABLET CHEWABLE at 20:47

## 2018-10-04 RX ADMIN — SODIUM CHLORIDE SCH DROPS: 50 SOLUTION/ DROPS OPHTHALMIC at 08:05

## 2018-10-04 RX ADMIN — DOCUSATE SODIUM AND SENNOSIDES SCH EACH: 50; 8.6 TABLET ORAL at 08:05

## 2018-10-04 RX ADMIN — LINAGLIPTIN SCH MG: 5 TABLET, FILM COATED ORAL at 20:47

## 2018-10-04 RX ADMIN — INSULIN ASPART SCH: 100 INJECTION, SOLUTION INTRAVENOUS; SUBCUTANEOUS at 08:04

## 2018-10-04 RX ADMIN — SODIUM FERRIC GLUCONATE COMPLEX SCH MLS/HR: 12.5 INJECTION INTRAVENOUS at 09:57

## 2018-10-04 RX ADMIN — METOPROLOL TARTRATE SCH MG: 50 TABLET, FILM COATED ORAL at 08:05

## 2018-10-04 RX ADMIN — INSULIN DETEMIR SCH UNIT: 100 INJECTION, SOLUTION SUBCUTANEOUS at 21:10

## 2018-10-04 RX ADMIN — FAMOTIDINE SCH MG: 20 TABLET, FILM COATED ORAL at 08:05

## 2018-10-04 RX ADMIN — SODIUM CHLORIDE SCH DROPS: 50 SOLUTION/ DROPS OPHTHALMIC at 20:48

## 2018-10-04 RX ADMIN — THERA TABS SCH EACH: TAB at 12:32

## 2018-10-04 RX ADMIN — DEXTROSE MONOHYDRATE SCH MLS/HR: 5 INJECTION, SOLUTION INTRAVENOUS at 06:44

## 2018-10-04 RX ADMIN — METOPROLOL TARTRATE SCH MG: 50 TABLET, FILM COATED ORAL at 20:47

## 2018-10-04 RX ADMIN — LEVOTHYROXINE SODIUM SCH MCG: 50 TABLET ORAL at 06:44

## 2018-10-04 RX ADMIN — DEXTROSE MONOHYDRATE SCH MLS/HR: 5 INJECTION, SOLUTION INTRAVENOUS at 18:11

## 2018-10-04 RX ADMIN — INSULIN ASPART SCH UNIT: 100 INJECTION, SOLUTION INTRAVENOUS; SUBCUTANEOUS at 17:37

## 2018-10-04 NOTE — P.PN
Subjective





Patient is seen in follow-up for acute kidney injury on chronic kidney disease.

  Patient has chronic kidney disease stage III with baseline creatinine in the 

range of 1.2-1.4.  Renal function is better today with creatinine down to 1.92.

  She is noted to have an atrophic right kidney.  ACE inhibitor is held.  Blood 

pressure is well controlled.  She is nonoliguric.  Oral intake is good.





Vital signs are stable.


General: The patient appeared well nourished and normally developed. 


HEENT: Head exam is unremarkable. Neck is without jugular venous distension.


LUNGS: Lungs are clear to auscultation and percussion. Breath sounds decreased.


HEART: Rate and Rhythm are regular. First and second heart sounds normal. No 

murmurs, rubs or gallops. 


ABDOMEN: Abdominal exam reveals normal bowel sounds. Non-tender and non-

distended. No evidence of peritonitis.


EXTREMITITES: No clubbing, cyanosis, or edema.





Objective





- Vital Signs


Vital signs: 


 Vital Signs











Temp  99.2 F   10/04/18 07:00


 


Pulse  94   10/04/18 07:00


 


Resp  16   10/04/18 07:00


 


BP  138/75   10/04/18 07:00


 


Pulse Ox  99   10/04/18 07:00








 Intake & Output











 10/03/18 10/04/18 10/04/18





 18:59 06:59 18:59


 


Intake Total 600  200


 


Output Total 1225 2100 


 


Balance -625 -2100 200


 


Intake:   


 


  Oral 600  200


 


Output:   


 


  Urine 1225 2100 


 


    Uretheral (Perez)  800 


 


Other:   


 


  Voiding Method Indwelling Catheter Indwelling Catheter Indwelling Catheter


 


  # Bowel Movements 1  














- Labs


CBC & Chem 7: 


 10/04/18 08:57





 10/04/18 08:57


Labs: 


 Abnormal Lab Results - Last 24 Hours (Table)











  10/03/18 10/03/18 10/04/18 Range/Units





  16:54 20:46 08:57 


 


WBC     (3.8-10.6)  k/uL


 


RBC     (3.80-5.40)  m/uL


 


Hgb     (11.4-16.0)  gm/dL


 


Hct     (34.0-46.0)  %


 


MCHC     (31.0-37.0)  g/dL


 


PT     (9.0-12.0)  sec


 


INR     (<1.2)  


 


Carbon Dioxide    21 L  (22-30)  mmol/L


 


BUN    71 H  (7-17)  mg/dL


 


Creatinine    1.92 H  (0.52-1.04)  mg/dL


 


Glucose    137 H  (74-99)  mg/dL


 


POC Glucose (mg/dL)  164 H  193 H   (75-99)  mg/dL


 


Albumin    2.9 L  (3.5-5.0)  g/dL














  10/04/18 10/04/18 10/04/18 Range/Units





  08:57 08:57 12:03 


 


WBC  12.5 H    (3.8-10.6)  k/uL


 


RBC  3.31 L    (3.80-5.40)  m/uL


 


Hgb  9.4 L    (11.4-16.0)  gm/dL


 


Hct  30.4 L    (34.0-46.0)  %


 


MCHC  30.8 L    (31.0-37.0)  g/dL


 


PT   18.2 H   (9.0-12.0)  sec


 


INR   2.0 H   (<1.2)  


 


Carbon Dioxide     (22-30)  mmol/L


 


BUN     (7-17)  mg/dL


 


Creatinine     (0.52-1.04)  mg/dL


 


Glucose     (74-99)  mg/dL


 


POC Glucose (mg/dL)    172 H  (75-99)  mg/dL


 


Albumin     (3.5-5.0)  g/dL








 Microbiology - Last 24 Hours (Table)











 10/02/18 14:21 Urine Culture - Final





 Urine,Catheterized 


 


 10/01/18 12:45 Blood Culture - Preliminary





 Blood    No Growth after 48 hours














Assessment and Plan


Plan: 





Assessment:


1.  Nonoliguric acute kidney injury secondary to ATN secondary to hypotension 

and underlying infection.  Renal function improving with creatinine down to 

1.92 today.  Vancomycin level 19.1 as of yesterday.


2.  Chronic kidney disease stage III secondary to nephrosclerosis with baseline 

creatinine in the range of 1.2-1.4.


3.  Hyperkalemia secondary to acute kidney injury and a slightly better.  

Resolved.


4.  Anemia with severe iron deficiency maintained on IV iron.


5.  Catheter associated UTI maintained on antibiotics per infectious disease.


6.  Hypertension with chronic kidney disease.  Controlled.


7.  Mild metabolic acidosis secondary to acute kidney injury and IV fluids.


8.  Insulin-dependent diabetes mellitus.





Plan:


Hep-Lock IV fluids.


Encourage oral intake.


Avoid nephrotoxins.


20 mEq daily today.


Check magnesium level as well.


Vancomycin discontinued.  Now on daptomycin and Zosyn.


Possible discharge tomorrow.  Follow up outpatient in the next 1-2 weeks.

## 2018-10-05 VITALS — HEART RATE: 80 BPM | SYSTOLIC BLOOD PRESSURE: 144 MMHG | DIASTOLIC BLOOD PRESSURE: 79 MMHG | TEMPERATURE: 99.3 F

## 2018-10-05 VITALS — RESPIRATION RATE: 18 BRPM

## 2018-10-05 LAB
ANION GAP SERPL CALC-SCNC: 9 MMOL/L
BUN SERPL-SCNC: 63 MG/DL (ref 7–17)
CALCIUM SPEC-MCNC: 8.9 MG/DL (ref 8.4–10.2)
CHLORIDE SERPL-SCNC: 106 MMOL/L (ref 98–107)
CO2 SERPL-SCNC: 25 MMOL/L (ref 22–30)
ERYTHROCYTE [DISTWIDTH] IN BLOOD BY AUTOMATED COUNT: 3.49 M/UL (ref 3.8–5.4)
ERYTHROCYTE [DISTWIDTH] IN BLOOD: 13.5 % (ref 11.5–15.5)
GLUCOSE BLD-MCNC: 102 MG/DL (ref 75–99)
GLUCOSE BLD-MCNC: 91 MG/DL (ref 75–99)
GLUCOSE SERPL-MCNC: 131 MG/DL (ref 74–99)
HCT VFR BLD AUTO: 30.7 % (ref 34–46)
HGB BLD-MCNC: 9.6 GM/DL (ref 11.4–16)
INR PPP: 1.7 (ref ?–1.2)
MAGNESIUM SPEC-SCNC: 2.1 MG/DL (ref 1.6–2.3)
MCH RBC QN AUTO: 27.7 PG (ref 25–35)
MCHC RBC AUTO-ENTMCNC: 31.4 G/DL (ref 31–37)
MCV RBC AUTO: 88.1 FL (ref 80–100)
PLATELET # BLD AUTO: 278 K/UL (ref 150–450)
POTASSIUM SERPL-SCNC: 3.9 MMOL/L (ref 3.5–5.1)
PT BLD: 15.3 SEC (ref 9–12)
SODIUM SERPL-SCNC: 140 MMOL/L (ref 137–145)
VANCOMYCIN SERPL-MCNC: 10.7 UG/ML
WBC # BLD AUTO: 11.1 K/UL (ref 3.8–10.6)

## 2018-10-05 RX ADMIN — SODIUM FERRIC GLUCONATE COMPLEX SCH MLS/HR: 12.5 INJECTION INTRAVENOUS at 10:03

## 2018-10-05 RX ADMIN — DOCUSATE SODIUM AND SENNOSIDES SCH EACH: 50; 8.6 TABLET ORAL at 08:07

## 2018-10-05 RX ADMIN — DEXTROSE MONOHYDRATE SCH MLS/HR: 5 INJECTION, SOLUTION INTRAVENOUS at 05:35

## 2018-10-05 RX ADMIN — SODIUM CHLORIDE SCH DROPS: 50 SOLUTION/ DROPS OPHTHALMIC at 08:05

## 2018-10-05 RX ADMIN — THERA TABS SCH EACH: TAB at 12:25

## 2018-10-05 RX ADMIN — INSULIN ASPART SCH UNIT: 100 INJECTION, SOLUTION INTRAVENOUS; SUBCUTANEOUS at 08:04

## 2018-10-05 RX ADMIN — FAMOTIDINE SCH MG: 20 TABLET, FILM COATED ORAL at 08:05

## 2018-10-05 RX ADMIN — LEVOTHYROXINE SODIUM SCH MCG: 50 TABLET ORAL at 06:12

## 2018-10-05 RX ADMIN — INSULIN ASPART SCH UNIT: 100 INJECTION, SOLUTION INTRAVENOUS; SUBCUTANEOUS at 13:23

## 2018-10-05 RX ADMIN — METOPROLOL TARTRATE SCH MG: 50 TABLET, FILM COATED ORAL at 08:04

## 2018-10-05 NOTE — P.DS
Providers


Date of admission: 


10/01/18 14:43





Expected date of discharge: 10/05/18


Attending physician: 


Radha Escalante MD





Consults: 





 





10/01/18 15:38


Consult Physician Routine 


   Consulting Provider: Chris Olson


   Consult Reason/Comments: GAVIN on CKD


   Do you want consulting provider notified?: Yes





10/01/18 15:39


Consult Physician Routine 


   Consulting Provider: Osbaldo Ross


   Consult Reason/Comments: cellulitis right lower extremity


   Do you want consulting provider notified?: Yes





10/01/18 16:19


Consult Physician Routine 


   Consulting Provider: Enrrique Napier


   Consult Reason/Comments: h/o PAD, new ulcer 4th and 5th right toes


   Do you want consulting provider notified?: Yes











Primary care physician: 


Azalea Pardo





Hospital Course: 





83-year-old  female patient of Dr. Pardo resides at [facility] with past medical history of atrial fibrillation on Coumadin, diabetes 

mellitus with neuropathy, hyperlipidemia, hypertension, thyroid disorder, 

history of peripheral artery disease status post to right lower extremity 

bypasses by Dr. Tsai, history of critical limb ischemia and nonhealing 

ulcer of right 11th in 2016 history of UTIs, urinary incontinence history of 

MRSA in 2016 presents with swelling and pain in the right lower extremity.  

Patient comes to the ER with significant swelling on involving the right lower 

extremity with erythema for the past few weeks.  Arterial study was done as 

outpatient by Dr. Pardo that suggested worsening of the peripheral artery 

disease.  Venous Doppler was done in the ER to rule out DVT that was negative 

for any occlusion.  Patient continues to complain of significant pain in the 

right lower extremity at the level of groin.  Patient is wheelchair bound and 

is a long-term patient at discharge.  Patient is hard of hearing and is unable 

to provide any history by herself history is obtained by the previous 

documentation and ER documentation.  On evaluation in the ER, where a suggested 

temp of 101, tachycardia 101, blood pressure 120/60 saturating well on room 

air.  Lab obtained suggest leukocytosis of 22.4, hemoglobin 10.3 which is close 

to patient's baseline.  INR obtained suggest 3.1, BNP suggestive sodium 132, 

potassium 5.2, V1 108 which is increased from the baseline, creatinine 2.35 and 

GFR decreased to 21 from 38 urinalysis obtained as suggest some leukocytosis 13

, with positive nitrates and leukocyte esterase. 








10/2: Consults and place with Dr. Napier and Dr. Ross.  Dr. Napier does not 

plan for any intervention at this time. If patient's kidney function improve 

she may benefit from angiogram. Patient does have pulses palpable in her lower 

extremities.  Perez catheter is to be exchanged today.  She has stage II 

pressure ulcers of the buttocks that were present on admission.  She has been 

afebrile since admission.  White count is down to 15.1.  BUN 98 creatinine 

2.44.  She is currently on cefepime and vancomycin that have been changed over 

to Zosyn and vancomycin.  Patient remains pleasantly confused and also suffers 

from difficulty hearing patient communication difficult.  Abdominal pain seems 

to have resolved.  Hip x-ray showed no fracture or dislocation.  CT of the 

abdomen and pelvis shows atrophic right kidney.  Cardiomegaly.  Coronary artery 

disease.  Hiatal hernia.  Renal ultrasound shows atrophic changes to the right 

kidney as on prior exam.  Cystic focus lower pole left kidney is similar to 

prior exam.





10/3: Dr. Ross has added medihoney for local wound care on the right toes.  

Patient has been seen by nephrology and recommends avoiding vancomycin.  

Patient is currently on Zosyn and vancomycin.  Iron level is low and patient 

will be giving Ferrlecit one dose.  She did cough up small blood clot but 

otherwise has not had a cough.  Blood culture and urine culture are in 

progress.  Anticipate discharge back to [facility] tomorrow.





10/4: Nephrology has seen the patient and added and 2 more doses of Ferrlecit 

which will be completed tomorrow.  Urine culture is showing no growth but note 

that this was obtained after 24 hours of antibiotics.  White count is at 12.5, 

hemoglobin 9.4.  Creatinine is down to 1.92.  INR 2.0 and Coumadin will be 

resumed at her normal dose.  Patient remains pleasantly confused..





10/5: Patient will follow up with Barak Tsai and Ange. White count is11.1, 

hgb 9.6, BUN 63 and creatinine 1.82. Dr. Ross has clarified antibiotics with 

doxycycline. Patient will return to [facility] today in stable condition.





Discharge diagnoses:


#1 sepsis secondary to the catheter associated urinary tract infection.  


#2.  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  History of 

peripheral artery disease status post bypass and stenting with Dr. Tsai and 

Dr. Wood.  


#3 paroxysmal atrial fibrillation on Coumadin.  


#4 hyperkalemia secondary to acute kidney injury on CKD. 


#5 GAVIN on CKD 3


#6 UTI 


#7 diabetes2 with diabetic neuropathy


#8 hypertension 


#10 right lower quadrant abdominal pain, resolved.  No acute abnormality on CAT 

scan.


14.  Anemia of chronic disease.  





Discharge plan: Return to [facility]





Impression and plan of care have been directed as dictated by the signing 

physician.  Candy Kumar nurse practitioner acting as scribe for signing 

physician.











Patient Condition at Discharge: Good





Plan - Discharge Summary


Discharge Rx Participant: No


New Discharge Prescriptions: 


New


   Metoprolol Tartrate [Lopressor] 75 mg PO BID  tab





Continue


   Aspirin 81 mg PO HS


   Pravastatin Sodium [Pravachol] 80 mg PO HS


   Warfarin [Coumadin] 3 mg PO SUMOTUTHSA


   Levothyroxine Sodium [Synthroid] 50 mcg PO QAM


   sitaGLIPtin [Januvia] 50 mg PO HS #1 tab


   Insulin Glargine [Lantus] 30 unit SQ HS


   INSULIN LISPRO (humaLOG) [humaLOG] 10 units SQ AC-TID@07,11,16


   Sennosides-Docusate Sodium [Senokot-S] 1 tab PO DAILY


   Acetaminophen Tab [Tylenol] 650 mg PO Q6H PRN


     PRN Reason: Pain


   Sodium Chloride 5% Ophth Soln [Vahid 128] 1 drop BOTH EYES BID


   Multivitamins, Thera [Multivitamin (formulary)] 1 tab PO DAILY


   Potassium Chloride ER [K-Dur 10] 10 meq PO DAILY


   Warfarin [Coumadin] 6 mg PO WEFR





Changed


   Furosemide [Lasix] 40 mg PO DAILY #0





Discontinued


   aMILoride-HCTZ 5-50 mg [Moduretic 5-50] 1 tab PO DAILY


   amLODIPine [Norvasc] 5 mg PO DAILY


   Lisinopril [Zestril] 10 mg PO BID


   Metoprolol Tartrate [Lopressor] 100 mg PO BID #1 tab


Discharge Medication List





Aspirin 81 mg PO HS 02/08/16 [History]


Levothyroxine Sodium [Synthroid] 50 mcg PO QAM 02/08/16 [History]


Pravastatin Sodium [Pravachol] 80 mg PO HS 02/08/16 [History]


Warfarin [Coumadin] 3 mg PO SUMOTUTHSA 02/08/16 [History]


sitaGLIPtin [Januvia] 50 mg PO HS #1 tab 06/21/16 [Rx]


Insulin Glargine [Lantus] 30 unit SQ HS 07/06/17 [History]


INSULIN LISPRO (humaLOG) [humaLOG] 10 units SQ AC-TID@07,11,16 11/13/17 [History

]


Sennosides-Docusate Sodium [Senokot-S] 1 tab PO DAILY 11/13/17 [History]


Acetaminophen Tab [Tylenol] 650 mg PO Q6H PRN 10/01/18 [History]


Multivitamins, Thera [Multivitamin (formulary)] 1 tab PO DAILY 10/01/18 [History

]


Potassium Chloride ER [K-Dur 10] 10 meq PO DAILY 10/01/18 [History]


Sodium Chloride 5% Ophth Soln [Vahid 128] 1 drop BOTH EYES BID 10/01/18 [History]


Warfarin [Coumadin] 6 mg PO WEFR 10/01/18 [History]


Furosemide [Lasix] 40 mg PO DAILY #0 10/05/18 [Rx]


Metoprolol Tartrate [Lopressor] 75 mg PO BID  tab 10/05/18 [Rx]








Follow up Appointment(s)/Referral(s): 


Azalea Pardo MD [Primary Care Provider] - 1-2 days (at [facility])


George Cassidy MD [STAFF PHYSICIAN] - 1 Week


[facility], [NON-STAFF] - 1 Week


Eddie Tsai DO [Doctor of Osteopathic Medicine] - 1 Week


Ambulatory/Diagnostic Orders: 


Prothrombin Time INR [LAB.AMB] Location: None Selected


Patient Instructions/Handouts:  Sepsis (IP), Gangrene (DC)


Activity/Diet/Wound Care/Special Instructions: 


Cardiac, diabetic diet





Perez to gravity, pericare every 8 hours.





Change position every 2 hours while awake, stay off pressure ulcer on buttock





therahoney to pressure ulcers MWF. . 





Right foot dressing change daily with dry dressing and kerlex

## 2018-10-05 NOTE — PN
PROGRESS NOTE



Patient is seen for followup for acute kidney injury.  She is currently sitting up in a

bedside chair.  Patient states she feels well.  Her kidney function has improved with

the serum creatinine down from 2.3 to 1.8 mg/dL.  The patient denies any significant

complaints.  She is maintained on IV iron.  She is not on any diuretics or IV fluids.

The patient has had good oral intake.



PHYSICAL EXAMINATION:

Blood pressure is 144/79, heart rate 80 per minute.  She is afebrile.  Examination of

the heart S1, S2.  Examination of the lungs bilateral breath sounds are heard.  Abdomen

is soft, nontender.  Examination of lower extremities shows chronic skin changes,

chronic edema bilaterally.  Legs are wrapped.



LABS:

Revealed sodium 140, potassium 3.9, chloride 106, BUN 63, serum creatinine 1.82,

hemoglobin 9.6 g/dL.



ASSESSMENT:

1. Acute kidney injury, nonoliguric, currently improving.  Continue to encourage

    increased oral intake.  The patient is not on any IV fluids or diuretics.  She does

    have an atrophic right kidney.  She is off of ACE inhibitors.  The vancomycin level

    was slightly on the high side at 19.  It is currently down to 10.

2. Chronic kidney disease stage 3 secondary to nephrosclerosis.  Baseline creatinine

    1.2-1.4.

3. Hyperkalemia associated with acute kidney injury, now improved and resolved.

4. Urinary tract infection with indwelling Perez catheter, being followed by ID.

5. Hypertension currently controlled.  Hypertension with chronic kidney disease,

    currently controlled.

6. Type 2 diabetes.



Continue to maintain good oral intake.  Repeat labs in a.m.  The patient is stable for

discharge from Nephrology standpoint.  She will need follow up as outpatient for CKD.





MMODL / IJN: [id number] / Job#: [id number]

## 2018-10-05 NOTE — P.PN
Subjective


Progress Note Date: 10/05/18





This is an 83-year-old  female patient known to ID service as she was 

seen in the past for right foot ulcer as well as sepsis from urinary tract 

infection in the past.  Patient currently resides at [facility] 

and is wheelchair bound.  She is known to have severe peripheral artery disease 

status post to right lower extremity bypasses by Dr. Tsai, history of 

critical limb ischemia and nonhealing ulcer of right.  Patient was transferred 

from the nursing home to [facility] emergency center due to 

concerns of wounds to the fourth and fifth right toes.  Venous Doppler was 

negative for DVT.  Patient is hard of hearing and is unable to provide any 

history by herself history is obtained by the previous documentation and ER 

documentation.  Temperature max was 101, tachycardia 101, blood pressure 120/60 

saturating well on room air.  Lab obtained suggest leukocytosis of 22.4, 

hemoglobin 10.3 which is close to patient's baseline.  INR obtained suggest 3.1 

and patient is on chronic Coumadin for atrial fibrillation, BNP suggestive 

sodium 132, potassium 5.2, creatinine 2.35 and GFR decreased to 21 from 38 

urinalysis obtained as suggest some leukocytosis 13, with positive nitrates and 

leukocyte esterase.  In the emergency center, patient received vancomycin and 

cefepime.  Kayexalate was given for hyperkalemia.  Patient was admitted to the 

[facility] floor.  She has been seen by Dr. Napier and he does not plan for any 

intervention at this time. If patient's kidney function improve she may benefit 

from angiogram. Patient does have pulses palpable in her lower extremities.  

Perez catheter is to be exchanged today.  She has stage II pressure ulcers of 

the buttocks that were present on admission.  She has been afebrile since 

admission.  White count is down to 15.1.  BUN 98 creatinine 2.44.  She is 

currently on cefepime and vancomycin.  She apparently presented with abdominal 

pain that has resolved.  Hip x-ray showed no fracture or dislocation.  CT of 

the abdomen and pelvis shows atrophic right kidney.  Cardiomegaly.  Coronary 

artery disease.  Hiatal hernia.  Renal ultrasound shows atrophic changes to the 

right kidney as on prior exam.  Cystic focus lower pole left kidney is similar 

to prior exam.  Most recent previous urine culture from December 2017 was 

positive for ESBL E. coli and Enterococcus faecalis.


10/04/2018 patient is having further improvement and will be discharged to the 

extended care facility today.  The patient is denying fevers or chills overall 

feeling better.  Leukocytosis is improving and is afebrile.








Objective





- Vital Signs


Vital signs: 


 Vital Signs











Temp  99.3 F   10/05/18 07:00


 


Pulse  80   10/05/18 07:00


 


Resp  18   10/05/18 07:00


 


BP  144/79   10/05/18 07:00


 


Pulse Ox  99   10/05/18 07:00








 Intake & Output











 10/05/18 10/05/18 10/06/18





 06:59 18:59 06:59


 


Intake Total 450  


 


Output Total 950 700 


 


Balance -500 -700 


 


Intake:   


 


  Oral 450  


 


Output:   


 


  Urine 950 700 


 


Other:   


 


  Voiding Method Indwelling Catheter Indwelling Catheter 














- Exam





Gen: This is an 83-year-old obese  female.  She is resting in bed 

appears to be comfortable and in no acute distress.


HEENT: Head is atraumatic, normocephalic. Pupils equal, round. Sclerae is 

anicteric. 


NECK: Supple. No JVD. No lymphadenopathy. No thyromegaly. 


LUNGS: Clear to auscultation. No wheezes or rhonchi.  No intercostal 

retractions.


HEART: Regular rate and rhythm. No murmur. 


ABDOMEN: Soft. Bowel sounds are present. No masses.  No tenderness.


EXTREMITIES: No pedal edema.  No calf tenderness.  Necrotic tissue at the ends 

of toes 4 and 5 on the right foot.  Foot is warm to the touch.  No drainage 

noted.  Minimal erythema localized only.


NEUROLOGICAL: Patient is awake, alert and oriented to person and place.  

Generalized weakness. 





- Labs


CBC & Chem 7: 


 10/05/18 08:22





 10/05/18 08:22


Labs: 


 Abnormal Lab Results - Last 24 Hours (Table)











  10/05/18 10/05/18 10/05/18 Range/Units





  08:22 08:22 08:22 


 


WBC  11.1 H    (3.8-10.6)  k/uL


 


RBC  3.49 L    (3.80-5.40)  m/uL


 


Hgb  9.6 L    (11.4-16.0)  gm/dL


 


Hct  30.7 L    (34.0-46.0)  %


 


PT   15.3 H   (9.0-12.0)  sec


 


INR   1.7 H   (<1.2)  


 


BUN    63 H  (7-17)  mg/dL


 


Creatinine    1.82 H  (0.52-1.04)  mg/dL


 


Glucose    131 H  (74-99)  mg/dL


 


POC Glucose (mg/dL)     (75-99)  mg/dL














  10/05/18 Range/Units





  11:59 


 


WBC   (3.8-10.6)  k/uL


 


RBC   (3.80-5.40)  m/uL


 


Hgb   (11.4-16.0)  gm/dL


 


Hct   (34.0-46.0)  %


 


PT   (9.0-12.0)  sec


 


INR   (<1.2)  


 


BUN   (7-17)  mg/dL


 


Creatinine   (0.52-1.04)  mg/dL


 


Glucose   (74-99)  mg/dL


 


POC Glucose (mg/dL)  102 H  (75-99)  mg/dL








 Microbiology - Last 24 Hours (Table)











 10/01/18 12:45 Blood Culture - Preliminary





 Blood    No Growth after 96 hours








 Laboratory Results











WBC  11.1 k/uL (3.8-10.6)  H  10/05/18  08:22    


 


RBC  3.49 m/uL (3.80-5.40)  L  10/05/18  08:22    


 


Hgb  9.6 gm/dL (11.4-16.0)  L  10/05/18  08:22    


 


Hct  30.7 % (34.0-46.0)  L  10/05/18  08:22    


 


MCV  88.1 fL (80.0-100.0)   10/05/18  08:22    


 


MCH  27.7 pg (25.0-35.0)   10/05/18  08:22    


 


MCHC  31.4 g/dL (31.0-37.0)   10/05/18  08:22    


 


RDW  13.5 % (11.5-15.5)   10/05/18  08:22    


 


Plt Count  278 k/uL (150-450)   10/05/18  08:22    


 


Neutrophils %  84 %  10/02/18  08:20    


 


Lymphocytes %  8 %  10/02/18  08:20    


 


Monocytes %  4 %  10/02/18  08:20    


 


Eosinophils %  1 %  10/02/18  08:20    


 


Basophils %  1 %  10/02/18  08:20    


 


Neutrophils #  12.7 k/uL (1.3-7.7)  H  10/02/18  08:20    


 


Lymphocytes #  1.3 k/uL (1.0-4.8)   10/02/18  08:20    


 


Monocytes #  0.6 k/uL (0-1.0)   10/02/18  08:20    


 


Eosinophils #  0.2 k/uL (0-0.7)   10/02/18  08:20    


 


Basophils #  0.1 k/uL (0-0.2)   10/02/18  08:20    


 


Hypochromasia  Slight   10/04/18  08:57    


 


ESR  110 mm/hr (0-20)  H  10/01/18  15:27    


 


PT  15.3 sec (9.0-12.0)  H  10/05/18  08:22    


 


INR  1.7  (<1.2)  H  10/05/18  08:22    


 


APTT  39.8 sec (22.0-30.0)  H  10/01/18  12:17    


 


Sodium  140 mmol/L (137-145)   10/05/18  08:22    


 


Potassium  3.9 mmol/L (3.5-5.1)   10/05/18  08:22    


 


Chloride  106 mmol/L ()   10/05/18  08:22    


 


Carbon Dioxide  25 mmol/L (22-30)   10/05/18  08:22    


 


Anion Gap  9 mmol/L  10/05/18  08:22    


 


BUN  63 mg/dL (7-17)  H  10/05/18  08:22    


 


Creatinine  1.82 mg/dL (0.52-1.04)  H  10/05/18  08:22    


 


Est GFR (CKD-EPI)AfAm  29  (>60 ml/min/1.73 sqM)   10/05/18  08:22    


 


Est GFR (CKD-EPI)NonAf  25  (>60 ml/min/1.73 sqM)   10/05/18  08:22    


 


Glucose  131 mg/dL (74-99)  H  10/05/18  08:22    


 


POC Glucose (mg/dL)  102 mg/dL (75-99)  H  10/05/18  11:59    


 


POC Glu Operater Thania Jaquez   10/05/18  11:59    


 


Plasma Lactic Acid Amari  1.4 mmol/L (0.7-2.0)   10/01/18  12:17    


 


Calcium  8.9 mg/dL (8.4-10.2)   10/05/18  08:22    


 


Magnesium  2.1 mg/dL (1.6-2.3)   10/05/18  08:22    


 


Iron  13 ug/dL ()  L  10/02/18  08:20    


 


TIBC  279 ug/dL (228-460)   10/02/18  08:20    


 


Iron Saturation  4.66   (12.00-45.00)  L  10/02/18  08:20    


 


Total Bilirubin  0.6 mg/dL (0.2-1.3)   10/04/18  08:57    


 


AST  25 U/L (14-36)   10/04/18  08:57    


 


ALT  29 U/L (9-52)   10/04/18  08:57    


 


Alkaline Phosphatase  93 U/L ()   10/04/18  08:57    


 


C-Reactive Protein  83.6 mg/L (<10.0)  H  10/01/18  15:27    


 


Total Protein  6.9 g/dL (6.3-8.2)   10/04/18  08:57    


 


Albumin  2.9 g/dL (3.5-5.0)  L  10/04/18  08:57    


 


Urine Color  Light Yellow   10/01/18  12:17    


 


Urine Appearance  Cloudy  (Clear)  H  10/01/18  12:17    


 


Urine pH  7.0  (5.0-8.0)   10/01/18  12:17    


 


Ur Specific Gravity  1.008  (1.001-1.035)   10/01/18  12:17    


 


Urine Protein  Negative  (Negative)   10/01/18  12:17    


 


Urine Glucose (UA)  Negative  (Negative)   10/01/18  12:17    


 


Urine Ketones  Negative  (Negative)   10/01/18  12:17    


 


Urine Blood  Trace  (Negative)  H  10/01/18  12:17    


 


Urine Nitrite  Positive  (Negative)  H  10/01/18  12:17    


 


Urine Bilirubin  Negative  (Negative)   10/01/18  12:17    


 


Urine Urobilinogen  <2.0 mg/dL (<2.0)   10/01/18  12:17    


 


Ur Leukocyte Esterase  Large  (Negative)  H  10/01/18  12:17    


 


Urine RBC  3 /hpf (0-5)   10/01/18  12:17    


 


Urine WBC  13 /hpf (0-5)  H  10/01/18  12:17    


 


Amorphous Sediment  Few /hpf (None)  H  10/01/18  12:17    


 


Urine Bacteria  Occasional /hpf (None)  H  10/01/18  12:17    


 


Urine Mucus  Rare /hpf (None)  H  10/01/18  12:17    


 


Random Vancomycin  10.7 ug/mL  10/05/18  08:22    








 Microbiology





10/01/18 12:45   Blood   Blood Culture - Preliminary


                            No Growth after 96 hours


10/02/18 14:21   Urine,Catheterized   Urine Culture - Final











Assessment and Plan


Assessment: 





83-year-old woman presents to Hospital from the [facility] care facility because 

of changes to her right foot at the distal aspect of the toes as well as 

significant swelling and erythema to the right leg.  She's been evaluated by 

vascular surgery.  Is on evidence of fever and there is concerns of a 

cellulitis to the area.  Zosyn and vancomycin have been started because of her 

prior history of ESBL E. coli and enterococcal infection.  With evidence of the 

dry gangrene, just a dry dressing will be utilized to the toes.  She over does 

have more active ulcerations to her buttocks for which medical honey will be 

applied.  She is evidence of acute renal failure with a creatinine from 2017 

and 1.02.  At presentation there was a severe leukocytosis of 22.4 improving to 

15.1 with concerns for urinary infection as well as the dry gangrenous changes 

to her right leg and a cellulitis.  Cultures are in process will further help 

direct therapy.  


10/05/2018 patient has improved.  Leukocytosis is nearly resolved.  She's 

having no fever or other acute changes.  Explored going back to extended care 

facility today.  Will change antibiotic therapy to oral doxycycline 100 mg 

orally twice per day.  She'll follow up with vascular surgery is indicated for 

the changes to the lower extremity.  Continue with the medical honey dressings 

to the buttock ulcerations.

## 2019-03-12 ENCOUNTER — HOSPITAL ENCOUNTER (INPATIENT)
Dept: HOSPITAL 47 - EC | Age: 84
LOS: 3 days | Discharge: NURSING, REHAB OR LONG-TERM CARE | DRG: 698 | End: 2019-03-15
Attending: FAMILY MEDICINE | Admitting: FAMILY MEDICINE
Payer: MEDICARE

## 2019-03-12 VITALS — BODY MASS INDEX: 35.1 KG/M2

## 2019-03-12 DIAGNOSIS — Z79.01: ICD-10-CM

## 2019-03-12 DIAGNOSIS — N17.0: ICD-10-CM

## 2019-03-12 DIAGNOSIS — Z85.41: ICD-10-CM

## 2019-03-12 DIAGNOSIS — H91.90: ICD-10-CM

## 2019-03-12 DIAGNOSIS — E44.1: ICD-10-CM

## 2019-03-12 DIAGNOSIS — E03.9: ICD-10-CM

## 2019-03-12 DIAGNOSIS — E11.51: ICD-10-CM

## 2019-03-12 DIAGNOSIS — Z99.3: ICD-10-CM

## 2019-03-12 DIAGNOSIS — I12.9: ICD-10-CM
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
## 2019-03-12 DIAGNOSIS — J90: ICD-10-CM

## 2019-03-12 DIAGNOSIS — A41.59: ICD-10-CM

## 2019-03-12 DIAGNOSIS — Z95.828: ICD-10-CM

## 2019-03-12 DIAGNOSIS — E11.21: ICD-10-CM

## 2019-03-12 DIAGNOSIS — Z82.3: ICD-10-CM

## 2019-03-12 DIAGNOSIS — E66.9: ICD-10-CM

## 2019-03-12 DIAGNOSIS — D64.9: ICD-10-CM

## 2019-03-12 DIAGNOSIS — E78.5: ICD-10-CM

## 2019-03-12 DIAGNOSIS — T83.511A: Primary | ICD-10-CM

## 2019-03-12 DIAGNOSIS — Z90.710: ICD-10-CM

## 2019-03-12 DIAGNOSIS — M19.90: ICD-10-CM

## 2019-03-12 DIAGNOSIS — Z74.01: ICD-10-CM

## 2019-03-12 DIAGNOSIS — Z87.440: ICD-10-CM

## 2019-03-12 DIAGNOSIS — R74.0: ICD-10-CM

## 2019-03-12 DIAGNOSIS — Z79.82: ICD-10-CM

## 2019-03-12 DIAGNOSIS — Z82.49: ICD-10-CM

## 2019-03-12 DIAGNOSIS — R97.1: ICD-10-CM

## 2019-03-12 DIAGNOSIS — Z86.14: ICD-10-CM

## 2019-03-12 DIAGNOSIS — E11.22: ICD-10-CM

## 2019-03-12 DIAGNOSIS — E11.42: ICD-10-CM

## 2019-03-12 DIAGNOSIS — E87.5: ICD-10-CM

## 2019-03-12 DIAGNOSIS — Z79.4: ICD-10-CM

## 2019-03-12 DIAGNOSIS — Z79.890: ICD-10-CM

## 2019-03-12 DIAGNOSIS — R32: ICD-10-CM

## 2019-03-12 DIAGNOSIS — E86.0: ICD-10-CM

## 2019-03-12 DIAGNOSIS — Z79.899: ICD-10-CM

## 2019-03-12 DIAGNOSIS — N18.3: ICD-10-CM

## 2019-03-12 DIAGNOSIS — Z98.41: ICD-10-CM

## 2019-03-12 DIAGNOSIS — Z98.42: ICD-10-CM

## 2019-03-12 DIAGNOSIS — N39.0: ICD-10-CM

## 2019-03-12 DIAGNOSIS — E87.6: ICD-10-CM

## 2019-03-12 DIAGNOSIS — I48.0: ICD-10-CM

## 2019-03-12 DIAGNOSIS — I73.9: ICD-10-CM

## 2019-03-12 LAB
ALBUMIN SERPL-MCNC: 3 G/DL (ref 3.5–5)
ALBUMIN SERPL-MCNC: 3 G/DL (ref 3.5–5)
ALP SERPL-CCNC: 143 U/L (ref 38–126)
ALP SERPL-CCNC: 166 U/L (ref 38–126)
ALT SERPL-CCNC: 52 U/L (ref 9–52)
ALT SERPL-CCNC: 56 U/L (ref 9–52)
ANION GAP SERPL CALC-SCNC: 12 MMOL/L
ANION GAP SERPL CALC-SCNC: 12 MMOL/L
APTT BLD: 35.7 SEC (ref 22–30)
AST SERPL-CCNC: 57 U/L (ref 14–36)
AST SERPL-CCNC: 65 U/L (ref 14–36)
BASOPHILS # BLD AUTO: 0 K/UL (ref 0–0.2)
BASOPHILS # BLD AUTO: 0.1 K/UL (ref 0–0.2)
BASOPHILS NFR BLD AUTO: 0 %
BASOPHILS NFR BLD AUTO: 0 %
BUN SERPL-SCNC: 106 MG/DL (ref 7–17)
BUN SERPL-SCNC: 107 MG/DL (ref 7–17)
CALCIUM SPEC-MCNC: 8 MG/DL (ref 8.4–10.2)
CALCIUM SPEC-MCNC: 8.4 MG/DL (ref 8.4–10.2)
CHLORIDE SERPL-SCNC: 101 MMOL/L (ref 98–107)
CHLORIDE SERPL-SCNC: 97 MMOL/L (ref 98–107)
CO2 SERPL-SCNC: 21 MMOL/L (ref 22–30)
CO2 SERPL-SCNC: 22 MMOL/L (ref 22–30)
EOSINOPHIL # BLD AUTO: 0.1 K/UL (ref 0–0.7)
EOSINOPHIL # BLD AUTO: 0.1 K/UL (ref 0–0.7)
EOSINOPHIL NFR BLD AUTO: 0 %
EOSINOPHIL NFR BLD AUTO: 0 %
ERYTHROCYTE [DISTWIDTH] IN BLOOD BY AUTOMATED COUNT: 3.13 M/UL (ref 3.8–5.4)
ERYTHROCYTE [DISTWIDTH] IN BLOOD BY AUTOMATED COUNT: 3.16 M/UL (ref 3.8–5.4)
ERYTHROCYTE [DISTWIDTH] IN BLOOD BY AUTOMATED COUNT: 3.27 M/UL (ref 3.8–5.4)
ERYTHROCYTE [DISTWIDTH] IN BLOOD: 16.6 % (ref 11.5–15.5)
ERYTHROCYTE [DISTWIDTH] IN BLOOD: 16.6 % (ref 11.5–15.5)
ERYTHROCYTE [DISTWIDTH] IN BLOOD: 16.7 % (ref 11.5–15.5)
GLUCOSE BLD-MCNC: 90 MG/DL (ref 75–99)
GLUCOSE SERPL-MCNC: 101 MG/DL (ref 74–99)
GLUCOSE SERPL-MCNC: 86 MG/DL (ref 74–99)
HCT VFR BLD AUTO: 26.1 % (ref 34–46)
HCT VFR BLD AUTO: 26.3 % (ref 34–46)
HCT VFR BLD AUTO: 26.8 % (ref 34–46)
HGB BLD-MCNC: 8.2 GM/DL (ref 11.4–16)
HGB BLD-MCNC: 8.4 GM/DL (ref 11.4–16)
HGB BLD-MCNC: 8.7 GM/DL (ref 11.4–16)
INR PPP: 3.5 (ref ?–1.2)
LYMPHOCYTES # SPEC AUTO: 1 K/UL (ref 1–4.8)
LYMPHOCYTES # SPEC AUTO: 1.1 K/UL (ref 1–4.8)
LYMPHOCYTES NFR SPEC AUTO: 6 %
LYMPHOCYTES NFR SPEC AUTO: 6 %
MAGNESIUM SPEC-SCNC: 2 MG/DL (ref 1.6–2.3)
MCH RBC QN AUTO: 25.7 PG (ref 25–35)
MCH RBC QN AUTO: 26.1 PG (ref 25–35)
MCH RBC QN AUTO: 27.5 PG (ref 25–35)
MCHC RBC AUTO-ENTMCNC: 31.3 G/DL (ref 31–37)
MCHC RBC AUTO-ENTMCNC: 31.4 G/DL (ref 31–37)
MCHC RBC AUTO-ENTMCNC: 33 G/DL (ref 31–37)
MCV RBC AUTO: 82 FL (ref 80–100)
MCV RBC AUTO: 83.2 FL (ref 80–100)
MCV RBC AUTO: 83.4 FL (ref 80–100)
MONOCYTES # BLD AUTO: 1.4 K/UL (ref 0–1)
MONOCYTES # BLD AUTO: 1.6 K/UL (ref 0–1)
MONOCYTES NFR BLD AUTO: 10 %
MONOCYTES NFR BLD AUTO: 9 %
NEUTROPHILS # BLD AUTO: 12.2 K/UL (ref 1.3–7.7)
NEUTROPHILS # BLD AUTO: 14 K/UL (ref 1.3–7.7)
NEUTROPHILS NFR BLD AUTO: 81 %
NEUTROPHILS NFR BLD AUTO: 81 %
PH UR: 6.5 [PH] (ref 5–8)
PLATELET # BLD AUTO: 237 K/UL (ref 150–450)
PLATELET # BLD AUTO: 252 K/UL (ref 150–450)
PLATELET # BLD AUTO: 259 K/UL (ref 150–450)
POTASSIUM SERPL-SCNC: 3.2 MMOL/L (ref 3.5–5.1)
POTASSIUM SERPL-SCNC: 3.2 MMOL/L (ref 3.5–5.1)
PROT SERPL-MCNC: 6.8 G/DL (ref 6.3–8.2)
PROT SERPL-MCNC: 6.8 G/DL (ref 6.3–8.2)
PROT UR QL: (no result)
PT BLD: 33.3 SEC (ref 9–12)
RBC UR QL: 134 /HPF (ref 0–5)
SODIUM SERPL-SCNC: 131 MMOL/L (ref 137–145)
SODIUM SERPL-SCNC: 134 MMOL/L (ref 137–145)
SP GR UR: 1.01 (ref 1–1.03)
SQUAMOUS UR QL AUTO: 1 /HPF (ref 0–4)
UROBILINOGEN UR QL STRIP: <2 MG/DL (ref ?–2)
WBC # BLD AUTO: 15.2 K/UL (ref 3.8–10.6)
WBC # BLD AUTO: 16.8 K/UL (ref 3.8–10.6)
WBC # BLD AUTO: 17.3 K/UL (ref 3.8–10.6)
WBC #/AREA URNS HPF: >182 /HPF (ref 0–5)

## 2019-03-12 PROCEDURE — 80053 COMPREHEN METABOLIC PANEL: CPT

## 2019-03-12 PROCEDURE — 85025 COMPLETE CBC W/AUTO DIFF WBC: CPT

## 2019-03-12 PROCEDURE — 74176 CT ABD & PELVIS W/O CONTRAST: CPT

## 2019-03-12 PROCEDURE — 83550 IRON BINDING TEST: CPT

## 2019-03-12 PROCEDURE — 83540 ASSAY OF IRON: CPT

## 2019-03-12 PROCEDURE — 96361 HYDRATE IV INFUSION ADD-ON: CPT

## 2019-03-12 PROCEDURE — 96374 THER/PROPH/DIAG INJ IV PUSH: CPT

## 2019-03-12 PROCEDURE — 87186 SC STD MICRODIL/AGAR DIL: CPT

## 2019-03-12 PROCEDURE — 87086 URINE CULTURE/COLONY COUNT: CPT

## 2019-03-12 PROCEDURE — 76937 US GUIDE VASCULAR ACCESS: CPT

## 2019-03-12 PROCEDURE — 82550 ASSAY OF CK (CPK): CPT

## 2019-03-12 PROCEDURE — 96375 TX/PRO/DX INJ NEW DRUG ADDON: CPT

## 2019-03-12 PROCEDURE — 85610 PROTHROMBIN TIME: CPT

## 2019-03-12 PROCEDURE — 83735 ASSAY OF MAGNESIUM: CPT

## 2019-03-12 PROCEDURE — 87040 BLOOD CULTURE FOR BACTERIA: CPT

## 2019-03-12 PROCEDURE — 80048 BASIC METABOLIC PNL TOTAL CA: CPT

## 2019-03-12 PROCEDURE — 76856 US EXAM PELVIC COMPLETE: CPT

## 2019-03-12 PROCEDURE — 71046 X-RAY EXAM CHEST 2 VIEWS: CPT

## 2019-03-12 PROCEDURE — 99285 EMERGENCY DEPT VISIT HI MDM: CPT

## 2019-03-12 PROCEDURE — 86304 IMMUNOASSAY TUMOR CA 125: CPT

## 2019-03-12 PROCEDURE — 93975 VASCULAR STUDY: CPT

## 2019-03-12 PROCEDURE — 85027 COMPLETE CBC AUTOMATED: CPT

## 2019-03-12 PROCEDURE — 87077 CULTURE AEROBIC IDENTIFY: CPT

## 2019-03-12 PROCEDURE — 85730 THROMBOPLASTIN TIME PARTIAL: CPT

## 2019-03-12 PROCEDURE — 36410 VNPNXR 3YR/> PHY/QHP DX/THER: CPT

## 2019-03-12 PROCEDURE — 51702 INSERT TEMP BLADDER CATH: CPT

## 2019-03-12 PROCEDURE — 83605 ASSAY OF LACTIC ACID: CPT

## 2019-03-12 PROCEDURE — 81001 URINALYSIS AUTO W/SCOPE: CPT

## 2019-03-12 PROCEDURE — 36415 COLL VENOUS BLD VENIPUNCTURE: CPT

## 2019-03-12 PROCEDURE — 87502 INFLUENZA DNA AMP PROBE: CPT

## 2019-03-12 RX ADMIN — POTASSIUM CHLORIDE SCH MLS/HR: 7.46 INJECTION, SOLUTION INTRAVENOUS at 22:58

## 2019-03-12 RX ADMIN — INSULIN DETEMIR SCH: 100 INJECTION, SOLUTION SUBCUTANEOUS at 21:51

## 2019-03-12 RX ADMIN — METOPROLOL TARTRATE SCH MG: 50 TABLET, FILM COATED ORAL at 21:49

## 2019-03-12 RX ADMIN — POTASSIUM CHLORIDE SCH MLS/HR: 7.46 INJECTION, SOLUTION INTRAVENOUS at 20:08

## 2019-03-12 RX ADMIN — POTASSIUM CHLORIDE SCH MLS/HR: 7.46 INJECTION, SOLUTION INTRAVENOUS at 21:49

## 2019-03-12 NOTE — CT
EXAMINATION TYPE: CT abdomen pelvis wo con

 

DATE OF EXAM: 3/12/2019

 

COMPARISON: 10/1/2018

 

HISTORY: Abnormal renal function.

 

CT DLP: 866 mGycm

Automated exposure control for dose reduction was used.

 

TECHNIQUE:  Helical acquisition of images was performed from the lung bases through the pelvis.

 

FINDINGS: 

 

LUNG BASES: There is cardiomegaly with right lower lobe and left lower lobe consolidation and pleural
 effusion.

 

LIVER/GB: No significant abnormality is appreciated.

 

PANCREAS: Localized focal prominence of the junction of the pancreatic body and tail on axial image 3
6 is indeterminate by noncontrast CT..

 

SPLEEN: No significant abnormality is seen.

 

ADRENALS: No significant abnormality is seen.

 

KIDNEYS: Right kidney is markedly atrophic. There is a hypodense lesion extending off the left kidney
 which is stable from prior exam measures 15 Hounsfield units. Perinephric edema noted bilaterally. B
ladder wall is thickened but there is a Perez catheter which may account for the finding. Suggestion 
of a hyperdense 1 cm lesion involving the upper pole the left kidney

 

 

ADENOPATHY:  The mesentery adjacent to left aorta there are multiple rounded densities measuring a sh
ort axis of 1.4 cm compatible with adenopathy

 

OSSEOUS STRUCTURES:  Hypertrophic and degenerative change of the spine with facet arthropathy.

 

BOWEL:  Retained fecal debris throughout the rectum. Bowel gas pattern nonspecific. Appendix normal.

 

OTHER: Atherosclerotic change of the vasculature. Small amount of free fluid in the pelvis.

 

IMPRESSION:

1. There is marked atrophy of the right kidney correlate for chronic medical renal disease.

2. Stable indeterminate left renal lesion measuring 15Hounsfield units likely related to renal cyst. 
Perinephric edema noted bilaterally correlate with renal function study in urinalysis. Additionally, 
there is a 1 cm hyperdense lesion involving the upper pole the left kidney which could be correlated 
with ultrasound.

3. There is nonspecific pathologic adenopathy within the mesentery left periaortic region.

4. Small amount of free fluid in the pelvis. Appears somewhat hyperdense which may represent a hemorr
hagic component. Would recommend pelvic ultrasound to exclude adnexal mass.

5. Cardiomegaly with coronary artery calcification bilateral consolidation and small pleural effusion
s.

6. There is a localized focal prominence of the pancreatic body on axial image 36. This is difficult 
to assess without contrast. Recommend follow-up MRI.

## 2019-03-12 NOTE — ED
General Adult HPI





- General


Stated complaint: abnormal labs


Time Seen by Provider: 19 12:35


Source: patient, EMS, RN notes reviewed, old records reviewed





- History of Present Illness


Initial comments: 





84-year-old female presents from nursing home with abnormal outpatient 

laboratory studies.  Patient has no history of chronic kidney disease, was noted

to have an elevated BUN and creatinine.  Patient reports increased generalized 

weakness and not feeling well over the past week.  Denies pain complaints.  

Denies nausea vomiting or diarrhea.  She states she has not had an appetite and 

has had very little to eat or drink over the past one week.  She does have 

indwelling Perez catheter, she is primarily bedbound and wheelchair-bound.  

Denies chest pain or dyspnea.  Denies fever or chills.





- Related Data


                                Home Medications











 Medication  Instructions  Recorded  Confirmed


 


Aspirin 81 mg PO HS 16


 


Levothyroxine Sodium [Synthroid] 50 mcg PO QAM 16


 


Pravastatin Sodium [Pravachol] 80 mg PO HS 16


 


Insulin Glargine [Lantus] 26 unit SQ HS 17


 


INSULIN LISPRO (humaLOG) [humaLOG] 10 units SQ AC-TID 17


 


Sennosides-Docusate Sodium 1 tab PO DAILY 17





[Senokot-S]   


 


Multivitamins, Thera [Multivitamin 1 tab PO DAILY 10/01/18 03/12/19





(formulary)]   


 


Potassium Chloride ER [K-Dur 10] 10 meq PO DAILY 10/01/18 03/12/19


 


Sodium Chloride 5% Ophth Soln 1 drop BOTH EYES BID 10/01/18 03/12/19





[Vahid 128]   


 


Amino Acids/Protein Hydrolys 30 ml PO DAILY 18





[Pro-Stat Supplement]   


 


amLODIPine BESYLATE 5 mg PO DAILY 18


 


Spironolactone [Aldactone] 12.5 mg PO DAILY 18


 


Cranberry 450mg 1 tab PO DAILY 19


 


Losartan Potassium 100 mg PO DAILY 19


 


Metoprolol Tartrate [Lopressor] 100 mg PO BID 19


 


Warfarin [Coumadin] 2.5 mg PO HS 19


 


hydrALAZINE HCL [Apresoline] 100 mg PO TID 19


 


sitaGLIPtin PHOSPHATE [Januvia] 50 mg PO HS 19








                                  Previous Rx's











 Medication  Instructions  Recorded


 


Furosemide [Lasix] 40 mg PO DAILY #0 10/05/18











                                    Allergies











Allergy/AdvReac Type Severity Reaction Status Date / Time


 


No Known Allergies Allergy   Verified 19 13:16














Review of Systems


ROS Statement: 


Those systems with pertinent positive or pertinent negative responses have been 

documented in the HPI.





ROS Other: All systems not noted in ROS Statement are negative.





Past Medical History


Past Medical History: Atrial Fibrillation, Diabetes Mellitus, Hyperlipidemia, 

Hypertension, Thyroid Disorder


Additional Past Medical History / Comment(s): peripheral neuropathy, UTIs, 

urinary incontinence, OA bilateral hands and back, PVD, cervical cancer


History of Any Multi-Drug Resistant Organisms: MRSA


Date of last positivie culture/infection: 2016


MDRO Source:: RIGHT FOOT


Past Surgical History: Hysterectomy, Tonsillectomy


Additional Past Surgical History / Comment(s): R leg bypass surgery, bilateral 

cataract removal


Past Anesthesia/Blood Transfusion Reactions: No Reported Reaction


Past Psychological History: No Psychological Hx Reported


Additional Psychological History / Comment(s): Pt states she lives at [facility].  States uses a wheelchair to get around.


Smoking Status: Never smoker


Past Alcohol Use History: None Reported


Additional Past Alcohol Use History / Comment(s): Patient resides at [facility] and is wheelchair bound.


Past Drug Use History: None Reported





- Past Family History


  ** Mother


Family Medical History: CVA/TIA


Additional Family Medical History / Comment(s): Mother  in her 70's.





  ** Father


Family Medical History: Hypertension


Additional Family Medical History / Comment(s): Father  in his 70's.





General Exam


General appearance: alert, in no apparent distress


Head exam: Present: atraumatic, normocephalic


Eye exam: Present: normal appearance, PERRL


ENT exam: Present: mucous membranes dry


Neck exam: Present: normal inspection.  Absent: tenderness, meningismus


Respiratory exam: Present: normal lung sounds bilaterally.  Absent: respiratory 

distress, wheezes


Cardiovascular Exam: Present: regular rate, irregular rhythm


GI/Abdominal exam: Present: soft.  Absent: distended, tenderness, guarding, 

rebound, rigid


Extremities exam: Present: normal capillary refill, other (Right lower 

extremity, greater circumference than left no tenderness.)


Neurological exam: Present: alert, oriented X3.  Absent: motor sensory deficit


Psychiatric exam: Present: normal affect, normal mood


Skin exam: Present: warm, dry, erythema.  Absent: cyanosis, diaphoretic





Course


                                   Vital Signs











  19





  12:37 13:00 13:30


 


Temperature 99.1 F  


 


Pulse Rate 96 98 89


 


Respiratory 18 16 16





Rate   


 


Blood Pressure 122/54 122/54 124/59


 


O2 Sat by Pulse 100 96 98





Oximetry   














  19





  14:00 14:06 14:30


 


Temperature  98.8 F 


 


Pulse Rate 90 94 93


 


Respiratory 16 18 16





Rate   


 


Blood Pressure 125/62 134/64 134/64


 


O2 Sat by Pulse 99 99 100





Oximetry   














  19





  16:00


 


Temperature 


 


Pulse Rate 85


 


Respiratory 18





Rate 


 


Blood Pressure 138/66


 


O2 Sat by Pulse 98





Oximetry 














EKG Findings





- EKG Comments:


EKG Findings:: EKG: Atrial fibrillation, no ST segment elevation, rate of 94, 

QRS duration 92, 





Medical Decision Making





- Medical Decision Making





84-year-old female presents with abnormal outpatient labs.  Patient found to be 

in acute renal failure.  Laboratory studies are repeated in the emergency 

department, patient does have significantly elevated BUN and creatinine.  

Patient herself has no complaints, family had been sent with the patient complai

dilcia of some lower abdominal pain.  Perez catheter is exchanged in the emergency

 department.  Urinalysis does show signs of urinary tract infection, given the 

elevated white blood cell count of 15.2 patient is treated for UTI.  She has 

mild electrolyte abnormalities with sodium 131 potassium 3.2 this is replaced.  

Hemoglobin is 8.4 which is stable from earlier at 8.3.  CT the abdomen shows 

renal atrophy, does show concern for pelvic fluid which may be hemorrhagic.  

Patient is on Coumadin with INR 3.5.  She is given vitamin K, repeat hemoglobin 

will be obtained, pelvic ultrasound will be obtained to evaluate for hemorrhage.








Case discussed with Dr. Valdez, who will admit.





- Lab Data


Result diagrams: 


                                 19 12:54





                                 19 12:54


                                   Lab Results











  19 Range/Units





  12:54 12:54 12:54 


 


WBC  15.2 H    (3.8-10.6)  k/uL


 


RBC  3.27 L    (3.80-5.40)  m/uL


 


Hgb  8.4 L    (11.4-16.0)  gm/dL


 


Hct  26.8 L    (34.0-46.0)  %


 


MCV  82.0    (80.0-100.0)  fL


 


MCH  25.7    (25.0-35.0)  pg


 


MCHC  31.3    (31.0-37.0)  g/dL


 


RDW  16.6 H    (11.5-15.5)  %


 


Plt Count  259    (150-450)  k/uL


 


Neutrophils %  81    %


 


Lymphocytes %  6    %


 


Monocytes %  10    %


 


Eosinophils %  0    %


 


Basophils %  0    %


 


Neutrophils #  12.2 H    (1.3-7.7)  k/uL


 


Lymphocytes #  1.0    (1.0-4.8)  k/uL


 


Monocytes #  1.4 H    (0-1.0)  k/uL


 


Eosinophils #  0.1    (0-0.7)  k/uL


 


Basophils #  0.0    (0-0.2)  k/uL


 


Anisocytosis  Slight    


 


PT     (9.0-12.0)  sec


 


INR     (<1.2)  


 


APTT     (22.0-30.0)  sec


 


Sodium   131 L   (137-145)  mmol/L


 


Potassium   3.2 L   (3.5-5.1)  mmol/L


 


Chloride   97 L   ()  mmol/L


 


Carbon Dioxide   22   (22-30)  mmol/L


 


Anion Gap   12   mmol/L


 


BUN   107 H*   (7-17)  mg/dL


 


Creatinine   3.18 H   (0.52-1.04)  mg/dL


 


Est GFR (CKD-EPI)AfAm   15   (>60 ml/min/1.73 sqM)  


 


Est GFR (CKD-EPI)NonAf   13   (>60 ml/min/1.73 sqM)  


 


Glucose   101 H   (74-99)  mg/dL


 


Plasma Lactic Acid Amari    0.7  (0.7-2.0)  mmol/L


 


Calcium   8.4   (8.4-10.2)  mg/dL


 


Magnesium   2.0   (1.6-2.3)  mg/dL


 


Total Bilirubin   0.9   (0.2-1.3)  mg/dL


 


AST   57 H   (14-36)  U/L


 


ALT   52   (9-52)  U/L


 


Alkaline Phosphatase   143 H   ()  U/L


 


Total Protein   6.8   (6.3-8.2)  g/dL


 


Albumin   3.0 L   (3.5-5.0)  g/dL


 


Urine Color     


 


Urine Appearance     (Clear)  


 


Urine pH     (5.0-8.0)  


 


Ur Specific Gravity     (1.001-1.035)  


 


Urine Protein     (Negative)  


 


Urine Glucose (UA)     (Negative)  


 


Urine Ketones     (Negative)  


 


Urine Blood     (Negative)  


 


Urine Nitrite     (Negative)  


 


Urine Bilirubin     (Negative)  


 


Urine Urobilinogen     (<2.0)  mg/dL


 


Ur Leukocyte Esterase     (Negative)  


 


Urine RBC     (0-5)  /hpf


 


Urine WBC     (0-5)  /hpf


 


Urine WBC Clumps     (None)  /hpf


 


Ur Squamous Epith Cells     (0-4)  /hpf


 


Urine Bacteria     (None)  /hpf


 


Urine Mucus     (None)  /hpf


 


Influenza Type A RNA     (Not Detectd)  


 


Influenza Type B (PCR)     (Not Detectd)  














  19 Range/Units





  12:54 12:54 14:45 


 


WBC     (3.8-10.6)  k/uL


 


RBC     (3.80-5.40)  m/uL


 


Hgb     (11.4-16.0)  gm/dL


 


Hct     (34.0-46.0)  %


 


MCV     (80.0-100.0)  fL


 


MCH     (25.0-35.0)  pg


 


MCHC     (31.0-37.0)  g/dL


 


RDW     (11.5-15.5)  %


 


Plt Count     (150-450)  k/uL


 


Neutrophils %     %


 


Lymphocytes %     %


 


Monocytes %     %


 


Eosinophils %     %


 


Basophils %     %


 


Neutrophils #     (1.3-7.7)  k/uL


 


Lymphocytes #     (1.0-4.8)  k/uL


 


Monocytes #     (0-1.0)  k/uL


 


Eosinophils #     (0-0.7)  k/uL


 


Basophils #     (0-0.2)  k/uL


 


Anisocytosis     


 


PT  33.3 H    (9.0-12.0)  sec


 


INR  3.5 H    (<1.2)  


 


APTT  35.7 H    (22.0-30.0)  sec


 


Sodium     (137-145)  mmol/L


 


Potassium     (3.5-5.1)  mmol/L


 


Chloride     ()  mmol/L


 


Carbon Dioxide     (22-30)  mmol/L


 


Anion Gap     mmol/L


 


BUN     (7-17)  mg/dL


 


Creatinine     (0.52-1.04)  mg/dL


 


Est GFR (CKD-EPI)AfAm     (>60 ml/min/1.73 sqM)  


 


Est GFR (CKD-EPI)NonAf     (>60 ml/min/1.73 sqM)  


 


Glucose     (74-99)  mg/dL


 


Plasma Lactic Acid Amari     (0.7-2.0)  mmol/L


 


Calcium     (8.4-10.2)  mg/dL


 


Magnesium     (1.6-2.3)  mg/dL


 


Total Bilirubin     (0.2-1.3)  mg/dL


 


AST     (14-36)  U/L


 


ALT     (9-52)  U/L


 


Alkaline Phosphatase     ()  U/L


 


Total Protein     (6.3-8.2)  g/dL


 


Albumin     (3.5-5.0)  g/dL


 


Urine Color    Yellow  


 


Urine Appearance    Cloudy H  (Clear)  


 


Urine pH    6.5  (5.0-8.0)  


 


Ur Specific Gravity    1.010  (1.001-1.035)  


 


Urine Protein    1+ H  (Negative)  


 


Urine Glucose (UA)    Negative  (Negative)  


 


Urine Ketones    Negative  (Negative)  


 


Urine Blood    Moderate H  (Negative)  


 


Urine Nitrite    Positive H  (Negative)  


 


Urine Bilirubin    Negative  (Negative)  


 


Urine Urobilinogen    <2.0  (<2.0)  mg/dL


 


Ur Leukocyte Esterase    Large H  (Negative)  


 


Urine RBC    134 H  (0-5)  /hpf


 


Urine WBC    >182 H  (0-5)  /hpf


 


Urine WBC Clumps    Many H  (None)  /hpf


 


Ur Squamous Epith Cells    1  (0-4)  /hpf


 


Urine Bacteria    Many H  (None)  /hpf


 


Urine Mucus    Rare H  (None)  /hpf


 


Influenza Type A RNA   Not Detected   (Not Detectd)  


 


Influenza Type B (PCR)   Not Detected   (Not Detectd)  














Disposition


Clinical Impression: 


 Hypokalemia, Acute renal failure, UTI (urinary tract infection)





Disposition: ADMITTED AS IP TO THIS HOSP


Condition: Stable


Is patient prescribed a controlled substance at d/c from ED?: No


Referrals: 


Azalea Pardo MD [Primary Care Provider] - 1-2 days


Decision to Admit Reason: Admit from EC


Decision Date: 19


Decision Time: 16:33

## 2019-03-12 NOTE — P.HPIM
History of Present Illness


H&P Date: 19


Chief Complaint: Mental status changes weakness, abnormal renal function test





This is an 84-year-old pleasant female followed by Dr. Pardo in [facility]  

known history off diabetes mellitus type 2 requiring insulin, peripheral 

neuropathy hypertension, hypothyroidism atrial fibrillation, admitted to 

[facility] via EMS transfer secondary to increasing d

ehydration.  Patient has had diminished appetite, over the past 4-5 days, no 

diarrhea, patient is on diuretics, for chronic dependent edema, heat she has had

increasing fatigue, weakness, patient cannot specify dysuria, however his 

recurrent low back pain, and has urinary incontinence occasional chills and 

myalgia. has nausea, bruises, chronic edema right leg more than left, chronic 

retana x 2 years





In the emergency room creatinine was 3.18,  she was subsequently admitted for 

acute kidney failure, and azotemia electrolyte abnormalities, as well as  with 

acute urinary tract infection  urinalysis leukocyte positive nitrate positive 

with WBC clumping computed tomography scan emergency room, right kidney is 

markedly atrophic, hypodense lesion extending off the left kidney stable from 

previous, perinephric edema noted, bladder was taken, 1 cm lesion upper pole 

left kidney no hydronephrosis noted, lung bases shows cardiomegaly with right 

lower lobe left lower lobe consolidation pleural effusion there is small amount 

of free fluid in the pelvis, hypodense could represent hemorrhagic component, 

they have recommended pelvic ultrasound to exclude adrenal adnexal mass also 

localized focal prominence of pancreatic body however this is difficult to 

address to have recommended MRI





Review of Systems


Constitutional: Reports anorexia


Ears, nose, mouth and throat: Reports as per HPI, Denies ant. neck pain, Denies 

bleeding gums, Denies dental pain, Denies dysphagia, Denies epistaxis, Denies 

headache, Denies hoarseness, Denies mouth pain, Denies nasal congestion, Denies 

nasal discharge, Denies neck fullness/pressure, Denies neck lump, Denies nose 

pain, Denies odynophagia, Denies post-nasal drip, Denies sinus pain, Denies sinu

s pressure, Denies swelling in mouth, Denies swelling in throat, Denies sore 

throat, Denies vertigo, Denies voice changes


Cardiovascular: Reports as per HPI, Reports lightheadedness


Respiratory: Reports as per HPI


Gastrointestinal: Reports as per HPI, Reports abdominal pain, Reports bloating, 

Reports change in bowel habits, Reports early satiety, Reports excessive gas


Genitourinary: Reports as per HPI, Denies abnormal vaginal bleeding, Denies 

decreased libido, Denies difficulty conceiving, Denies difficulty voiding, 

Denies dysmenorrhea, Denies dyspareunia, Denies dysuria, Denies flank pain, De

nies genital sores, Denies hematuria, Denies hot flashes, Denies incomplete 

emptying, Denies kidney stones, Denies menorrhagia, Denies mixed incontinence, 

Denies nocturia, Denies pelvic pain, Denies post void dribbling, Denies 

pregnant, Denies prolapse symptoms, Denies stress incontinence, Denies urge 

incontinence, Denies urgency, Denies urinary frequency, Denies vaginal 

discharge, Denies vaginal dryness, Denies vaginal itching, Denies vaginal odor


Menstruation: Reports as per HPI


Musculoskeletal: Reports as per HPI, Reports gait dysfunction, Reports 

limitation of motion


Integumentary: Reports as per HPI, Denies acne, Denies boils, Denies brittle 

nails, Denies change in hair/nails, Denies color changes, Denies darkening of 

skin, Denies depigmentation, Denies dryness, Denies foot/leg ulcers, Denies 

growths, Denies hirsutism, Denies lesions, Denies onychomycosis, Denies 

pruritus, Denies rash, Denies sores, Denies striae, Denies unusual bruising, 

Denies wounds


Neurological: Reports as per HPI, Reports loss of vision, Reports weakness


Psychiatric: Reports as per HPI, Denies anhedonia, Denies anxiety, Denies 

anxiety attacks, Denies change in appetite, Denies change in libido, Denies 

change in sleep habits, Denies confusion, Denies depression, Denies difficulty 

concentrating, Denies disorientation, Denies hallucinations, Denies 

hopelessness, Denies hypersomnia, Denies insomnia, Denies irritability, Denies 

memory loss, Denies mood swings, Denies paranoia, Denies sadness/tearfulness, 

Denies sleep disturbances, Denies suicidal ideation


Endocrine: Reports as per HPI, Denies cold intolerance, Denies deepening of the 

voice, Denies excessive sweating, Denies excessive thirst, Denies fatigue, 

Denies flushing, Denies heat intolerance, Denies high blood sugars, Denies 

increase in ring/shoe/hat size, Denies low blood sugars, Denies nocturia, Denies

palpitations, Denies polydipsia, Denies polyphagia, Denies polyuria, Denies 

proptosis, Denies recent glucocorticoid use, Denies thyroid mass, Denies weight 

change


Hematologic/Lymphatic: Reports as per HPI


Allergic/Immunologic: Reports as per HPI, Denies allergic rhinitis, Denies 

anaphylaxis, Denies angioedema, Denies gluten intolerance, Denies persistent 

infections, Denies seasonal allergies, Denies urticaria, Denies wheezing





Past Medical History


Past Medical History: Atrial Fibrillation, Diabetes Mellitus, Hyperlipidemia, 

Hypertension, Thyroid Disorder


Additional Past Medical History / Comment(s): peripheral neuropathy, UTIs, 

urinary incontinence, OA bilateral hands and back, PVD, cervical cancer


History of Any Multi-Drug Resistant Organisms: MRSA


Date of last positivie culture/infection: 2016


MDRO Source:: RIGHT FOOT


Past Surgical History: Hysterectomy, Tonsillectomy


Additional Past Surgical History / Comment(s): R leg bypass surgery, bilateral 

cataract removal


Past Anesthesia/Blood Transfusion Reactions: No Reported Reaction


Past Psychological History: No Psychological Hx Reported


Additional Psychological History / Comment(s): Pt states she lives at [facility].  States uses a wheelchair to get around.


Smoking Status: Never smoker


Past Alcohol Use History: None Reported


Additional Past Alcohol Use History / Comment(s): Patient resides at [facility] and is wheelchair bound.


Past Drug Use History: None Reported





- Past Family History


  ** Mother


Family Medical History: CVA/TIA


Additional Family Medical History / Comment(s): Mother  in her 70's.





  ** Father


Family Medical History: Hypertension


Additional Family Medical History / Comment(s): Father  in his 70's.





Medications and Allergies


                                Home Medications











 Medication  Instructions  Recorded  Confirmed  Type


 


Aspirin 81 mg PO HS 16 History


 


Levothyroxine Sodium [Synthroid] 50 mcg PO QAM 16 History


 


Pravastatin Sodium [Pravachol] 80 mg PO HS 16 History


 


Insulin Glargine [Lantus] 26 unit SQ HS 17 History


 


INSULIN LISPRO (humaLOG) [humaLOG] 10 units SQ AC-TID 17 History


 


Sennosides-Docusate Sodium 1 tab PO DAILY 17 History





[Senokot-S]    


 


Multivitamins, Thera [Multivitamin 1 tab PO DAILY 10/01/18 03/12/19 History





(formulary)]    


 


Potassium Chloride ER [K-Dur 10] 10 meq PO DAILY 10/01/18 03/12/19 History


 


Sodium Chloride 5% Ophth Soln 1 drop BOTH EYES BID 10/01/18 03/12/19 History





[Vahid 128]    


 


Furosemide [Lasix] 40 mg PO DAILY #0 10/05/18 03/12/19 Rx


 


Amino Acids/Protein Hydrolys 30 ml PO DAILY 18 History





[Pro-Stat Supplement]    


 


amLODIPine BESYLATE 5 mg PO DAILY 18 History


 


Spironolactone [Aldactone] 12.5 mg PO DAILY 18 History


 


Cranberry 450mg 1 tab PO DAILY 19 History


 


Losartan Potassium 100 mg PO DAILY 19 History


 


Metoprolol Tartrate [Lopressor] 100 mg PO BID 19 History


 


Warfarin [Coumadin] 2.5 mg PO HS 19 History


 


hydrALAZINE HCL [Apresoline] 100 mg PO TID 19 History


 


sitaGLIPtin PHOSPHATE [Januvia] 50 mg PO HS 19 History








                                    Allergies











Allergy/AdvReac Type Severity Reaction Status Date / Time


 


No Known Allergies Allergy   Verified 19 13:16














Physical Exam


Vitals: 


                                   Vital Signs











  Temp Pulse Resp BP Pulse Ox


 


 19 16:31   78  16  127/67  97


 


 19 16:00   85  18  138/66  98


 


 19 14:30   93  16  134/64  100


 


 19 14:06  98.8 F  94  18  134/64  99


 


 19 14:00   90  16  125/62  99


 


 19 13:30   89  16  124/59  98


 


 19 13:00   98  16  122/54  96


 


 19 12:37  99.1 F  96  18  122/54  100








                                Intake and Output











 19





 06:59 14:59 22:59


 


Other:   


 


  Weight  92.76 kg 














- Constitutional


General appearance: cooperative, no acute distress





- EENT


Eyes: anicteric sclerae, EOMI, PERRLA, photophobia


ENT: NA/AT, normal oropharynx





- Neck


Neck: normal ROM





- Respiratory


Respiratory: bilateral: CTA, negative: diminished, dullness, rales





- Cardiovascular


Rhythm: regular


Heart sounds: normal: S1, S2


Abnormal Heart Sounds: no systolic murmur, no diastolic murmur, no rub, no S3 

Gallop, no S4 Gallop, no click, no other





- Gastrointestinal


General gastrointestinal: normal bowel sounds, soft





- Integumentary


Integumentary: decreased turgor, normal





- Neurologic


Neurologic: CNII-XII intact





- Musculoskeletal


Musculoskeletal: gait normal, strength equal bilaterally





- Psychiatric


Psychiatric: appropriate affect, intact judgment & insight





Results


CBC & Chem 7: 


                                 19 06:44





                                 19 06:44


Labs: 


                  Abnormal Lab Results - Last 24 Hours (Table)











  19 Range/Units





  12:54 12:54 12:54 


 


WBC  15.2 H    (3.8-10.6)  k/uL


 


RBC  3.27 L    (3.80-5.40)  m/uL


 


Hgb  8.4 L    (11.4-16.0)  gm/dL


 


Hct  26.8 L    (34.0-46.0)  %


 


RDW  16.6 H    (11.5-15.5)  %


 


Neutrophils #  12.2 H    (1.3-7.7)  k/uL


 


Monocytes #  1.4 H    (0-1.0)  k/uL


 


PT    33.3 H  (9.0-12.0)  sec


 


INR    3.5 H  (<1.2)  


 


APTT    35.7 H  (22.0-30.0)  sec


 


Sodium   131 L   (137-145)  mmol/L


 


Potassium   3.2 L   (3.5-5.1)  mmol/L


 


Chloride   97 L   ()  mmol/L


 


BUN   107 H*   (7-17)  mg/dL


 


Creatinine   3.18 H   (0.52-1.04)  mg/dL


 


Glucose   101 H   (74-99)  mg/dL


 


AST   57 H   (14-36)  U/L


 


Alkaline Phosphatase   143 H   ()  U/L


 


Albumin   3.0 L   (3.5-5.0)  g/dL


 


Urine Appearance     (Clear)  


 


Urine Protein     (Negative)  


 


Urine Blood     (Negative)  


 


Urine Nitrite     (Negative)  


 


Ur Leukocyte Esterase     (Negative)  


 


Urine RBC     (0-5)  /hpf


 


Urine WBC     (0-5)  /hpf


 


Urine WBC Clumps     (None)  /hpf


 


Urine Bacteria     (None)  /hpf


 


Urine Mucus     (None)  /hpf














  19 Range/Units





  14:45 


 


WBC   (3.8-10.6)  k/uL


 


RBC   (3.80-5.40)  m/uL


 


Hgb   (11.4-16.0)  gm/dL


 


Hct   (34.0-46.0)  %


 


RDW   (11.5-15.5)  %


 


Neutrophils #   (1.3-7.7)  k/uL


 


Monocytes #   (0-1.0)  k/uL


 


PT   (9.0-12.0)  sec


 


INR   (<1.2)  


 


APTT   (22.0-30.0)  sec


 


Sodium   (137-145)  mmol/L


 


Potassium   (3.5-5.1)  mmol/L


 


Chloride   ()  mmol/L


 


BUN   (7-17)  mg/dL


 


Creatinine   (0.52-1.04)  mg/dL


 


Glucose   (74-99)  mg/dL


 


AST   (14-36)  U/L


 


Alkaline Phosphatase   ()  U/L


 


Albumin   (3.5-5.0)  g/dL


 


Urine Appearance  Cloudy H  (Clear)  


 


Urine Protein  1+ H  (Negative)  


 


Urine Blood  Moderate H  (Negative)  


 


Urine Nitrite  Positive H  (Negative)  


 


Ur Leukocyte Esterase  Large H  (Negative)  


 


Urine RBC  134 H  (0-5)  /hpf


 


Urine WBC  >182 H  (0-5)  /hpf


 


Urine WBC Clumps  Many H  (None)  /hpf


 


Urine Bacteria  Many H  (None)  /hpf


 


Urine Mucus  Rare H  (None)  /hpf














Assessment and Plan


Plan: 








 1 sepsis secondary to  acute urinary tract infection, chronic indwelling retana,

  influenza test negative, IV Rocephin renal dosing, cultures has been sent, 

renal ultrasound to evaluate for nephrolithiasis or struvite stones





2 Acute kidney failure with azotemia from ATN secondary to dehydration, 

underlying history CK D stage III   last creatinine 1.2018, and 3 

creatinine 3.17, CPK will be done to evaluate for rhabdomyolysis, and postvoid 

residual as well as renal ultrasound, consult with Dr. Zamudio nephrology, avoid 

nephrotoxins and hypotension.  Hold of Lasix secondary to dehydration, losartan 

is held, continue on amlodipine and hydralazine with parameters.





3 paroxysmal atrial fibrillation on Coumadin.  INR 3.1 hold Coumadin today as 

there are some concerns regarding hemorrhagic component in the pelvis.  Repeat 

INR tomorrow vitamin K2.5 5 milligrams 1 dose given in the emergency room





4 diabetes2 with diabetic neuropathy continue Lantus 30 units daily at bedtime 

with hold 10 units lispro with sliding scale at this time until diet by mouth 

has fully recovered, correctional scale insulin to be given, but hold Januvia 

secondary to elevated creatinine





5.  Suspected hemorrhagic bleed in the pelvis from CT imaging, Coumadin on hold,

 consult , H&H every 6 hours, hold Coumadin, transvaginal ultrasound, CA-

125 monitor in ICU Dr. Colby intensivist





5 hypertension   on metoprolol  hold Lasix hold hydrochlorothiazide and 

amiloride.  Hold lisinopril for hyper hyperkalemia





6 Hyperlipidemia hold statins until CPK would stabilize





7 Mild protein calorie malnutrition





8 Mild transaminitis with slight elevation of alkaline phosphatase, check for l

ipase CMP to be monitored





 DVT prophylaxis INR therapeutic hold Coumadin INR tomorrow





 GI prophylaxis Pepcid 20 mg by mouth daily





 CODE STATUS full code





 





Disposition patient may need 1-2 inpatient night for stabilization

## 2019-03-12 NOTE — XR
EXAMINATION TYPE: XR chest 2V

 

DATE OF EXAM: 3/12/2019

 

COMPARISON: 6/20/2016

 

HISTORY: Shortness of breath

 

TECHNIQUE:  Frontal and lateral views of the chest are obtained.

 

FINDINGS:

 

Scattered senescent parenchymal changes noted. Hyperinflation compatible with COPD. 

 

No evidence for infiltrate. No evidence for atelectasis.

 

There is cardiomegaly with pulmonary venous congestion and small effusions.

 

Mediastinal structures are stable and grossly unremarkable.

 

No evidence for hilar prominence.

 

Degenerative changes dorsal spine. 

 

IMPRESSION:

1. Borderline to mild congestive failure.

## 2019-03-12 NOTE — US
EXAMINATION TYPE: US pelvic complete

 

DATE OF EXAM: 3/12/2019

 

COMPARISON: CT 10/1/2018

 

CLINICAL HISTORY: pain. 

? Partial hysterectomy; patient not sure if she has her ovaries.

 

TECHNIQUE:  Transabdominal (TA).  Transabdominal sonographic images of the pelvis were acquired.  

 

FINDINGS:

Uterus:  Surgically absent.

 

Right Ovary: Ovary not visualized, surgically absent? No right adnexal findings.

 

Left Ovary:  Ovary not visualized, surgically absent? 

Area of shadowing in the left adnexal region is noted, measuring 11.9 x 7.5 x 5.9 cm. This nonspecifi
c finding can be further characterized with CT if clinically indicated.

 

CUL-DE-SAC: No fluid.

 

IMPRESSION: 

Partially visualized left adnexal finding of unknown clinical significance; without CT correlate on 1
0/1/2018 CT.

## 2019-03-13 LAB
ALBUMIN SERPL-MCNC: 3.1 G/DL (ref 3.5–5)
ALP SERPL-CCNC: 163 U/L (ref 38–126)
ALT SERPL-CCNC: 50 U/L (ref 9–52)
ANION GAP SERPL CALC-SCNC: 13 MMOL/L
AST SERPL-CCNC: 44 U/L (ref 14–36)
BASOPHILS # BLD AUTO: 0.1 K/UL (ref 0–0.2)
BASOPHILS NFR BLD AUTO: 0 %
BUN SERPL-SCNC: 97 MG/DL (ref 7–17)
CALCIUM SPEC-MCNC: 8.4 MG/DL (ref 8.4–10.2)
CHLORIDE SERPL-SCNC: 101 MMOL/L (ref 98–107)
CK SERPL-CCNC: 228 U/L (ref 30–135)
CO2 SERPL-SCNC: 20 MMOL/L (ref 22–30)
EOSINOPHIL # BLD AUTO: 0.1 K/UL (ref 0–0.7)
EOSINOPHIL NFR BLD AUTO: 0 %
ERYTHROCYTE [DISTWIDTH] IN BLOOD BY AUTOMATED COUNT: 3.36 M/UL (ref 3.8–5.4)
ERYTHROCYTE [DISTWIDTH] IN BLOOD: 16.7 % (ref 11.5–15.5)
GLUCOSE BLD-MCNC: 119 MG/DL (ref 75–99)
GLUCOSE BLD-MCNC: 162 MG/DL (ref 75–99)
GLUCOSE BLD-MCNC: 191 MG/DL (ref 75–99)
GLUCOSE BLD-MCNC: 70 MG/DL (ref 75–99)
GLUCOSE SERPL-MCNC: 102 MG/DL (ref 74–99)
HCT VFR BLD AUTO: 28.1 % (ref 34–46)
HGB BLD-MCNC: 8.9 GM/DL (ref 11.4–16)
INR PPP: 1.5 (ref ?–1.2)
LYMPHOCYTES # SPEC AUTO: 1 K/UL (ref 1–4.8)
LYMPHOCYTES NFR SPEC AUTO: 5 %
MAGNESIUM SPEC-SCNC: 1.9 MG/DL (ref 1.6–2.3)
MCH RBC QN AUTO: 26.5 PG (ref 25–35)
MCHC RBC AUTO-ENTMCNC: 31.7 G/DL (ref 31–37)
MCV RBC AUTO: 83.5 FL (ref 80–100)
MONOCYTES # BLD AUTO: 1.8 K/UL (ref 0–1)
MONOCYTES NFR BLD AUTO: 9 %
NEUTROPHILS # BLD AUTO: 17.4 K/UL (ref 1.3–7.7)
NEUTROPHILS NFR BLD AUTO: 84 %
PLATELET # BLD AUTO: 277 K/UL (ref 150–450)
POTASSIUM SERPL-SCNC: 3.8 MMOL/L (ref 3.5–5.1)
PROT SERPL-MCNC: 7 G/DL (ref 6.3–8.2)
PT BLD: 15.4 SEC (ref 9–12)
SODIUM SERPL-SCNC: 134 MMOL/L (ref 137–145)
WBC # BLD AUTO: 20.9 K/UL (ref 3.8–10.6)

## 2019-03-13 RX ADMIN — POTASSIUM CHLORIDE SCH MLS/HR: 7.46 INJECTION, SOLUTION INTRAVENOUS at 02:58

## 2019-03-13 RX ADMIN — CEFAZOLIN SCH MLS/HR: 330 INJECTION, POWDER, FOR SOLUTION INTRAMUSCULAR; INTRAVENOUS at 12:18

## 2019-03-13 RX ADMIN — ACETAMINOPHEN PRN MG: 325 TABLET, FILM COATED ORAL at 21:26

## 2019-03-13 RX ADMIN — INSULIN DETEMIR SCH: 100 INJECTION, SOLUTION SUBCUTANEOUS at 22:26

## 2019-03-13 RX ADMIN — METOPROLOL TARTRATE SCH MG: 50 TABLET, FILM COATED ORAL at 21:27

## 2019-03-13 RX ADMIN — INSULIN ASPART SCH UNIT: 100 INJECTION, SOLUTION INTRAVENOUS; SUBCUTANEOUS at 22:24

## 2019-03-13 RX ADMIN — ASPIRIN 81 MG CHEWABLE TABLET SCH MG: 81 TABLET CHEWABLE at 21:27

## 2019-03-13 RX ADMIN — DOCUSATE SODIUM AND SENNOSIDES SCH EACH: 50; 8.6 TABLET ORAL at 12:18

## 2019-03-13 RX ADMIN — LEVOTHYROXINE SODIUM SCH MCG: 50 TABLET ORAL at 05:28

## 2019-03-13 RX ADMIN — METOPROLOL TARTRATE SCH MG: 50 TABLET, FILM COATED ORAL at 09:25

## 2019-03-13 RX ADMIN — MORPHINE SULFATE PRN MG: 4 INJECTION, SOLUTION INTRAMUSCULAR; INTRAVENOUS at 13:45

## 2019-03-13 RX ADMIN — MORPHINE SULFATE PRN MG: 4 INJECTION, SOLUTION INTRAMUSCULAR; INTRAVENOUS at 18:09

## 2019-03-13 RX ADMIN — INSULIN ASPART SCH: 100 INJECTION, SOLUTION INTRAVENOUS; SUBCUTANEOUS at 14:06

## 2019-03-13 RX ADMIN — SODIUM CHLORIDE SCH DROPS: 50 SOLUTION/ DROPS OPHTHALMIC at 22:31

## 2019-03-13 RX ADMIN — INSULIN ASPART SCH UNIT: 100 INJECTION, SOLUTION INTRAVENOUS; SUBCUTANEOUS at 18:09

## 2019-03-13 NOTE — CONS
CONSULTATION



REASON FOR CONSULT:

Renal failure.



HISTORY OF PRESENT ILLNESS:

The patient is an 84-year-old female who was admitted to the hospital yesterday with

abnormal labs.  The patient was sent over from the nursing home with elevated BUN and

creatinine.  She has been feeling more weak over the past few days.  The patient has

not been eating much.  She has been mostly in bed.  The patient does have an indwelling

Perez catheter.  I do not see any nonsteroidal anti-inflammatory agents listed on her

med list.  Blood pressure was not significantly low.  The lowest blood pressure was 117

mmHg systolic.

Patient's serum creatinine was 2.7 mg/dL today, on admission it was 3.1.  Review of

previous labs shows a creatinine of 1.28 on 11/15/2018.  The patient did have an

abdominal and pelvic CT yesterday which showed markedly atrophic right kidney.  No

hydronephrosis was seen, and there was a left renal lesion, most likely a cyst.

Another hyperdense lesion was noted on the left kidney.  The patient has had good urine

output with 24 hour urine output of about 975 mL.



PAST MEDICAL HISTORY:

Significant for hypertension, history of atrial fibrillation, type 2 diabetes,

hypothyroidism, hyperlipidemia, peripheral neuropathy, history of UTIs, history of

cervical cancer, peripheral vascular disease, osteoarthritis, previous history of MRSA

infection in the right foot.



PAST SURGICAL HISTORY:

Tonsillectomy, hysterectomy, right leg bypass surgery, cataract surgery.



SOCIAL HISTORY:

Negative for smoking, drug abuse or alcohol abuse.



MEDICATIONS AT HOME:

Included aspirin, Synthroid, Pravachol, insulin, potassium, multivitamins, Lasix,

Aldactone, Lopressor, Coumadin, hydralazine, Januvia.



ALLERGIES:

None.



REVIEW OF SYSTEMS:

Negative for fever, chills, nausea, vomiting, abdominal pain, or diarrhea.



PHYSICAL EXAMINATION:

Patient was seen this morning.  She was comfortable.  She is awake, not in any acute

distress.  Blood pressure was 117/66, heart rate of 90 per minute.  Patient is

afebrile.  Examination of the heart S1, S2.  Examination of the lungs bilateral breath

sounds are heard.  Decreased breath sounds at bases.

ABDOMEN:  Soft, obese.  Examination of lower extremities shows edema 1+ bilaterally.

CNS exam is grossly intact. Patient moving all 4 extremities.



LAB:

Show hemoglobin 8.9, sodium 134, potassium 3.8, chloride 101, BUN 97, serum creatinine

2.7, INR was 1.5, calcium 8.4, albumin 3.1.  UA shows 1+ protein, moderate blood,

nitrite positive, WBCs more than 182.  Urine culture is currently pending and chest x-

ray from yesterday shows borderline to mild congestive heart failure.



ASSESSMENT:

1. Acute kidney injury, appears to be prerenal.  Patient is maintained on IV fluids.

    Her renal function has improved.  There is no evidence of obstruction.  She does

    have a solitary functioning kidney with significant right renal atrophy noted on

    the CAT scan.  I will continue with IV fluids but turn it down to about 50 mL an

    hour.  Continue to encourage increased oral intake.  Patient is not on any

    nephrotoxic medications.  We need to avoid hypotension as well.

2. Chronic kidney disease and NKF stage III, with previous creatinine of about 1.28 on

    11/15/2018.  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  UA is protein

    but currently also has evidence of infection.

3. Urinary tract infection maintained on antibiotics.  Urine culture is pending.  The

    patient does have chronic indwelling Perez catheter.  I am not sure if this is

    asymptomatic bacteriuria.

4. Anemia, rule out iron deficiency.

5. History of atrial fibrillation.

6. Dyslipidemia.



PLAN:

Continue IV fluids.  Decrease fluids to 50 mL an hour.  Hold off on Lasix for now.

Repeat labs in a.m.  Check iron studies and follow up on urine cultures.



Thank you for this consultation.  We will continue to follow the patient with you

during her hospitalization.





MMODL / IJN: [id number] / Job#: [id number]

## 2019-03-13 NOTE — P.GSCN
<Rosalva Torres - Last Filed: 19 15:20>





History of Present Illness


Consult date: 19


Reason for Consult: 





possible pelvis hemorrhage 


Requesting physician: Radha Valdez


History of present illness: 





CHIEF COMPLAINT: possible pelvis hemorrhage





HISTORY OF PRESENT ILLNESS: 84-year-old  female who was admitted to the

hospital secondary to dehydration.  CT abdomen and pelvis was obtained which 

revealed small amount of free fluid in the pelvis. appears somewhat hyperdense 

which may represent a hemorrhagic component.  General surgery was consulted for 

further evaluation. Patient takes coumadin at home. INR on admission was 3.5. 

Coumadin has been reversed. INR today is 1.5. Patient was seen and examined this

morning at the bedside. She denies abdominal pain. Denies nausea and vomiting. 

She reports last BM was a couple days. Denies constipation. WBC 20.9. Hemoglobin

8.9.





PAST MEDICAL HISTORY: 


See list.





PAST SURGICAL HISTORY: 


See list.





MEDICATIONS: 


See list.





ALLERGIES: 


See list.





SOCIAL HISTORY: No illicit drug use.  





REVIEW OF SYSTEMS: 


CONSTITUTIONAL: Denies fever or chills.


HEENT: Denies blurred vision, vision changes, or eye pain. Denies hemoptysis 


ENDOCRINE: Denies heat or cold intolerance.


CARDIOVASCULAR: Denies chest pain or pressure.


RESPIRATORY: No shortness of breath. 


GASTROINTESTINAL: Denies abdominal pain. Denies nausea or vomiting.


PSYCH: No depression or suicidal ideation


HEMATOLOGIC: Denies bleeding disorders.


LYMPHATIC:  The patient denies any lumps and bumps around the neck.   


MUSCULOSKELETAL:  Denies myalgias. Denies joint swelling. Denies decreased range

of motion beyond patients baseline.


SKIN: Denies pruitis. Denies rash.





PHYSICAL EXAM: 


VITAL SIGNS: Currently stable.


GENERAL: Well-developed in no acute distress. 


HEENT:  No sclera icterus. Extraocular movements grossly intact.  Moist buccal 

mucosa. 


Head is atraumatic, normocephalic. Hears conversational speech. No nasal 

drainage.


NECK:  Supple without lymphadenopathy.


CHEST:  Non-labored respirations and equal bilateral excursions. 


CARDIOVASCULAR:  Regular rate with regular rhythm.  Palpable 2+ radial pulses.


ABDOMEN:  Soft.  Nondistended. Nontender. Positive bowel sounds.


MUSCULOSKELETAL:  No clubbing, cyanosis or edema.


NEUROLOGIC:  No focal or lateralizing signs.  Cranial nerves II through XII 

grossly intact.


PSYCH:  Appropriate affect.  Alert and oriented to person, place and time.


SKIN: Well perfused.  Good skin turgor. 





IMAGIN. CT abdomen and pelvis: Small amount of free fluid in the pelvis.  appears 

somewhat hyperdense which may represent a hemorrhagic component.


2.  Pelvic ultrasound: Ovaries not visualized.  Area of shadowing in the left 

adnexal region is noted measuring 11.9 x 7.5 x 5.9 cm.





ASSESSMENT: 


1.  Hyperdense region in pelvis which may represent a hemorrhagic component per 

CT, patient without drop in hemoglobin and hemodynamically stable


2.  Elevated CA-125, history of hysterectomy including oophorectomy


3.  Leukocytosis





PLAN: 


No surgical intervention at this time. Patient is stable from a surgical 

perspective. She may resume Coumadin from our standpoint. Will defer to 

medicine. 





Nurse practitioner note has been reviewed by physician. Signing provider agrees 

with the documented findings, assessment, and plan of care. 








Past Medical History


Past Medical History: Atrial Fibrillation, Diabetes Mellitus, Hyperlipidemia, 

Hypertension, Thyroid Disorder


Additional Past Medical History / Comment(s): peripheral neuropathy, UTIs, u

rinary incontinence, OA bilateral hands and back, PVD, cervical cancer


History of Any Multi-Drug Resistant Organisms: MRSA


Year Discovered:: 2016


MDRO Source:: RIGHT FOOT


Past Surgical History: Hysterectomy, Tonsillectomy


Additional Past Surgical History / Comment(s): R leg bypass surgery, bilateral 

cataract removal


Past Anesthesia/Blood Transfusion Reactions: No Reported Reaction


Past Psychological History: No Psychological Hx Reported


Additional Psychological History / Comment(s): Pt states she lives at [facility].  States uses a wheelchair to get around.


Smoking Status: Never smoker


Past Alcohol Use History: None Reported


Additional Past Alcohol Use History / Comment(s): Patient resides at [facility] and is wheelchair bound.


Past Drug Use History: None Reported





- Past Family History


  ** Mother


Family Medical History: CVA/TIA


Additional Family Medical History / Comment(s): Mother  in her 70's.





  ** Father


Family Medical History: Hypertension


Additional Family Medical History / Comment(s): Father  in his 70's.





Medications and Allergies


                                Home Medications











 Medication  Instructions  Recorded  Confirmed  Type


 


Aspirin 81 mg PO HS 16 History


 


Levothyroxine Sodium [Synthroid] 50 mcg PO QAM 16 History


 


Pravastatin Sodium [Pravachol] 80 mg PO HS 16 History


 


Insulin Glargine [Lantus] 26 unit SQ HS 17 History


 


INSULIN LISPRO (humaLOG) [humaLOG] 10 units SQ AC-TID 17 History


 


Sennosides-Docusate Sodium 1 tab PO DAILY 17 History





[Senokot-S]    


 


Multivitamins, Thera [Multivitamin 1 tab PO DAILY 10/01/18 03/12/19 History





(formulary)]    


 


Potassium Chloride ER [K-Dur 10] 10 meq PO DAILY 10/01/18 03/12/19 History


 


Sodium Chloride 5% Ophth Soln 1 drop BOTH EYES BID 10/01/18 03/12/19 History





[Vahid 128]    


 


Furosemide [Lasix] 40 mg PO DAILY #0 10/05/18 03/12/19 Rx


 


Amino Acids/Protein Hydrolys 30 ml PO DAILY 18 History





[Pro-Stat Supplement]    


 


amLODIPine BESYLATE 5 mg PO DAILY 18 History


 


Spironolactone [Aldactone] 12.5 mg PO DAILY 18 History


 


Cranberry 450mg 1 tab PO DAILY 19 History


 


Losartan Potassium 100 mg PO DAILY 19 History


 


Metoprolol Tartrate [Lopressor] 100 mg PO BID 19 History


 


Warfarin [Coumadin] 2.5 mg PO HS 19 History


 


hydrALAZINE HCL [Apresoline] 100 mg PO TID 19 History


 


sitaGLIPtin PHOSPHATE [Januvia] 50 mg PO HS 19 History








                                    Allergies











Allergy/AdvReac Type Severity Reaction Status Date / Time


 


No Known Allergies Allergy   Verified 19 13:16














Surgical - Exam


                                   Vital Signs











Temp Pulse Resp BP Pulse Ox


 


 99.1 F   96   18   122/54   100 


 


 19 12:37  19 12:37  19 12:37  19 12:37  19 12:37














Results





- Labs





                                 19 06:44





                                 19 06:44


                  Abnormal Lab Results - Last 24 Hours (Table)











  19 Range/Units





  14:45 17:51 17:51 


 


WBC   17.3 H   (3.8-10.6)  k/uL


 


RBC   3.13 L   (3.80-5.40)  m/uL


 


Hgb   8.2 L   (11.4-16.0)  gm/dL


 


Hct   26.1 L   (34.0-46.0)  %


 


RDW   16.6 H   (11.5-15.5)  %


 


Neutrophils #   14.0 H   (1.3-7.7)  k/uL


 


Monocytes #   1.6 H   (0-1.0)  k/uL


 


PT     (9.0-12.0)  sec


 


INR     (<1.2)  


 


Sodium    134 L  (137-145)  mmol/L


 


Potassium    3.2 L  (3.5-5.1)  mmol/L


 


Carbon Dioxide    21 L  (22-30)  mmol/L


 


BUN    106 H*  (7-17)  mg/dL


 


Creatinine    3.08 H  (0.52-1.04)  mg/dL


 


Glucose     (74-99)  mg/dL


 


POC Glucose (mg/dL)     (75-99)  mg/dL


 


Calcium    8.0 L  (8.4-10.2)  mg/dL


 


AST    65 H  (14-36)  U/L


 


ALT    56 H  (9-52)  U/L


 


Alkaline Phosphatase    166 H  ()  U/L


 


Creatine Kinase     ()  U/L


 


Albumin    3.0 L  (3.5-5.0)  g/dL


 


 Antigen     (0.0-30.1)  U/mL


 


Urine Appearance  Cloudy H    (Clear)  


 


Urine Protein  1+ H    (Negative)  


 


Urine Blood  Moderate H    (Negative)  


 


Urine Nitrite  Positive H    (Negative)  


 


Ur Leukocyte Esterase  Large H    (Negative)  


 


Urine RBC  134 H    (0-5)  /hpf


 


Urine WBC  >182 H    (0-5)  /hpf


 


Urine WBC Clumps  Many H    (None)  /hpf


 


Urine Bacteria  Many H    (None)  /hpf


 


Urine Mucus  Rare H    (None)  /hpf














  19 Range/Units





  17:51 22:52 06:02 


 


WBC   16.8 H   (3.8-10.6)  k/uL


 


RBC   3.16 L   (3.80-5.40)  m/uL


 


Hgb   8.7 L   (11.4-16.0)  gm/dL


 


Hct   26.3 L   (34.0-46.0)  %


 


RDW   16.7 H   (11.5-15.5)  %


 


Neutrophils #     (1.3-7.7)  k/uL


 


Monocytes #     (0-1.0)  k/uL


 


PT     (9.0-12.0)  sec


 


INR     (<1.2)  


 


Sodium     (137-145)  mmol/L


 


Potassium     (3.5-5.1)  mmol/L


 


Carbon Dioxide     (22-30)  mmol/L


 


BUN     (7-17)  mg/dL


 


Creatinine     (0.52-1.04)  mg/dL


 


Glucose     (74-99)  mg/dL


 


POC Glucose (mg/dL)    70 L  (75-99)  mg/dL


 


Calcium     (8.4-10.2)  mg/dL


 


AST     (14-36)  U/L


 


ALT     (9-52)  U/L


 


Alkaline Phosphatase     ()  U/L


 


Creatine Kinase     ()  U/L


 


Albumin     (3.5-5.0)  g/dL


 


 Antigen  147.4 H    (0.0-30.1)  U/mL


 


Urine Appearance     (Clear)  


 


Urine Protein     (Negative)  


 


Urine Blood     (Negative)  


 


Urine Nitrite     (Negative)  


 


Ur Leukocyte Esterase     (Negative)  


 


Urine RBC     (0-5)  /hpf


 


Urine WBC     (0-5)  /hpf


 


Urine WBC Clumps     (None)  /hpf


 


Urine Bacteria     (None)  /hpf


 


Urine Mucus     (None)  /hpf














  19 Range/Units





  06:44 06:44 06:44 


 


WBC  20.9 H    (3.8-10.6)  k/uL


 


RBC  3.36 L    (3.80-5.40)  m/uL


 


Hgb  8.9 L    (11.4-16.0)  gm/dL


 


Hct  28.1 L    (34.0-46.0)  %


 


RDW  16.7 H    (11.5-15.5)  %


 


Neutrophils #  17.4 H    (1.3-7.7)  k/uL


 


Monocytes #  1.8 H    (0-1.0)  k/uL


 


PT    15.4 H  (9.0-12.0)  sec


 


INR    1.5 H  (<1.2)  


 


Sodium   134 L   (137-145)  mmol/L


 


Potassium     (3.5-5.1)  mmol/L


 


Carbon Dioxide   20 L   (22-30)  mmol/L


 


BUN   97 H   (7-17)  mg/dL


 


Creatinine   2.70 H   (0.52-1.04)  mg/dL


 


Glucose   102 H   (74-99)  mg/dL


 


POC Glucose (mg/dL)     (75-99)  mg/dL


 


Calcium     (8.4-10.2)  mg/dL


 


AST   44 H   (14-36)  U/L


 


ALT     (9-52)  U/L


 


Alkaline Phosphatase   163 H   ()  U/L


 


Creatine Kinase   228 H   ()  U/L


 


Albumin   3.1 L   (3.5-5.0)  g/dL


 


 Antigen     (0.0-30.1)  U/mL


 


Urine Appearance     (Clear)  


 


Urine Protein     (Negative)  


 


Urine Blood     (Negative)  


 


Urine Nitrite     (Negative)  


 


Ur Leukocyte Esterase     (Negative)  


 


Urine RBC     (0-5)  /hpf


 


Urine WBC     (0-5)  /hpf


 


Urine WBC Clumps     (None)  /hpf


 


Urine Bacteria     (None)  /hpf


 


Urine Mucus     (None)  /hpf














  19 Range/Units





  11:28 


 


WBC   (3.8-10.6)  k/uL


 


RBC   (3.80-5.40)  m/uL


 


Hgb   (11.4-16.0)  gm/dL


 


Hct   (34.0-46.0)  %


 


RDW   (11.5-15.5)  %


 


Neutrophils #   (1.3-7.7)  k/uL


 


Monocytes #   (0-1.0)  k/uL


 


PT   (9.0-12.0)  sec


 


INR   (<1.2)  


 


Sodium   (137-145)  mmol/L


 


Potassium   (3.5-5.1)  mmol/L


 


Carbon Dioxide   (22-30)  mmol/L


 


BUN   (7-17)  mg/dL


 


Creatinine   (0.52-1.04)  mg/dL


 


Glucose   (74-99)  mg/dL


 


POC Glucose (mg/dL)  119 H  (75-99)  mg/dL


 


Calcium   (8.4-10.2)  mg/dL


 


AST   (14-36)  U/L


 


ALT   (9-52)  U/L


 


Alkaline Phosphatase   ()  U/L


 


Creatine Kinase   ()  U/L


 


Albumin   (3.5-5.0)  g/dL


 


 Antigen   (0.0-30.1)  U/mL


 


Urine Appearance   (Clear)  


 


Urine Protein   (Negative)  


 


Urine Blood   (Negative)  


 


Urine Nitrite   (Negative)  


 


Ur Leukocyte Esterase   (Negative)  


 


Urine RBC   (0-5)  /hpf


 


Urine WBC   (0-5)  /hpf


 


Urine WBC Clumps   (None)  /hpf


 


Urine Bacteria   (None)  /hpf


 


Urine Mucus   (None)  /hpf








                      Microbiology - Last 24 Hours (Table)











 19 14:45 Urine Culture - Preliminary





 Urine,Catheterized 








                                 Diabetes panel











  19 Range/Units





  17:51 06:44 


 


Sodium  134 L  134 L  (137-145)  mmol/L


 


Potassium  3.2 L  3.8  (3.5-5.1)  mmol/L


 


Chloride  101  101  ()  mmol/L


 


Carbon Dioxide  21 L  20 L  (22-30)  mmol/L


 


BUN  106 H*  97 H  (7-17)  mg/dL


 


Creatinine  3.08 H  2.70 H  (0.52-1.04)  mg/dL


 


Glucose  86  102 H  (74-99)  mg/dL


 


Calcium  8.0 L  8.4  (8.4-10.2)  mg/dL


 


AST  65 H  44 H  (14-36)  U/L


 


ALT  56 H  50  (9-52)  U/L


 


Alkaline Phosphatase  166 H  163 H  ()  U/L


 


Total Protein  6.8  7.0  (6.3-8.2)  g/dL


 


Albumin  3.0 L  3.1 L  (3.5-5.0)  g/dL








                                  Calcium panel











  19 Range/Units





  17:51 06:44 


 


Calcium  8.0 L  8.4  (8.4-10.2)  mg/dL


 


Albumin  3.0 L  3.1 L  (3.5-5.0)  g/dL








                                 Pituitary panel











  19 Range/Units





  17:51 06:44 


 


Sodium  134 L  134 L  (137-145)  mmol/L


 


Potassium  3.2 L  3.8  (3.5-5.1)  mmol/L


 


Chloride  101  101  ()  mmol/L


 


Carbon Dioxide  21 L  20 L  (22-30)  mmol/L


 


BUN  106 H*  97 H  (7-17)  mg/dL


 


Creatinine  3.08 H  2.70 H  (0.52-1.04)  mg/dL


 


Glucose  86  102 H  (74-99)  mg/dL


 


Calcium  8.0 L  8.4  (8.4-10.2)  mg/dL








                                  Adrenal panel











  19 Range/Units





  17:51 06:44 


 


Sodium  134 L  134 L  (137-145)  mmol/L


 


Potassium  3.2 L  3.8  (3.5-5.1)  mmol/L


 


Chloride  101  101  ()  mmol/L


 


Carbon Dioxide  21 L  20 L  (22-30)  mmol/L


 


BUN  106 H*  97 H  (7-17)  mg/dL


 


Creatinine  3.08 H  2.70 H  (0.52-1.04)  mg/dL


 


Glucose  86  102 H  (74-99)  mg/dL


 


Calcium  8.0 L  8.4  (8.4-10.2)  mg/dL


 


Total Bilirubin  0.8  1.1  (0.2-1.3)  mg/dL


 


AST  65 H  44 H  (14-36)  U/L


 


ALT  56 H  50  (9-52)  U/L


 


Alkaline Phosphatase  166 H  163 H  ()  U/L


 


Total Protein  6.8  7.0  (6.3-8.2)  g/dL


 


Albumin  3.0 L  3.1 L  (3.5-5.0)  g/dL














<Yazan Anderson - Last Filed: 19 18:29>





History of Present Illness


History of present illness: 





As above.  We were consulted regarding CAT scan findings showing possible bloody

 fluid within the pelvis.  Patient has an elevated white blood cell count likely

 related to urinary tract infection with indwelling Perez catheter.  Patient has

 no abdominal pain.  Abdominal exam is benign.  Elevated  when checked.  

If family interested in further workup diagnostic paracentesis could be 

requested of radiology.  At this time may resume Coumadin.  Continue diet.  Will

 follow.





Surgical - Exam


                                   Vital Signs











Temp Pulse Resp BP Pulse Ox


 


 99.1 F   96   18   122/54   100 


 


 19 12:37  19 12:37  19 12:37  19 12:37  19 12:37














Results





- Labs





                                 19 06:44





                                 19 06:44


                  Abnormal Lab Results - Last 24 Hours (Table)











  19 Range/Units





  17:51 22:52 06:02 


 


WBC   16.8 H   (3.8-10.6)  k/uL


 


RBC   3.16 L   (3.80-5.40)  m/uL


 


Hgb   8.7 L   (11.4-16.0)  gm/dL


 


Hct   26.3 L   (34.0-46.0)  %


 


RDW   16.7 H   (11.5-15.5)  %


 


Neutrophils #     (1.3-7.7)  k/uL


 


Monocytes #     (0-1.0)  k/uL


 


PT     (9.0-12.0)  sec


 


INR     (<1.2)  


 


Sodium     (137-145)  mmol/L


 


Carbon Dioxide     (22-30)  mmol/L


 


BUN     (7-17)  mg/dL


 


Creatinine     (0.52-1.04)  mg/dL


 


Glucose     (74-99)  mg/dL


 


POC Glucose (mg/dL)    70 L  (75-99)  mg/dL


 


AST     (14-36)  U/L


 


Alkaline Phosphatase     ()  U/L


 


Creatine Kinase     ()  U/L


 


Albumin     (3.5-5.0)  g/dL


 


 Antigen  147.4 H    (0.0-30.1)  U/mL














  19 Range/Units





  06:44 06:44 06:44 


 


WBC  20.9 H    (3.8-10.6)  k/uL


 


RBC  3.36 L    (3.80-5.40)  m/uL


 


Hgb  8.9 L    (11.4-16.0)  gm/dL


 


Hct  28.1 L    (34.0-46.0)  %


 


RDW  16.7 H    (11.5-15.5)  %


 


Neutrophils #  17.4 H    (1.3-7.7)  k/uL


 


Monocytes #  1.8 H    (0-1.0)  k/uL


 


PT    15.4 H  (9.0-12.0)  sec


 


INR    1.5 H  (<1.2)  


 


Sodium   134 L   (137-145)  mmol/L


 


Carbon Dioxide   20 L   (22-30)  mmol/L


 


BUN   97 H   (7-17)  mg/dL


 


Creatinine   2.70 H   (0.52-1.04)  mg/dL


 


Glucose   102 H   (74-99)  mg/dL


 


POC Glucose (mg/dL)     (75-99)  mg/dL


 


AST   44 H   (14-36)  U/L


 


Alkaline Phosphatase   163 H   ()  U/L


 


Creatine Kinase   228 H   ()  U/L


 


Albumin   3.1 L   (3.5-5.0)  g/dL


 


 Antigen     (0.0-30.1)  U/mL














  19 Range/Units





  11:28 16:36 


 


WBC    (3.8-10.6)  k/uL


 


RBC    (3.80-5.40)  m/uL


 


Hgb    (11.4-16.0)  gm/dL


 


Hct    (34.0-46.0)  %


 


RDW    (11.5-15.5)  %


 


Neutrophils #    (1.3-7.7)  k/uL


 


Monocytes #    (0-1.0)  k/uL


 


PT    (9.0-12.0)  sec


 


INR    (<1.2)  


 


Sodium    (137-145)  mmol/L


 


Carbon Dioxide    (22-30)  mmol/L


 


BUN    (7-17)  mg/dL


 


Creatinine    (0.52-1.04)  mg/dL


 


Glucose    (74-99)  mg/dL


 


POC Glucose (mg/dL)  119 H  191 H  (75-99)  mg/dL


 


AST    (14-36)  U/L


 


Alkaline Phosphatase    ()  U/L


 


Creatine Kinase    ()  U/L


 


Albumin    (3.5-5.0)  g/dL


 


 Antigen    (0.0-30.1)  U/mL








                      Microbiology - Last 24 Hours (Table)











 19 13:00 Blood Culture - Preliminary





 Blood    No Growth after 24 hours


 


 19 14:45 Urine Culture - Preliminary





 Urine,Catheterized 








                                 Diabetes panel











  19 Range/Units





  06:44 


 


Sodium  134 L  (137-145)  mmol/L


 


Potassium  3.8  (3.5-5.1)  mmol/L


 


Chloride  101  ()  mmol/L


 


Carbon Dioxide  20 L  (22-30)  mmol/L


 


BUN  97 H  (7-17)  mg/dL


 


Creatinine  2.70 H  (0.52-1.04)  mg/dL


 


Glucose  102 H  (74-99)  mg/dL


 


Calcium  8.4  (8.4-10.2)  mg/dL


 


AST  44 H  (14-36)  U/L


 


ALT  50  (9-52)  U/L


 


Alkaline Phosphatase  163 H  ()  U/L


 


Total Protein  7.0  (6.3-8.2)  g/dL


 


Albumin  3.1 L  (3.5-5.0)  g/dL








                                  Calcium panel











  19 Range/Units





  06:44 


 


Calcium  8.4  (8.4-10.2)  mg/dL


 


Albumin  3.1 L  (3.5-5.0)  g/dL








                                 Pituitary panel











  19 Range/Units





  06:44 


 


Sodium  134 L  (137-145)  mmol/L


 


Potassium  3.8  (3.5-5.1)  mmol/L


 


Chloride  101  ()  mmol/L


 


Carbon Dioxide  20 L  (22-30)  mmol/L


 


BUN  97 H  (7-17)  mg/dL


 


Creatinine  2.70 H  (0.52-1.04)  mg/dL


 


Glucose  102 H  (74-99)  mg/dL


 


Calcium  8.4  (8.4-10.2)  mg/dL








                                  Adrenal panel











  19 Range/Units





  06:44 


 


Sodium  134 L  (137-145)  mmol/L


 


Potassium  3.8  (3.5-5.1)  mmol/L


 


Chloride  101  ()  mmol/L


 


Carbon Dioxide  20 L  (22-30)  mmol/L


 


BUN  97 H  (7-17)  mg/dL


 


Creatinine  2.70 H  (0.52-1.04)  mg/dL


 


Glucose  102 H  (74-99)  mg/dL


 


Calcium  8.4  (8.4-10.2)  mg/dL


 


Total Bilirubin  1.1  (0.2-1.3)  mg/dL


 


AST  44 H  (14-36)  U/L


 


ALT  50  (9-52)  U/L


 


Alkaline Phosphatase  163 H  ()  U/L


 


Total Protein  7.0  (6.3-8.2)  g/dL


 


Albumin  3.1 L  (3.5-5.0)  g/dL

## 2019-03-13 NOTE — P.PN
Subjective


Progress Note Date: 03/13/19








This is an 84-year-old pleasant female followed by Dr. Pardo in [facility], known history off diabetes mellitus type 2 requiring insulin, peripheral 

neuropathy hypertension, hypothyroidism atrial fibrillation, admitted to 

[facility] via EMS transfer secondary to increasing dehy

dration.  Patient has had diminished appetite, over the past 4-5 days, no 

diarrhea, patient is on diuretics, for chronic dependent edema, heat she has had

increasing fatigue, weakness, patient cannot specify dysuria, however his 

recurrent low back pain, and has urinary incontinence occasional chills and 

myalgia. has nausea, bruises, chronic edema right leg more than left, chronic 

ertana x 2 years





In the emergency room creatinine was 3.18,  she was subsequently admitted for 

acute kidney failure, and azotemia electrolyte abnormalities, as well as  with 

acute urinary tract infection  urinalysis leukocyte positive nitrate positive 

with WBC clumping computed tomography scan emergency room, right kidney is 

markedly atrophic, hypodense lesion extending off the left kidney stable from 

previous, perinephric edema noted, bladder was taken, 1 cm lesion upper pole 

left kidney no hydronephrosis noted, lung bases shows cardiomegaly with right 

lower lobe left lower lobe consolidation pleural effusion there is small amount 

of free fluid in the pelvis, hypodense could represent hemorrhagic component, 

they have recommended pelvic ultrasound to exclude adrenal adnexal mass also 

localized focal prominence of pancreatic body however this is difficult to 

address to have recommended MRI





3/13: Patient has been afebrile, heart rate in the 80s, blood pressure 118/64, 

pulse ox 96% on room air.  Repeat lab work shows a white count of 20.9, 

hemoglobin 8.9, platelet count 277, INR 1.5.  Sodium 134, potassium 3.8, 

chloride 101, CO2 20, BUN 97 creatinine 2.7.  Liver function tests are improved 

with AST of 44, ALT 50, alkaline phosphatase 163.  .  Retana catheter was 

changed in the ER.  Urine culture is in progress.  Patient has been seen by Dr. Anderson with no plan for any surgical intervention.   came back at 147.4.  

Coumadin is currently on hold.  Renal artery ultrasound ordered to rule out 

renal artery stenosis which will be done tomorrow.








Review Of Systems:


Constitutional: No fever, no chills, no night sweats.  No weight change.  

Reports weakness, fatigue or lethargy.  Reports daytime sleepiness.


EENT: No headache.  No blurred vision or double vision, no loss of vision.  No 

loss of Hearing, no ringing in the ears, no dizziness.  No nasal drainage or 

congestion.  No epistaxis.  No sore throat.


Lungs: No shortness of breath, cough, no sputum production.  No wheezing.


Cardiovascular: No chest pain, no lower extremity edema.  No palpitations.  No 

paroxysmal nocturnal dyspnea.  No orthopnea.  Reports lightheadedness or 

dizziness.  No syncopal episodes.


Abdominal: Reports abdominal pain.  Reports bloating, reports early satiety, No 

nausea, vomiting.  No diarrhea.  No constipation.  No bloody or tarry stools..  

No loss of appetite.


Genitourinary: No dysuria, increased frequency, urgency.  No urinary retention. 

Reports chronic Retana catheter


Musculoskeletal: No myalgias.  No muscle weakness, reports gait dysfunction, no 

frequent falls.  No back pain.  No neck pain.


Integumentary:  No rash or pruritus.  No unusual bruising.  No change in hair or

nails.


Neurologic: Reports weakness, reports loss of vision.


Psychiatric: No depression.  No anxiety.  No mood swings.


Endocrine: No abnormal blood sugars.  No weight change.  No excessive sweating 

or thirst.  No cold intolerance.  No weight change.











Objective





- Vital Signs


Vital signs: 


                                   Vital Signs











Temp  96 F L  03/13/19 03:50


 


Pulse  82   03/13/19 03:50


 


Resp  16   03/13/19 03:50


 


BP  118/64   03/13/19 03:50


 


Pulse Ox  96   03/13/19 03:50








                                 Intake & Output











 03/12/19 03/13/19 03/13/19





 18:59 06:59 18:59


 


Intake Total  700 100


 


Output Total  975 


 


Balance  -275 100


 


Weight 92.76 kg 82.9 kg 


 


Intake:   


 


  Intake, IV Titration  700 





  Amount   


 


    Potassium Chloride 10 meq  400 





    In Water For Injection 1   





    100ml.bag @ 100 mls/hr   





    IVPB Q1HR URI Rx#:   





    [id number]   


 


    Sodium Chloride 0.9% 1,  300 





    000 ml @ 75 mls/hr IV .   





    [id number] STA Rx#:[id number]   


 


  Oral   100


 


Output:   


 


  Urine  975 


 


Other:   


 


  Voiding Method  Indwelling Catheter 


 


  # Voids 1 1 














- Exam





General appearance: cooperative, no acute distress, patient resting in recliner





- EENT


Eyes: anicteric sclerae, EOMI, PERRLA, photophobia


ENT: NA/AT, normal oropharynx





- Neck


Neck: normal ROM





- Respiratory


Respiratory: bilateral: CTA, negative: diminished, dullness, rales





- Cardiovascular


Rhythm: regular


Heart sounds: normal: S1, S2


Abnormal Heart Sounds: no systolic murmur, no diastolic murmur, no rub, no S3 

Gallop, no S4 Gallop, no click, no other





- Gastrointestinal


General gastrointestinal: normal bowel sounds, soft





- Integumentary


Integumentary: decreased turgor, normal





- Neurologic


Neurologic: CNII-XII intact





- Musculoskeletal


Musculoskeletal: gait normal, strength equal bilaterally





- Psychiatric


Psychiatric: appropriate affect, intact judgment & insight








- Labs


CBC & Chem 7: 


                                 03/13/19 06:44





                                 03/13/19 06:44


Labs: 


                  Abnormal Lab Results - Last 24 Hours (Table)











  03/12/19 03/12/19 03/12/19 Range/Units





  12:54 12:54 12:54 


 


WBC  15.2 H    (3.8-10.6)  k/uL


 


RBC  3.27 L    (3.80-5.40)  m/uL


 


Hgb  8.4 L    (11.4-16.0)  gm/dL


 


Hct  26.8 L    (34.0-46.0)  %


 


RDW  16.6 H    (11.5-15.5)  %


 


Neutrophils #  12.2 H    (1.3-7.7)  k/uL


 


Monocytes #  1.4 H    (0-1.0)  k/uL


 


PT    33.3 H  (9.0-12.0)  sec


 


INR    3.5 H  (<1.2)  


 


APTT    35.7 H  (22.0-30.0)  sec


 


Sodium   131 L   (137-145)  mmol/L


 


Potassium   3.2 L   (3.5-5.1)  mmol/L


 


Chloride   97 L   ()  mmol/L


 


Carbon Dioxide     (22-30)  mmol/L


 


BUN   107 H*   (7-17)  mg/dL


 


Creatinine   3.18 H   (0.52-1.04)  mg/dL


 


Glucose   101 H   (74-99)  mg/dL


 


POC Glucose (mg/dL)     (75-99)  mg/dL


 


Calcium     (8.4-10.2)  mg/dL


 


AST   57 H   (14-36)  U/L


 


ALT     (9-52)  U/L


 


Alkaline Phosphatase   143 H   ()  U/L


 


Creatine Kinase     ()  U/L


 


Albumin   3.0 L   (3.5-5.0)  g/dL


 


 Antigen     (0.0-30.1)  U/mL


 


Urine Appearance     (Clear)  


 


Urine Protein     (Negative)  


 


Urine Blood     (Negative)  


 


Urine Nitrite     (Negative)  


 


Ur Leukocyte Esterase     (Negative)  


 


Urine RBC     (0-5)  /hpf


 


Urine WBC     (0-5)  /hpf


 


Urine WBC Clumps     (None)  /hpf


 


Urine Bacteria     (None)  /hpf


 


Urine Mucus     (None)  /hpf














  03/12/19 03/12/19 03/12/19 Range/Units





  14:45 17:51 17:51 


 


WBC   17.3 H   (3.8-10.6)  k/uL


 


RBC   3.13 L   (3.80-5.40)  m/uL


 


Hgb   8.2 L   (11.4-16.0)  gm/dL


 


Hct   26.1 L   (34.0-46.0)  %


 


RDW   16.6 H   (11.5-15.5)  %


 


Neutrophils #   14.0 H   (1.3-7.7)  k/uL


 


Monocytes #   1.6 H   (0-1.0)  k/uL


 


PT     (9.0-12.0)  sec


 


INR     (<1.2)  


 


APTT     (22.0-30.0)  sec


 


Sodium    134 L  (137-145)  mmol/L


 


Potassium    3.2 L  (3.5-5.1)  mmol/L


 


Chloride     ()  mmol/L


 


Carbon Dioxide    21 L  (22-30)  mmol/L


 


BUN    106 H*  (7-17)  mg/dL


 


Creatinine    3.08 H  (0.52-1.04)  mg/dL


 


Glucose     (74-99)  mg/dL


 


POC Glucose (mg/dL)     (75-99)  mg/dL


 


Calcium    8.0 L  (8.4-10.2)  mg/dL


 


AST    65 H  (14-36)  U/L


 


ALT    56 H  (9-52)  U/L


 


Alkaline Phosphatase    166 H  ()  U/L


 


Creatine Kinase     ()  U/L


 


Albumin    3.0 L  (3.5-5.0)  g/dL


 


 Antigen     (0.0-30.1)  U/mL


 


Urine Appearance  Cloudy H    (Clear)  


 


Urine Protein  1+ H    (Negative)  


 


Urine Blood  Moderate H    (Negative)  


 


Urine Nitrite  Positive H    (Negative)  


 


Ur Leukocyte Esterase  Large H    (Negative)  


 


Urine RBC  134 H    (0-5)  /hpf


 


Urine WBC  >182 H    (0-5)  /hpf


 


Urine WBC Clumps  Many H    (None)  /hpf


 


Urine Bacteria  Many H    (None)  /hpf


 


Urine Mucus  Rare H    (None)  /hpf














  03/12/19 03/12/19 03/13/19 Range/Units





  17:51 22:52 06:02 


 


WBC   16.8 H   (3.8-10.6)  k/uL


 


RBC   3.16 L   (3.80-5.40)  m/uL


 


Hgb   8.7 L   (11.4-16.0)  gm/dL


 


Hct   26.3 L   (34.0-46.0)  %


 


RDW   16.7 H   (11.5-15.5)  %


 


Neutrophils #     (1.3-7.7)  k/uL


 


Monocytes #     (0-1.0)  k/uL


 


PT     (9.0-12.0)  sec


 


INR     (<1.2)  


 


APTT     (22.0-30.0)  sec


 


Sodium     (137-145)  mmol/L


 


Potassium     (3.5-5.1)  mmol/L


 


Chloride     ()  mmol/L


 


Carbon Dioxide     (22-30)  mmol/L


 


BUN     (7-17)  mg/dL


 


Creatinine     (0.52-1.04)  mg/dL


 


Glucose     (74-99)  mg/dL


 


POC Glucose (mg/dL)    70 L  (75-99)  mg/dL


 


Calcium     (8.4-10.2)  mg/dL


 


AST     (14-36)  U/L


 


ALT     (9-52)  U/L


 


Alkaline Phosphatase     ()  U/L


 


Creatine Kinase     ()  U/L


 


Albumin     (3.5-5.0)  g/dL


 


 Antigen  147.4 H    (0.0-30.1)  U/mL


 


Urine Appearance     (Clear)  


 


Urine Protein     (Negative)  


 


Urine Blood     (Negative)  


 


Urine Nitrite     (Negative)  


 


Ur Leukocyte Esterase     (Negative)  


 


Urine RBC     (0-5)  /hpf


 


Urine WBC     (0-5)  /hpf


 


Urine WBC Clumps     (None)  /hpf


 


Urine Bacteria     (None)  /hpf


 


Urine Mucus     (None)  /hpf














  03/13/19 03/13/19 03/13/19 Range/Units





  06:44 06:44 06:44 


 


WBC  20.9 H    (3.8-10.6)  k/uL


 


RBC  3.36 L    (3.80-5.40)  m/uL


 


Hgb  8.9 L    (11.4-16.0)  gm/dL


 


Hct  28.1 L    (34.0-46.0)  %


 


RDW  16.7 H    (11.5-15.5)  %


 


Neutrophils #  17.4 H    (1.3-7.7)  k/uL


 


Monocytes #  1.8 H    (0-1.0)  k/uL


 


PT    15.4 H  (9.0-12.0)  sec


 


INR    1.5 H  (<1.2)  


 


APTT     (22.0-30.0)  sec


 


Sodium   134 L   (137-145)  mmol/L


 


Potassium     (3.5-5.1)  mmol/L


 


Chloride     ()  mmol/L


 


Carbon Dioxide   20 L   (22-30)  mmol/L


 


BUN   97 H   (7-17)  mg/dL


 


Creatinine   2.70 H   (0.52-1.04)  mg/dL


 


Glucose   102 H   (74-99)  mg/dL


 


POC Glucose (mg/dL)     (75-99)  mg/dL


 


Calcium     (8.4-10.2)  mg/dL


 


AST   44 H   (14-36)  U/L


 


ALT     (9-52)  U/L


 


Alkaline Phosphatase   163 H   ()  U/L


 


Creatine Kinase   228 H   ()  U/L


 


Albumin   3.1 L   (3.5-5.0)  g/dL


 


 Antigen     (0.0-30.1)  U/mL


 


Urine Appearance     (Clear)  


 


Urine Protein     (Negative)  


 


Urine Blood     (Negative)  


 


Urine Nitrite     (Negative)  


 


Ur Leukocyte Esterase     (Negative)  


 


Urine RBC     (0-5)  /hpf


 


Urine WBC     (0-5)  /hpf


 


Urine WBC Clumps     (None)  /hpf


 


Urine Bacteria     (None)  /hpf


 


Urine Mucus     (None)  /hpf








                      Microbiology - Last 24 Hours (Table)











 03/12/19 14:45 Urine Culture - Preliminary





 Urine,Catheterized 














Assessment and Plan


Plan: 





1.  Sepsis secondary to acute urinary tract infection due to chronic indwelling 

retana.  Continue IV Rocephin.  Urine culture is in progress.





2 Acute kidney failure with azotemia from ATN secondary to dehydration, 

underlying history CK D stage III   last creatinine 1.8 October 2018, and 3 

creatinine 3.17, CPK will be done to evaluate for rhabdomyolysis, and postvoid 

residual as well as renal ultrasound, consult with Dr. Zamudio nephrology, avoid 

nephrotoxins and hypotension.  Hold of Lasix secondary to dehydration, losartan 

is held, continue on amlodipine and hydralazine with parameters.





3 paroxysmal atrial fibrillation on Coumadin.  INR 3.1 hold Coumadin today as 

there are some concerns regarding hemorrhagic component in the pelvis.  Repeat 

INR tomorrow vitamin K2.5 5 milligrams 1 dose given in the emergency room





4 diabetes2 with diabetic neuropathy continue Lantus 30 units daily at bedtime 

with hold 10 units lispro with sliding scale at this time until diet by mouth 

has fully recovered, correctional scale insulin to be given, but hold Januvia 

secondary to elevated creatinine





5.  Suspected hemorrhagic bleed in the pelvis from CT imaging, Coumadin on hold,

consult with Dr. John garcia.  No plan for any intervention.  Will monitor 

hemoglobin and hold Coumadin for 3 days. 





5 hypertension   on metoprolol  hold Lasix hold hydrochlorothiazide and 

amiloride.  Hold lisinopril for hyper hyperkalemia





6 Hyperlipidemia hold statins until CPK would stabilize





7 Mild protein calorie malnutrition





8 Mild transaminitis with slight elevation of alkaline phosphatase, check for 

lipase CMP to be monitored





 DVT prophylaxis INR therapeutic hold Coumadin INR tomorrow





 GI prophylaxis Pepcid 20 mg by mouth daily





 CODE STATUS full code





Discharge plan: Return to [facility].  Family has brought up 

discussion on hospice with .





Impression and plan of care have been directed as dictated by the signing 

physician.  Candy Kumar nurse practitioner acting as scribe for signing 

physician.

## 2019-03-14 LAB
ANION GAP SERPL CALC-SCNC: 12 MMOL/L
BUN SERPL-SCNC: 83 MG/DL (ref 7–17)
CALCIUM SPEC-MCNC: 8.6 MG/DL (ref 8.4–10.2)
CHLORIDE SERPL-SCNC: 104 MMOL/L (ref 98–107)
CO2 SERPL-SCNC: 20 MMOL/L (ref 22–30)
GLUCOSE BLD-MCNC: 134 MG/DL (ref 75–99)
GLUCOSE BLD-MCNC: 146 MG/DL (ref 75–99)
GLUCOSE BLD-MCNC: 201 MG/DL (ref 75–99)
GLUCOSE BLD-MCNC: 231 MG/DL (ref 75–99)
GLUCOSE SERPL-MCNC: 120 MG/DL (ref 74–99)
INR PPP: 1.4 (ref ?–1.2)
POTASSIUM SERPL-SCNC: 3.7 MMOL/L (ref 3.5–5.1)
PT BLD: 14 SEC (ref 9–12)
SODIUM SERPL-SCNC: 136 MMOL/L (ref 137–145)

## 2019-03-14 RX ADMIN — CEFAZOLIN SCH MLS/HR: 330 INJECTION, POWDER, FOR SOLUTION INTRAMUSCULAR; INTRAVENOUS at 06:25

## 2019-03-14 RX ADMIN — SODIUM CHLORIDE SCH DROPS: 50 SOLUTION/ DROPS OPHTHALMIC at 22:01

## 2019-03-14 RX ADMIN — METOPROLOL TARTRATE SCH MG: 50 TABLET, FILM COATED ORAL at 21:52

## 2019-03-14 RX ADMIN — DOCUSATE SODIUM AND SENNOSIDES SCH EACH: 50; 8.6 TABLET ORAL at 10:19

## 2019-03-14 RX ADMIN — INSULIN DETEMIR SCH UNIT: 100 INJECTION, SOLUTION SUBCUTANEOUS at 21:52

## 2019-03-14 RX ADMIN — INSULIN ASPART SCH UNIT: 100 INJECTION, SOLUTION INTRAVENOUS; SUBCUTANEOUS at 17:23

## 2019-03-14 RX ADMIN — THERA TABS SCH EACH: TAB at 10:19

## 2019-03-14 RX ADMIN — ASPIRIN 81 MG CHEWABLE TABLET SCH MG: 81 TABLET CHEWABLE at 21:52

## 2019-03-14 RX ADMIN — INSULIN ASPART SCH: 100 INJECTION, SOLUTION INTRAVENOUS; SUBCUTANEOUS at 21:44

## 2019-03-14 RX ADMIN — LEVOTHYROXINE SODIUM SCH MCG: 50 TABLET ORAL at 06:25

## 2019-03-14 RX ADMIN — SODIUM CHLORIDE SCH DROPS: 50 SOLUTION/ DROPS OPHTHALMIC at 10:28

## 2019-03-14 RX ADMIN — INSULIN ASPART SCH UNIT: 100 INJECTION, SOLUTION INTRAVENOUS; SUBCUTANEOUS at 12:48

## 2019-03-14 RX ADMIN — CEFAZOLIN SCH MLS/HR: 330 INJECTION, POWDER, FOR SOLUTION INTRAMUSCULAR; INTRAVENOUS at 22:01

## 2019-03-14 RX ADMIN — INSULIN ASPART SCH: 100 INJECTION, SOLUTION INTRAVENOUS; SUBCUTANEOUS at 06:30

## 2019-03-14 RX ADMIN — METOPROLOL TARTRATE SCH MG: 50 TABLET, FILM COATED ORAL at 10:19

## 2019-03-14 NOTE — P.PN
Subjective


Progress Note Date: 03/14/19








This is an 84-year-old pleasant female followed by Dr. Pardo in [facility], known history off diabetes mellitus type 2 requiring insulin, peripheral 

neuropathy hypertension, hypothyroidism atrial fibrillation, admitted to 

[facility] via EMS transfer secondary to increasing dehy

dration.  Patient has had diminished appetite, over the past 4-5 days, no 

diarrhea, patient is on diuretics, for chronic dependent edema, heat she has had

increasing fatigue, weakness, patient cannot specify dysuria, however his 

recurrent low back pain, and has urinary incontinence occasional chills and 

myalgia. has nausea, bruises, chronic edema right leg more than left, chronic 

retana x 2 years





In the emergency room creatinine was 3.18,  she was subsequently admitted for 

acute kidney failure, and azotemia electrolyte abnormalities, as well as  with 

acute urinary tract infection  urinalysis leukocyte positive nitrate positive 

with WBC clumping computed tomography scan emergency room, right kidney is 

markedly atrophic, hypodense lesion extending off the left kidney stable from 

previous, perinephric edema noted, bladder was taken, 1 cm lesion upper pole 

left kidney no hydronephrosis noted, lung bases shows cardiomegaly with right 

lower lobe left lower lobe consolidation pleural effusion there is small amount 

of free fluid in the pelvis, hypodense could represent hemorrhagic component, 

they have recommended pelvic ultrasound to exclude adrenal adnexal mass also 

localized focal prominence of pancreatic body however this is difficult to 

address to have recommended MRI





3/13: Patient has been afebrile, heart rate in the 80s, blood pressure 118/64, 

pulse ox 96% on room air.  Repeat lab work shows a white count of 20.9, 

hemoglobin 8.9, platelet count 277, INR 1.5.  Sodium 134, potassium 3.8, 

chloride 101, CO2 20, BUN 97 creatinine 2.7.  Liver function tests are improved 

with AST of 44, ALT 50, alkaline phosphatase 163.  .  Retana catheter was 

changed in the ER.  Urine culture is in progress.  Patient has been seen by Dr. Anderson with no plan for any surgical intervention.   came back at 147.4.  

Coumadin is currently on hold.  Renal artery ultrasound ordered to rule out 

renal artery stenosis which will be done tomorrow.





3/14: Patient has been afebrile, heart rate in the 80s to 109, blood pressure 

132/74, pulse ox 99% on room air.  INR is 1.4, BUN 83 and creatinine 2.37, CO2 

20.  Blood sugars running between 120-162.  Renal artery Doppler was inadequate 

on the right kidney and right renal artery due to atrophy.  Elevated indices in 

the left kidney most likely secondary to medical renal disease.  Patient is 

followed by Dr. Zamudio with recommendations to decrease IV fluids to 50 mL per 

hour and hold Lasix for now.  Urine culture showing gram-negative bacilli and 

blood culture no growth after 24 hours.  Consult has been requested with GYN 

regarding concern for hemorrhage in the pelvis and elevated CA-125.  Patient has

had previous hysterectomy and bilateral oophorectomy.  Family questioned case 

manager about hospice care yesterday.  We will ask for hospice informational 

meeting but no plan to activate hospice at this point.





Review Of Systems:


Constitutional: No fever, no chills, no night sweats.  No weight change.  

Reports weakness, reports fatigue.  Reports daytime sleepiness.


EENT: No headache.  No blurred vision or double vision, no loss of vision.  No 

loss of Hearing, no ringing in the ears, no dizziness.  No nasal drainage or 

congestion.  No epistaxis.  No sore throat.


Lungs: No shortness of breath, cough, no sputum production.  No wheezing.


Cardiovascular: No chest pain, no lower extremity edema.  No palpitations.  No 

paroxysmal nocturnal dyspnea.  No orthopnea.  Reports lightheadedness or 

dizziness.  No syncopal episodes.


Abdominal: Reports abdominal pain.  Reports bloating, reports early satiety, No 

nausea, vomiting.  No diarrhea.  No constipation.  No bloody or tarry stools..  

No loss of appetite.


Genitourinary: No dysuria, increased frequency, urgency.  No urinary retention. 

Reports chronic Retana catheter


Musculoskeletal: No myalgias.  No muscle weakness, reports gait dysfunction, no 

frequent falls.  No back pain.  No neck pain.


Integumentary:  No rash or pruritus.  No unusual bruising.  No change in hair or

nails.


Neurologic: Reports weakness, reports loss of vision.


Psychiatric: No depression.  No anxiety.  No mood swings.


Endocrine: No abnormal blood sugars.  No weight change.  No excessive sweating 

or thirst.  No cold intolerance.  No weight change.











Objective





- Vital Signs


Vital signs: 


                                   Vital Signs











Temp  98.2 F   03/14/19 04:00


 


Pulse  94   03/14/19 08:15


 


Resp  20   03/14/19 08:15


 


BP  146/75   03/14/19 08:15


 


Pulse Ox  98   03/14/19 08:15








                                 Intake & Output











 03/13/19 03/14/19 03/14/19





 18:59 06:59 18:59


 


Intake Total 200  100


 


Output Total 1250 500 


 


Balance -1050 -500 100


 


Weight  82.5 kg 


 


Intake:   


 


  Oral 200  100


 


Output:   


 


  Urine 1250 500 


 


  Post Void Residual 0  


 


Other:   


 


  Voiding Method Indwelling Catheter Indwelling Catheter 


 


  # Bowel Movements 0  














- Exam





General appearance: cooperative, no acute distress, patient resting in bed





- EENT


Eyes: anicteric sclerae, EOMI, PERRLA, photophobia


ENT: NA/AT, normal oropharynx





- Neck


Neck: normal ROM





- Respiratory


Respiratory: bilateral: CTA, negative: diminished, dullness, rales





- Cardiovascular


Rhythm: regular


Heart sounds: normal: S1, S2


Abnormal Heart Sounds: no systolic murmur, no diastolic murmur, no rub, no S3 

Gallop, no S4 Gallop, no click, no other





- Gastrointestinal


General gastrointestinal: normal bowel sounds, soft





- Integumentary


Integumentary: decreased turgor, normal





- Neurologic


Neurologic: CNII-XII intact





- Musculoskeletal


Musculoskeletal: gait normal, strength equal bilaterally





- Psychiatric


Psychiatric: appropriate affect, intact judgment & insight








- Labs


CBC & Chem 7: 


                                 03/13/19 06:44





                                 03/14/19 06:13


Labs: 


                  Abnormal Lab Results - Last 24 Hours (Table)











  03/13/19 03/13/19 03/13/19 Range/Units





  06:44 16:36 21:09 


 


PT     (9.0-12.0)  sec


 


INR     (<1.2)  


 


Sodium     (137-145)  mmol/L


 


Carbon Dioxide     (22-30)  mmol/L


 


BUN     (7-17)  mg/dL


 


Creatinine     (0.52-1.04)  mg/dL


 


Glucose     (74-99)  mg/dL


 


POC Glucose (mg/dL)   191 H  162 H  (75-99)  mg/dL


 


Iron  21 L    ()  ug/dL


 


Iron Saturation  8.20 L    (12.00-45.00)   














  03/14/19 03/14/19 03/14/19 Range/Units





  05:59 06:13 06:13 


 


PT   14.0 H   (9.0-12.0)  sec


 


INR   1.4 H   (<1.2)  


 


Sodium    136 L  (137-145)  mmol/L


 


Carbon Dioxide    20 L  (22-30)  mmol/L


 


BUN    83 H  (7-17)  mg/dL


 


Creatinine    2.47 H  (0.52-1.04)  mg/dL


 


Glucose    120 H  (74-99)  mg/dL


 


POC Glucose (mg/dL)  134 H    (75-99)  mg/dL


 


Iron     ()  ug/dL


 


Iron Saturation     (12.00-45.00)   














  03/14/19 Range/Units





  11:32 


 


PT   (9.0-12.0)  sec


 


INR   (<1.2)  


 


Sodium   (137-145)  mmol/L


 


Carbon Dioxide   (22-30)  mmol/L


 


BUN   (7-17)  mg/dL


 


Creatinine   (0.52-1.04)  mg/dL


 


Glucose   (74-99)  mg/dL


 


POC Glucose (mg/dL)  146 H  (75-99)  mg/dL


 


Iron   ()  ug/dL


 


Iron Saturation   (12.00-45.00)   








                      Microbiology - Last 24 Hours (Table)











 03/12/19 14:45 Urine Culture - Preliminary





 Urine,Catheterized    Gram Neg Bacilli


 


 03/12/19 13:00 Blood Culture - Preliminary





 Blood    No Growth after 24 hours














Assessment and Plan


Plan: 





1.  Sepsis secondary to acute urinary tract infection due to chronic indwelling 

retana.  Continue IV Rocephin.  Urine culture is in progress.





2 Acute kidney failure with azotemia from ATN secondary to dehydration, 

underlying history CK D stage III   last creatinine 1.8 October 2018, and 3 

creatinine 3.17, CPK will be done to evaluate for rhabdomyolysis, and postvoid 

residual as well as renal ultrasound, consult with Dr. Zamudio nephrology, avoid 

nephrotoxins and hypotension.  Hold of Lasix secondary to dehydration, losartan 

is held, continue on amlodipine and hydralazine with parameters.





3 paroxysmal atrial fibrillation on Coumadin.  INR 3.1 hold Coumadin today as 

there are some concerns regarding hemorrhagic component in the pelvis.  Repeat 

INR tomorrow vitamin K2.5 5 milligrams 1 dose given in the emergency room





4 diabetes2 with diabetic neuropathy continue Lantus 30 units daily at bedtime 

with hold 10 units lispro with sliding scale at this time until diet by mouth 

has fully recovered, correctional scale insulin to be given, but hold Januvia 

secondary to elevated creatinine





5.  Suspected hemorrhagic bleed in the pelvis from CT imaging, Coumadin on hold,

consult with Dr. Jc garcia.  No plan for any intervention.  Plan to keep 

her Coumadin on hold.  Consult with GYN





5 hypertension   on metoprolol  hold Lasix hold hydrochlorothiazide and 

amiloride.  Hold lisinopril for hyper hyperkalemia





6 Hyperlipidemia hold statins until CPK would stabilize





7 Mild protein calorie malnutrition





8 Mild transaminitis with slight elevation of alkaline phosphatase, check for 

lipase CMP to be monitored





 DVT prophylaxis INR therapeutic hold Coumadin INR tomorrow





 GI prophylaxis Pepcid 20 mg by mouth daily





 CODE STATUS full code





Discharge plan: Return to [facility].  Hospice informational 

meeting requested the family would like to pursue.





Impression and plan of care have been directed as dictated by the signing 

physician.  Candy Kumar nurse practitioner acting as scribe for signing 

physician.

## 2019-03-14 NOTE — PN
PROGRESS NOTE



The patient is seen for followup for acute kidney injury.  She is currently maintained

on gentle IV hydration.  Renal function has improved with creatinine going down from

3.1 to 2.47 today.  The patient has a chronic indwelling Perez catheter.  She has had

good urine output of about 1.7 L over 24 hours.



PHYSICAL EXAMINATION:

Blood pressure was 132/74, heart rate 109 per minute.  Patient is afebrile.

Examination of the heart S1, S2.  Examination of the lungs bilateral breath sounds are

heard.  Abdomen is soft, nontender, obese.  Examination of the lower extremities shows

edema trace bilaterally.  Chronic skin changes are noted.  CNS exam is grossly intact.



LAB:

Shows sodium 136, potassium 3.7, BUN 83, serum creatinine 2.47.



ASSESSMENT:

1. Acute kidney injury, acute tubular necrosis, currently nonoliguric.  There was a

    component of prerenal acute renal failure.  The patient does have solitary kidney.

    She has right renal atrophy on the CAT scan.  She is not on any nephrotoxic agents

    at this point.  I will continue with the gentle IV hydration at 50 mL an hour.

2. Chronic kidney disease stage 3 with previous creatinine 1.28 in November 2018,

    etiology is diabetic nephropathy and nephrosclerosis.  Urinalysis did show evidence

    of proteinuria.

3. Urinary tract infection maintained on antibiotics.

4. History of atrial fibrillation.

5. Dyslipidemia.



PLAN:

Continue empiric antibiotics.  Continue IV fluids at 50 mL an hour.  Repeat labs in

a.m.  Encourage increased oral intake.





MMODL / IJN: [id number] / Job#: [id number]

## 2019-03-14 NOTE — US
EXAMINATION TYPE: US renal artery duplex complet

 

DATE OF EXAM: 3/14/2019

 

COMPARISON: CT 3/12/2019

 

CLINICAL HISTORY: 84-year-old female HTN, ALBARO; ARF, anemia,UTI; right atrophic kidney per CT

 

Technique: Multiple sonographic images of the kidneys were obtained. Color Doppler and spectral wavef
orm analysis of the renal arteries.

 

FINDINGS:

US exam is technically limited for images due to large body habitus and limited patient ability to pe
rform breath-hold maneuver for PW Doppler and Color flow assessment.

 

MEASUREMENTS:

 

RENAL SIZE:

Rt Kidney: 8.2 x 4.2 x 4.2cm

Lt Kidney: 13.6 x 7.0 x 7.0cm

 

RESISTANCE INDEX

Right:  NA due to above limitations

Left: 0.98

 

RA/AO RATIO (< 3.5 )

Right: NA due to above limitations

Left: 1.6

 

RA VELOCITY ( < 180 cm/s)

Right:NA

Left: 74.5 distally

 

 

Right kidney: atrophic with lobular borders as per CT. Limited color flow seen in right kidney. 

 

Left kidney: couple of renal cysts seen in lower pole with larger cyst =3.4 x 2.6x 2.7cm. Abnormal RI
 in acuate arteries left kidney as measured greater than 0.8. 

 

Aorta size is wnl. Scattered intimal wall thickening is noted. Overlying bowel gas results in limited
 organ visualization.

 

 

IMPRESSION:

 

1. Technical and patient limitations. This results in inadequate Doppler assessment of the right kidn
ey and right renal artery. The kidney, however, is atrophic/atretic.

2. Elevated resistive indices in the left kidney which can be seen in entities such as ureteral obstr
uction, renal vein thrombosis, and medical renal disease. With obstruction or underlying medical dung
l disease. The lack of hydronephrosis argues against obstruction. Medical renal disease is favored. R
enal vein Doppler can be attempted to exclude renal vein thrombosis.

## 2019-03-14 NOTE — P.PN
<Rosalva Torres LAUREL - Last Filed: 03/14/19 12:17>





Subjective


Progress Note Date: 03/14/19





CHIEF COMPLAINT: possible pelvis hemorrhage





HISTORY OF PRESENT ILLNESS: Patient examined at the bedside. No family present. 

Patient denies abdominal pain. Denies nausea and vomiting. Tolerating heart 

healthy diet. No repeat cbc today. Vital signs are stable.





PHYSICAL EXAM: 


VITAL SIGNS: Currently stable.


GENERAL: Well-developed in no acute distress. 


HEENT:  No sclera icterus. Extraocular movements grossly intact.  Moist buccal 

mucosa. 


Head is atraumatic, normocephalic. Hears conversational speech. No nasal 

drainage.


NECK:  Supple without lymphadenopathy.


CHEST:  Non-labored respirations and equal bilateral excursions. 


CARDIOVASCULAR:  Regular rate with regular rhythm.  Palpable 2+ radial pulses.


ABDOMEN:  Soft.  Nondistended. Nontender. Positive bowel sounds.


MUSCULOSKELETAL:  No clubbing, cyanosis or edema.


NEUROLOGIC:  No focal or lateralizing signs.  Cranial nerves II through XII 

grossly intact.


PSYCH:  Appropriate affect.  Alert and oriented to person.


SKIN: Well perfused.  Good skin turgor. 





ASSESSMENT: 


1.  Hyperdense region in pelvis which may represent a hemorrhagic component per 

CT, patient without drop in hemoglobin and hemodynamically stable


2.  Elevated CA-125, history of hysterectomy including oophorectomy


3.  Leukocytosis





PLAN: 


No surgical intervention at this time. Patient is stable from a surgical 

perspective. She may resume Coumadin from our standpoint. Will defer to 

medicine. Repeat CBC in AM. We will sign off. Please re-consult if needed.





Nurse practitioner note has been reviewed by physician. Signing provider agrees 

with the documented findings, assessment, and plan of care. 








Objective





- Vital Signs


Vital signs: 


                                   Vital Signs











Temp  98.2 F   03/14/19 04:00


 


Pulse  94   03/14/19 08:15


 


Resp  20   03/14/19 08:15


 


BP  146/75   03/14/19 08:15


 


Pulse Ox  98   03/14/19 08:15








                                 Intake & Output











 03/13/19 03/14/19 03/14/19





 18:59 06:59 18:59


 


Intake Total 200  100


 


Output Total 1250 500 


 


Balance -1050 -500 100


 


Weight  82.5 kg 


 


Intake:   


 


  Oral 200  100


 


Output:   


 


  Urine 1250 500 


 


  Post Void Residual 0  


 


Other:   


 


  Voiding Method Indwelling Catheter Indwelling Catheter 


 


  # Bowel Movements 0  














- Labs


CBC & Chem 7: 


                                 03/13/19 06:44





                                 03/14/19 06:13


Labs: 


                  Abnormal Lab Results - Last 24 Hours (Table)











  03/13/19 03/13/19 03/13/19 Range/Units





  06:44 16:36 21:09 


 


PT     (9.0-12.0)  sec


 


INR     (<1.2)  


 


Sodium     (137-145)  mmol/L


 


Carbon Dioxide     (22-30)  mmol/L


 


BUN     (7-17)  mg/dL


 


Creatinine     (0.52-1.04)  mg/dL


 


Glucose     (74-99)  mg/dL


 


POC Glucose (mg/dL)   191 H  162 H  (75-99)  mg/dL


 


Iron  21 L    ()  ug/dL


 


Iron Saturation  8.20 L    (12.00-45.00)   














  03/14/19 03/14/19 03/14/19 Range/Units





  05:59 06:13 06:13 


 


PT   14.0 H   (9.0-12.0)  sec


 


INR   1.4 H   (<1.2)  


 


Sodium    136 L  (137-145)  mmol/L


 


Carbon Dioxide    20 L  (22-30)  mmol/L


 


BUN    83 H  (7-17)  mg/dL


 


Creatinine    2.47 H  (0.52-1.04)  mg/dL


 


Glucose    120 H  (74-99)  mg/dL


 


POC Glucose (mg/dL)  134 H    (75-99)  mg/dL


 


Iron     ()  ug/dL


 


Iron Saturation     (12.00-45.00)   














  03/14/19 Range/Units





  11:32 


 


PT   (9.0-12.0)  sec


 


INR   (<1.2)  


 


Sodium   (137-145)  mmol/L


 


Carbon Dioxide   (22-30)  mmol/L


 


BUN   (7-17)  mg/dL


 


Creatinine   (0.52-1.04)  mg/dL


 


Glucose   (74-99)  mg/dL


 


POC Glucose (mg/dL)  146 H  (75-99)  mg/dL


 


Iron   ()  ug/dL


 


Iron Saturation   (12.00-45.00)   








                      Microbiology - Last 24 Hours (Table)











 03/12/19 14:45 Urine Culture - Preliminary





 Urine,Catheterized    Gram Neg Bacilli


 


 03/12/19 13:00 Blood Culture - Preliminary





 Blood    No Growth after 24 hours














<Yazan Anderson - Last Filed: 03/14/19 18:34>





Subjective





Agree with above.  Patient doing well clinically.  She is afebrile.  Denies a

bdominal pain.  We will sign off.  Call if needed.





Objective





- Vital Signs


Vital signs: 


                                   Vital Signs











Temp  99.1 F   03/14/19 16:00


 


Pulse  100   03/14/19 16:00


 


Resp  20   03/14/19 16:00


 


BP  131/65   03/14/19 16:00


 


Pulse Ox  100   03/14/19 16:00








                                 Intake & Output











 03/13/19 03/14/19 03/14/19





 18:59 06:59 18:59


 


Intake Total 200  220


 


Output Total 0882 261 0176


 


Balance -1050 -500 -830


 


Weight  82.5 kg 


 


Intake:   


 


  Oral 200  220


 


Output:   


 


  Urine 1435 146 2549


 


  Post Void Residual 0  


 


Other:   


 


  Voiding Method Indwelling Catheter Indwelling Catheter 


 


  # Bowel Movements 0  0














- Labs


CBC & Chem 7: 


                                 03/13/19 06:44





                                 03/14/19 06:13


Labs: 


                  Abnormal Lab Results - Last 24 Hours (Table)











  03/13/19 03/13/19 03/14/19 Range/Units





  06:44 21:09 05:59 


 


PT     (9.0-12.0)  sec


 


INR     (<1.2)  


 


Sodium     (137-145)  mmol/L


 


Carbon Dioxide     (22-30)  mmol/L


 


BUN     (7-17)  mg/dL


 


Creatinine     (0.52-1.04)  mg/dL


 


Glucose     (74-99)  mg/dL


 


POC Glucose (mg/dL)   162 H  134 H  (75-99)  mg/dL


 


Iron  21 L    ()  ug/dL


 


Iron Saturation  8.20 L    (12.00-45.00)   














  03/14/19 03/14/19 03/14/19 Range/Units





  06:13 06:13 11:32 


 


PT  14.0 H    (9.0-12.0)  sec


 


INR  1.4 H    (<1.2)  


 


Sodium   136 L   (137-145)  mmol/L


 


Carbon Dioxide   20 L   (22-30)  mmol/L


 


BUN   83 H   (7-17)  mg/dL


 


Creatinine   2.47 H   (0.52-1.04)  mg/dL


 


Glucose   120 H   (74-99)  mg/dL


 


POC Glucose (mg/dL)    146 H  (75-99)  mg/dL


 


Iron     ()  ug/dL


 


Iron Saturation     (12.00-45.00)   














  03/14/19 Range/Units





  16:42 


 


PT   (9.0-12.0)  sec


 


INR   (<1.2)  


 


Sodium   (137-145)  mmol/L


 


Carbon Dioxide   (22-30)  mmol/L


 


BUN   (7-17)  mg/dL


 


Creatinine   (0.52-1.04)  mg/dL


 


Glucose   (74-99)  mg/dL


 


POC Glucose (mg/dL)  201 H  (75-99)  mg/dL


 


Iron   ()  ug/dL


 


Iron Saturation   (12.00-45.00)   








                      Microbiology - Last 24 Hours (Table)











 03/12/19 13:00 Blood Culture - Preliminary





 Blood    No Growth after 48 hours


 


 03/12/19 14:45 Urine Culture - Preliminary





 Urine,Catheterized    Gram Neg Bacilli

## 2019-03-15 VITALS
RESPIRATION RATE: 20 BRPM | DIASTOLIC BLOOD PRESSURE: 73 MMHG | SYSTOLIC BLOOD PRESSURE: 136 MMHG | HEART RATE: 83 BPM | TEMPERATURE: 97.1 F

## 2019-03-15 LAB
ANION GAP SERPL CALC-SCNC: 10 MMOL/L
BUN SERPL-SCNC: 69 MG/DL (ref 7–17)
CALCIUM SPEC-MCNC: 8.7 MG/DL (ref 8.4–10.2)
CELLS COUNTED: 200
CHLORIDE SERPL-SCNC: 107 MMOL/L (ref 98–107)
CO2 SERPL-SCNC: 21 MMOL/L (ref 22–30)
EOSINOPHIL # BLD MANUAL: 0.34 K/UL (ref 0–0.7)
ERYTHROCYTE [DISTWIDTH] IN BLOOD BY AUTOMATED COUNT: 3.35 M/UL (ref 3.8–5.4)
ERYTHROCYTE [DISTWIDTH] IN BLOOD: 16.8 % (ref 11.5–15.5)
GLUCOSE BLD-MCNC: 64 MG/DL (ref 75–99)
GLUCOSE BLD-MCNC: 65 MG/DL (ref 75–99)
GLUCOSE BLD-MCNC: 70 MG/DL (ref 75–99)
GLUCOSE BLD-MCNC: 73 MG/DL (ref 75–99)
GLUCOSE SERPL-MCNC: 76 MG/DL (ref 74–99)
HCT VFR BLD AUTO: 28.8 % (ref 34–46)
HGB BLD-MCNC: 8.9 GM/DL (ref 11.4–16)
INR PPP: 1.7 (ref ?–1.2)
LYMPHOCYTES # BLD MANUAL: 1.86 K/UL (ref 1–4.8)
MCH RBC QN AUTO: 26.6 PG (ref 25–35)
MCHC RBC AUTO-ENTMCNC: 31 G/DL (ref 31–37)
MCV RBC AUTO: 85.9 FL (ref 80–100)
METAMYELOCYTES # BLD: 0.17 K/UL
MONOCYTES # BLD MANUAL: 1.86 K/UL (ref 0–1)
MYELOCYTES # BLD MANUAL: 0.34 K/UL
NEUTROPHILS NFR BLD MANUAL: 71 %
NEUTS SEG # BLD MANUAL: 12.5 K/UL (ref 1.3–7.7)
PLATELET # BLD AUTO: 317 K/UL (ref 150–450)
POTASSIUM SERPL-SCNC: 3.9 MMOL/L (ref 3.5–5.1)
PT BLD: 17.1 SEC (ref 9–12)
SODIUM SERPL-SCNC: 138 MMOL/L (ref 137–145)
WBC # BLD AUTO: 16.9 K/UL (ref 3.8–10.6)

## 2019-03-15 RX ADMIN — INSULIN ASPART SCH: 100 INJECTION, SOLUTION INTRAVENOUS; SUBCUTANEOUS at 12:17

## 2019-03-15 RX ADMIN — DOCUSATE SODIUM AND SENNOSIDES SCH EACH: 50; 8.6 TABLET ORAL at 09:54

## 2019-03-15 RX ADMIN — SODIUM CHLORIDE SCH DROPS: 50 SOLUTION/ DROPS OPHTHALMIC at 09:54

## 2019-03-15 RX ADMIN — LEVOTHYROXINE SODIUM SCH MCG: 50 TABLET ORAL at 06:27

## 2019-03-15 RX ADMIN — METOPROLOL TARTRATE SCH MG: 50 TABLET, FILM COATED ORAL at 09:54

## 2019-03-15 RX ADMIN — THERA TABS SCH EACH: TAB at 09:54

## 2019-03-15 RX ADMIN — ACETAMINOPHEN PRN MG: 325 TABLET, FILM COATED ORAL at 06:29

## 2019-03-15 RX ADMIN — INSULIN ASPART SCH: 100 INJECTION, SOLUTION INTRAVENOUS; SUBCUTANEOUS at 06:19

## 2019-03-15 NOTE — P.DS
Providers


Date of admission: 


03/12/19 16:08





Expected date of discharge: 03/15/19


Attending physician: 


Radha Valdez





Consults: 





                                        





03/12/19 16:09


Consult Physician Routine 


   Consulting Provider: Flor Zamudio


   Consult Reason/Comments: ARF


   Do you want consulting provider notified?: Yes





03/12/19 17:02


Consult Physician Routine 


   Consulting Provider: Yazan Anderson


   Consult Reason/Comments: hemmorhage pelvis poss


   Do you want consulting provider notified?: Yes





03/13/19 17:03


Consult Physician Routine 


   Consulting Provider: Vijaya Melo


   Consult Reason/Comments: CT showed free FL in the abm


   Do you want consulting provider notified?: Yes











Primary care physician: 


Azalea Pardo





[facility] Course: 





This is an 84-year-old pleasant female followed by Dr. Pardo in [facility], known history off diabetes mellitus type 2 requiring insulin, peripheral 

neuropathy hypertension, hypothyroidism atrial fibrillation, admitted to 

[facility] via EMS transfer secondary to increasing 

dehydration.  Patient has had diminished appetite, over the past 4-5 days, no 

diarrhea, patient is on diuretics, for chronic dependent edema, heat she has had

increasing fatigue, weakness, patient cannot specify dysuria, however his 

recurrent low back pain, and has urinary incontinence occasional chills and 

myalgia. has nausea, bruises, chronic edema right leg more than left, chronic 

retana x 2 years





In the emergency room creatinine was 3.18,  she was subsequently admitted for 

acute kidney failure, and azotemia electrolyte abnormalities, as well as  with 

acute urinary tract infection  urinalysis leukocyte positive nitrate positive 

with WBC clumping computed tomography scan emergency room, right kidney is 

markedly atrophic, hypodense lesion extending off the left kidney stable from p

revious, perinephric edema noted, bladder was taken, 1 cm lesion upper pole left

kidney no hydronephrosis noted, lung bases shows cardiomegaly with right lower 

lobe left lower lobe consolidation pleural effusion there is small amount of 

free fluid in the pelvis, hypodense could represent hemorrhagic component, they 

have recommended pelvic ultrasound to exclude adrenal adnexal mass also 

localized focal prominence of pancreatic body however this is difficult to 

address to have recommended MRI





3/13: Patient has been afebrile, heart rate in the 80s, blood pressure 118/64, 

pulse ox 96% on room air.  Repeat lab work shows a white count of 20.9, 

hemoglobin 8.9, platelet count 277, INR 1.5.  Sodium 134, potassium 3.8, 

chloride 101, CO2 20, BUN 97 creatinine 2.7.  Liver function tests are improved 

with AST of 44, ALT 50, alkaline phosphatase 163.  .  Retana catheter was 

changed in the ER.  Urine culture is in progress.  Patient has been seen by Dr. Anderson with no plan for any surgical intervention.   came back at 147.4.  

Coumadin is currently on hold.  Renal artery ultrasound ordered to rule out 

renal artery stenosis which will be done tomorrow.





3/14: Patient has been afebrile, heart rate in the 80s to 109, blood pressure 

132/74, pulse ox 99% on room air.  INR is 1.4, BUN 83 and creatinine 2.37, CO2 

20.  Blood sugars running between 120-162.  Renal artery Doppler was inadequate 

on the right kidney and right renal artery due to atrophy.  Elevated indices in 

the left kidney most likely secondary to medical renal disease.  Patient is 

followed by Dr. Zamudio with recommendations to decrease IV fluids to 50 mL per 

hour and hold Lasix for now.  Urine culture showing gram-negative bacilli and 

blood culture no growth after 24 hours.  Consult has been requested with GYN 

regarding concern for hemorrhage in the pelvis and elevated CA-125.  Patient has

had previous hysterectomy and bilateral oophorectomy.  Family questioned case 

manager about hospice care yesterday.  We will ask for hospice informational 

meeting but no plan to activate hospice at this point.





3/15: Patient has been afebrile, heart rate running in the 80s, blood pressure 

127/66, pulse ox 96% on room air.  White count is 16.9, hemoglobin 8.9, platelet

count 317.  INR is 1.7.  His blood sugars have been running in the low 200s 

yesterday afternoon and evening and this morning at 65. Urine culture has been 

finalized with Klebsiella and Providencia susceptible to Rocephin.  Consult 

placed with Dr. Ross and he has recommended Rocephin to continue for 1 week.  

Midline will be ordered.  Blood culture showing no growth after 48 hours.  A 

patient has been seen by Dr. Melo and repeat pelvic ultrasound was ordered 

which revealed diffuse shadowing from the pubic bone and possibly additionally 

from air within the urinary bladder as patient has a Retana catheter grading 

shadowing that was questioned to be mass on prior ultrasound.  No pelvic mass 

seen.  Small amount of free fluid within the pelvis as seen on the prior CAT 

scan.  No further workup is needed from Dr. Melo.  She also feels that elevated

CA-125 is related to patient's pleural effusion.





Discharge diagnoses:


1.  Sepsis secondary to Klebsiella and Providencia acute urinary tract infection

due to chronic indwelling retana.  


2.  Acute kidney failure with ATN secondary to dehydration, underlying history 

CKD stage III  


3.  Paroxysmal atrial fibrillation 


4.  Diabetes2 with diabetic neuropathy 


5.  Suspected hemorrhagic bleed in the pelvis, ruled out


6.  Hypertension 


7.  Hyperlipidemia 


8.  Mild protein calorie malnutrition


9.  Mild transaminitis with slight elevation of alkaline phosphatas





Discharge plan: Return to [facility]. 





Impression and plan of care have been directed as dictated by the signing 

physician.  Candy Kumar nurse practitioner acting as scribe for signing 

physician.








Patient Condition at Discharge: Good





Plan - Discharge Summary


New Discharge Prescriptions: 


New


   cefTRIAXone [Rocephin] 1 gm IVPB Q24HR #7 vial


   Insulin Detemir (Levemir) [Levemir] 20 unit SQ HS  syr





Continue


   Aspirin 81 mg PO HS


   Pravastatin Sodium [Pravachol] 80 mg PO HS


   Levothyroxine Sodium [Synthroid] 50 mcg PO QAM


   Sennosides-Docusate Sodium [Senokot-S] 1 tab PO DAILY


   Sodium Chloride 5% Ophth Soln [Vahid 128] 1 drop BOTH EYES BID


   Multivitamins, Thera [Multivitamin (formulary)] 1 tab PO DAILY


   Potassium Chloride ER [K-Dur 10] 10 meq PO DAILY


   Furosemide [Lasix] 40 mg PO DAILY #0


   Amino Acids/Protein Hydrolys [Pro-Stat Supplement] 30 ml PO DAILY


   amLODIPine BESYLATE 5 mg PO DAILY


   Spironolactone [Aldactone] 12.5 mg PO DAILY


   hydrALAZINE HCL [Apresoline] 100 mg PO TID


   Warfarin [Coumadin] 2.5 mg PO HS


   Metoprolol Tartrate [Lopressor] 100 mg PO BID


   Losartan Potassium 100 mg PO DAILY


   sitaGLIPtin PHOSPHATE [Januvia] 50 mg PO HS


   Cranberry 450mg 1 tab PO DAILY





Changed


   INSULIN LISPRO (humaLOG) [humaLOG] 6 units SQ AC-TID #0





Discontinued


   Insulin Glargine [Lantus] 26 unit SQ HS


Discharge Medication List





Aspirin 81 mg PO HS 02/08/16 [History]


Levothyroxine Sodium [Synthroid] 50 mcg PO QAM 02/08/16 [History]


Pravastatin Sodium [Pravachol] 80 mg PO HS 02/08/16 [History]


Sennosides-Docusate Sodium [Senokot-S] 1 tab PO DAILY 11/13/17 [History]


Multivitamins, Thera [Multivitamin (formulary)] 1 tab PO DAILY 10/01/18 

[History]


Potassium Chloride ER [K-Dur 10] 10 meq PO DAILY 10/01/18 [History]


Sodium Chloride 5% Ophth Soln [Vahid 128] 1 drop BOTH EYES BID 10/01/18 [History]


Furosemide [Lasix] 40 mg PO DAILY #0 10/05/18 [Rx]


Amino Acids/Protein Hydrolys [Pro-Stat Supplement] 30 ml PO DAILY 11/07/18 

[History]


amLODIPine BESYLATE 5 mg PO DAILY 11/07/18 [History]


Spironolactone [Aldactone] 12.5 mg PO DAILY 11/28/18 [History]


Cranberry 450mg 1 tab PO DAILY 03/12/19 [History]


Losartan Potassium 100 mg PO DAILY 03/12/19 [History]


Metoprolol Tartrate [Lopressor] 100 mg PO BID 03/12/19 [History]


Warfarin [Coumadin] 2.5 mg PO HS 03/12/19 [History]


hydrALAZINE HCL [Apresoline] 100 mg PO TID 03/12/19 [History]


sitaGLIPtin PHOSPHATE [Januvia] 50 mg PO HS 03/12/19 [History]


INSULIN LISPRO (humaLOG) [humaLOG] 6 units SQ AC-TID #0 03/15/19 [Rx]


Insulin Detemir (Levemir) [Levemir] 20 unit SQ HS  syr 03/15/19 [Rx]


cefTRIAXone [Rocephin] 1 gm IVPB Q24HR #7 vial 03/15/19 [Rx]








Follow up Appointment(s)/Referral(s): 


Azalea Pardo MD [Primary Care Provider] - 1-2 days


[facility], [NON-STAFF] - 1 Week


Ambulatory/Diagnostic Orders: 


Basic Metabolic Panel [LAB.AMB] Location: None Selected


Complete Blood Count w/diff [LAB.AMB] Location: None Selected


Activity/Diet/Wound Care/Special Instructions: 


[facility]


Discharge Disposition: TRANSFER TO SNF/ECF

## 2019-03-15 NOTE — P.CON
Consult Note





- .


Consult date: 03/15/19


Assessment/Plan:: 





This is an 84-year-old white female  3 para 3003 status post hysterectomy

many years ago for cervical cancer.  Patient is a resident at [facility], and 

was admitted 3 days ago with acute renal failure, nausea with vomiting, and l

ower abdominal pain.  Patient states her last bowel movement was 9 days ago.  

The abdominal pain has improved since her admission here.  She no longer is 

experiencing nausea or emesis.  Computed tomography scan of the abdomen and 

pelvis was performed along with pelvic ultrasound.  Computed tomography scan is 

essentially negative for the pelvis, however does reveal pleural effusions 

bilaterally.  Ultrasound however suggests an 11.9 x 7.5 x 5.9 cm left adnexal 

area of shadowing.  I have discussed this with the radiologist Dr. Treadwell.  It is

our feeling that this could likely be consistent with bowel shadowing.  I have 

ordered a repeat ultrasound this morning, with attention to turning of the 

transducer to see if the area elongates.





Past medical history is extensive, and includes chronic atrial fibrillation, 

chronic Perez catheter, type 2 diabetes, hyperlipidemia, hypothyroidism, 

hypertension.  Patient also has a history of cervical cancer status post 

complete hysterectomy and likely pelvic radition. 





Past surgical history is significant for tonsillectomy as a child, hysterectomy,

right leg bypass, and cataract removal.





Past obstetric history is significant for normal spontaneous vaginal deliveries 

3, 8 pound infants, all healthy.





Home medications including hydralazine, amino acid supplement, Senokot, 

Coumadin, Humalog insulin, losartin, Synthroid, spironolactone, metoprolol, 

protocol, Lantus, Lasix, Norvasc, baby aspirin, multivitamin daily, and 

potassium supplement.





ALLERGIES none known.





Social history patient is , she has never been a smoker, she denies 

alcohol or drug use.  As stated she is a resident at [facility].  Her power

of  is her grandson Mau.





Family history is noncontributory.





On exam this is a pleasant elderly female who is hard of hearing and a poor 

historian.  She is 5 foot 4 inches, 94.5 kg, current vital signs include 

temperature 97.8, blood pressure 127/66, pulse 86, respirations 21, 96% O2 

saturation on room air.  Patient has poor dentition.  No obvious thyromegaly.  

Breasts are atrophic, pendulous, with no obvious masses or lesions.  No nipple 

deviation or skin changes.  Abdomen is softly distended, somewhat hypertympanic,

active bowel sounds, no obvious abdominal masses.  No CVA tenderness.  Cardiac 

exam reveals regular rate and rhythm with no murmur click or rub.  Chest 

revealed diminished air exchange bilaterally, no obvious rales or rhonchi.  

Extremities reveal decreased peripheral pulses, +1 edema bilaterally.





On pelvic exam the external genitalia is age appropriate and atrophic.  The 

vaginal vault is very short, no obvious masses at the vaginal apex.  No vaginal 

bleeding.  There are no masses noted in the pelvis to do pelvic examination, 

however patient is uncomfortable to the exam and it is somewhat limited.





Admitting , decreased now to 84.  Admitting creatinine 3.18, decreased to

2.47 currently.  Urine culture positive for gram-negative bacilli, patient 

currently on IV Rocephin.  CA-125 elevated at 147.





Impression: Acute renal failure, urinary tract infection, anemia.  Multiple 

medical problems noted.  Computed tomography scan of the pelvis negative, 

ultrasound suggesting possible area of shadowing in the left adnexal region 

measuring 11.9 x 7.5 x 5.9 cm.  Again after discussion with radiologist, we 

suspect that this could be consistent with bowel shadowing.  Physical exa

mination of the pelvis this morning is negative. 





Plan: Awaiting repeat ultrasound with turning of the transducer to see if this 

area elongate and might be consistent with bowel shadowing.  CA-125 elevation is

of course a nonspecific test, which can be elevated with many benign conditions 

including pleural effusion as demonstrated on this patient's computed tomography

scan.  I am not necessarily concerned that this is consistent with a pelvic 

malignancy.  Thank you for the consultation, will discuss with attending 

physician after completion of follow-up ultrasound.

## 2019-03-15 NOTE — US
EXAMINATION TYPE: US pelvic complete

 

DATE OF EXAM: 3/15/2019

 

COMPARISON: Pelvic ultrasound dated 3/12/2019 and CT dated 3/12/2019

 

CLINICAL HISTORY: repeat abd pelvis us bowel vs other etiology. hysterectomy but patient unsure if ov
kait remain, reassess mass versus bowel in lt adnexa

 

TECHNIQUE:  TA.  Transabdominal sonographic images of the pelvis were acquired.  

 

Date of LMP:  hysterectomy

 

EXAM MEASUREMENTS:

 

Uterus:  Surgically absent 

Endometrial Stripe: Surgically absent 

Right Ovary:  atrophy versus surgically absent

Left Ovary:  atrophy versus surgically absent 

 

 

 

1. Uterus: Surgically absent

2. Endometrium:  Surgically absent

3. Right Ovary:  not seen due to atrophy versus surgical removal

4. Left Ovary:  not seen due to atrophy versus surgical removal

5. Bilateral Adnexa:  Left adnexal area seen previously is slightly recreated at area of symphysis jamil
ne, no focal mass lesion noted by scan today

6. Posterior cul-de-sac:  wnl

 

**Some free fluid was noted underneath peristalsing bowel midline/right pelvis

 

**patient had retana clamped for over an hour and no discernable bladder seen, as with previous study.
 

 

IMPRESSION: 

1. There is diffuse shadowing from the pubic bone and possibly additionally from air within the urina
ry bladder as this patient has a Retana catheter creating shadowing that was questioned to be a mass o
n the prior ultrasound. No pelvic mass is seen. Finding was communicated with Dr. Melo.

2. Small amount of free fluid within the pelvis as seen on the prior CT. This appears dependent and n
onloculated.

## 2019-03-15 NOTE — P.CON
Consult Note





- .


Consult date: 03/15/19


Assessment/Plan:: 





This is an 84-year-old  female patient known to ID service as she was 

seen in the past for right foot ulcer as well as sepsis from urinary tract 

infection.  Patient currently resides at [facility] and is 

wheelchair bound.  She is known to have severe peripheral artery disease status 

post to right lower extremity bypasses by Dr. Tsai, history of critical limb

ischemia and nonhealing ulcer of right.  Patient has a chronic Perez catheter in

place.  Patient was brought in to [facility] emergency center 

due to decreased appetite with decreased oral intake for the past 4-5 days.  No 

diarrhea.  Patient is also on diuretics and was having increasing fatigue, 

weakness, low back pain.  She was found to have a creatinine of 3.18 and 

urinalysis was positive for leukoesterase, nitrates and a BBC clumping.  CT of 

the abdomen and pelvis showed right kidney markedly atrophy, hypodense lesion 

extending off the left kidney stable, perinephric edema noted.  No 

hydronephrosis.  Small amount of free fluid in the pelvis and hypodensity could 

represent hemorrhagic component.  Pelvic ultrasound was done.  Patient was seen 

in consultation by Dr. Anderson and acute abdomen was ruled out.  Patient was also 

seen by Dr. Melo and repeat pelvic ultrasound was ordered which revealed 

diffuse shadowing from the pubic bone and possibly additionally from air within 

the urinary bladder as patient has a Perez catheter grading shadowing that was 

questioned to be mass on prior ultrasound.  No pelvic mass seen.  Small amount 

of free fluid within the pelvis as seen on the prior CAT scan.  No further 

workup is needed from Dr. Melo.  She also feels that elevated CA-125 is related

to patient's pleural effusion.  The patient is to be resumed back on Coumadin 

for her atrial fibrillation.  Patient has been afebrile, heart rate running in 

the 80s, blood pressure 127/66, pulse ox 96% on room air.  White count is 16.9, 

hemoglobin 8.9, platelet count 317.  INR is 1.7.   Urine culture has been 

finalized with Klebsiella and Providencia susceptible to Rocephin and thus this 

consult was requested.Please see the consult note as dictated by nurse 

practitioner Mrs. Candy Kumar.


This 84-year-old woman is well known to infectious disease service presents to 

Hospital from the [facility] where she was having a change of her 

status in that she stopped eating for several days appetite was poor and became 

very fatigued and generalized weakness.  There was evidence of urinary tract 

infection and acute renal failure.  She is known about hydration and the renal 

failure is improved but there is no evidence of a polymicrobial gram-negative 

urinary tract infection that is not amenable to oral therapy and the consult was

requested.  There was concern for a possible pelvic mass but has been seen by 

gynecology and no evidence of any significant pelvic mass was found.  The 

patient will have IV access placed and will be transferred to the [facility] care 

[facility] today to complete 7 days of Rocephin for her Providencia and Klebsiella

urinary infection.  This is complicated because of her chronic urinary retention

and chronic catheter.  Catheter is changed every 4 weeks and ensure proper bag 

placement to prevent reflux from the catheter bag into the bladder.  I agree 

with evaluation, assessment and plan as dictated by nurse practitioner Mrs. Candy Kumar.

## 2019-08-28 ENCOUNTER — HOSPITAL ENCOUNTER (INPATIENT)
Dept: HOSPITAL 47 - EC | Age: 84
LOS: 6 days | Discharge: NURSING, REHAB OR LONG-TERM CARE | DRG: 240 | End: 2019-09-03
Attending: INTERNAL MEDICINE | Admitting: INTERNAL MEDICINE
Payer: MEDICARE

## 2019-08-28 VITALS — BODY MASS INDEX: 32.8 KG/M2

## 2019-08-28 DIAGNOSIS — Z96.1: ICD-10-CM

## 2019-08-28 DIAGNOSIS — E66.9: ICD-10-CM

## 2019-08-28 DIAGNOSIS — M19.041: ICD-10-CM

## 2019-08-28 DIAGNOSIS — I08.3: ICD-10-CM

## 2019-08-28 DIAGNOSIS — E11.40: ICD-10-CM

## 2019-08-28 DIAGNOSIS — E11.621: ICD-10-CM

## 2019-08-28 DIAGNOSIS — Z79.01: ICD-10-CM

## 2019-08-28 DIAGNOSIS — R32: ICD-10-CM

## 2019-08-28 DIAGNOSIS — Z99.3: ICD-10-CM

## 2019-08-28 DIAGNOSIS — N18.4: ICD-10-CM

## 2019-08-28 DIAGNOSIS — E03.9: ICD-10-CM

## 2019-08-28 DIAGNOSIS — I27.20: ICD-10-CM

## 2019-08-28 DIAGNOSIS — D63.8: ICD-10-CM

## 2019-08-28 DIAGNOSIS — Z79.4: ICD-10-CM

## 2019-08-28 DIAGNOSIS — Z79.82: ICD-10-CM

## 2019-08-28 DIAGNOSIS — E11.22: ICD-10-CM

## 2019-08-28 DIAGNOSIS — M19.042: ICD-10-CM

## 2019-08-28 DIAGNOSIS — E11.52: Primary | ICD-10-CM

## 2019-08-28 DIAGNOSIS — Z98.41: ICD-10-CM

## 2019-08-28 DIAGNOSIS — E78.5: ICD-10-CM

## 2019-08-28 DIAGNOSIS — Z79.899: ICD-10-CM

## 2019-08-28 DIAGNOSIS — E44.1: ICD-10-CM

## 2019-08-28 DIAGNOSIS — Z82.49: ICD-10-CM

## 2019-08-28 DIAGNOSIS — D50.9: ICD-10-CM

## 2019-08-28 DIAGNOSIS — R53.81: ICD-10-CM

## 2019-08-28 DIAGNOSIS — Z85.41: ICD-10-CM

## 2019-08-28 DIAGNOSIS — Z87.440: ICD-10-CM

## 2019-08-28 DIAGNOSIS — E87.5: ICD-10-CM

## 2019-08-28 DIAGNOSIS — Z90.710: ICD-10-CM

## 2019-08-28 DIAGNOSIS — D63.1: ICD-10-CM

## 2019-08-28 DIAGNOSIS — Z79.890: ICD-10-CM

## 2019-08-28 DIAGNOSIS — I48.0: ICD-10-CM

## 2019-08-28 DIAGNOSIS — D72.829: ICD-10-CM

## 2019-08-28 DIAGNOSIS — Z98.42: ICD-10-CM

## 2019-08-28 DIAGNOSIS — L97.419: ICD-10-CM

## 2019-08-28 DIAGNOSIS — T82.7XXA: ICD-10-CM

## 2019-08-28 DIAGNOSIS — I12.9: ICD-10-CM

## 2019-08-28 DIAGNOSIS — Z82.3: ICD-10-CM

## 2019-08-28 DIAGNOSIS — B96.5: ICD-10-CM

## 2019-08-28 DIAGNOSIS — M47.9: ICD-10-CM

## 2019-08-28 LAB
ALBUMIN SERPL-MCNC: 3.4 G/DL (ref 3.5–5)
ALP SERPL-CCNC: 101 U/L (ref 38–126)
ALT SERPL-CCNC: 22 U/L (ref 9–52)
ANION GAP SERPL CALC-SCNC: 11 MMOL/L
AST SERPL-CCNC: 16 U/L (ref 14–36)
BASOPHILS # BLD AUTO: 0.1 K/UL (ref 0–0.2)
BASOPHILS NFR BLD AUTO: 1 %
BUN SERPL-SCNC: 59 MG/DL (ref 7–17)
CALCIUM SPEC-MCNC: 9.1 MG/DL (ref 8.4–10.2)
CHLORIDE SERPL-SCNC: 104 MMOL/L (ref 98–107)
CO2 SERPL-SCNC: 21 MMOL/L (ref 22–30)
EOSINOPHIL # BLD AUTO: 0.5 K/UL (ref 0–0.7)
EOSINOPHIL NFR BLD AUTO: 3 %
ERYTHROCYTE [DISTWIDTH] IN BLOOD BY AUTOMATED COUNT: 2.76 M/UL (ref 3.8–5.4)
ERYTHROCYTE [DISTWIDTH] IN BLOOD: 14.5 % (ref 11.5–15.5)
GLUCOSE BLD-MCNC: 167 MG/DL (ref 75–99)
GLUCOSE SERPL-MCNC: 112 MG/DL (ref 74–99)
HCT VFR BLD AUTO: 22.9 % (ref 34–46)
HGB BLD-MCNC: 7.3 GM/DL (ref 11.4–16)
INR PPP: 1.7 (ref ?–1.2)
LYMPHOCYTES # SPEC AUTO: 1.8 K/UL (ref 1–4.8)
LYMPHOCYTES NFR SPEC AUTO: 13 %
MCH RBC QN AUTO: 26.4 PG (ref 25–35)
MCHC RBC AUTO-ENTMCNC: 31.8 G/DL (ref 31–37)
MCV RBC AUTO: 83.1 FL (ref 80–100)
MONOCYTES # BLD AUTO: 1.1 K/UL (ref 0–1)
MONOCYTES NFR BLD AUTO: 8 %
NEUTROPHILS # BLD AUTO: 10.4 K/UL (ref 1.3–7.7)
NEUTROPHILS NFR BLD AUTO: 74 %
PLATELET # BLD AUTO: 433 K/UL (ref 150–450)
POTASSIUM SERPL-SCNC: 4.8 MMOL/L (ref 3.5–5.1)
PROT SERPL-MCNC: 7.6 G/DL (ref 6.3–8.2)
PT BLD: 17.1 SEC (ref 9–12)
SODIUM SERPL-SCNC: 136 MMOL/L (ref 137–145)
WBC # BLD AUTO: 14 K/UL (ref 3.8–10.6)

## 2019-08-28 PROCEDURE — 86901 BLOOD TYPING SEROLOGIC RH(D): CPT

## 2019-08-28 PROCEDURE — 83605 ASSAY OF LACTIC ACID: CPT

## 2019-08-28 PROCEDURE — 85027 COMPLETE CBC AUTOMATED: CPT

## 2019-08-28 PROCEDURE — 93306 TTE W/DOPPLER COMPLETE: CPT

## 2019-08-28 PROCEDURE — 80053 COMPREHEN METABOLIC PANEL: CPT

## 2019-08-28 PROCEDURE — 85025 COMPLETE CBC W/AUTO DIFF WBC: CPT

## 2019-08-28 PROCEDURE — 87040 BLOOD CULTURE FOR BACTERIA: CPT

## 2019-08-28 PROCEDURE — 87205 SMEAR GRAM STAIN: CPT

## 2019-08-28 PROCEDURE — 84443 ASSAY THYROID STIM HORMONE: CPT

## 2019-08-28 PROCEDURE — 87102 FUNGUS ISOLATION CULTURE: CPT

## 2019-08-28 PROCEDURE — 87070 CULTURE OTHR SPECIMN AEROBIC: CPT

## 2019-08-28 PROCEDURE — 86900 BLOOD TYPING SEROLOGIC ABO: CPT

## 2019-08-28 PROCEDURE — 83540 ASSAY OF IRON: CPT

## 2019-08-28 PROCEDURE — 87075 CULTR BACTERIA EXCEPT BLOOD: CPT

## 2019-08-28 PROCEDURE — 99285 EMERGENCY DEPT VISIT HI MDM: CPT

## 2019-08-28 PROCEDURE — 85610 PROTHROMBIN TIME: CPT

## 2019-08-28 PROCEDURE — 84484 ASSAY OF TROPONIN QUANT: CPT

## 2019-08-28 PROCEDURE — 83550 IRON BINDING TEST: CPT

## 2019-08-28 PROCEDURE — 36415 COLL VENOUS BLD VENIPUNCTURE: CPT

## 2019-08-28 PROCEDURE — 83880 ASSAY OF NATRIURETIC PEPTIDE: CPT

## 2019-08-28 PROCEDURE — 86850 RBC ANTIBODY SCREEN: CPT

## 2019-08-28 PROCEDURE — 83036 HEMOGLOBIN GLYCOSYLATED A1C: CPT

## 2019-08-28 PROCEDURE — 36430 TRANSFUSION BLD/BLD COMPNT: CPT

## 2019-08-28 PROCEDURE — 87077 CULTURE AEROBIC IDENTIFY: CPT

## 2019-08-28 PROCEDURE — 86920 COMPATIBILITY TEST SPIN: CPT

## 2019-08-28 PROCEDURE — 87186 SC STD MICRODIL/AGAR DIL: CPT

## 2019-08-28 PROCEDURE — 71046 X-RAY EXAM CHEST 2 VIEWS: CPT

## 2019-08-28 NOTE — P.GSCN
History of Present Illness


Consult date: 19


History of present illness: 





Dinah is an 84-year-old female with a right heel wound.  She was initially seen 

in the office today for evaluation of her wound possibility of needing an 

amputation.  She has a past medical history of atrial fibrillation with 

anticoagulation, peripheral arterial disease, hypertension, hypothyroidism, 

chronic debility, 2 diabetes, hyperlipidemia, chronic kidney disease.  She has 

had a wound on her heel for many months per the patient.  Telephone call was 

also made to her grandson who is her primary contact.  He states that she has 

had issues with this right lower extremity for many years.  She's had multiple 

bypasses in this leg and issues with multiple wounds.  At some point there was 

previous discussion of possibly needing an amputation but the patient at that 

point years ago did not want to undergo at that time.  She's had a wound on her 

right heel now for significant amount of time and is has only continue to 

progress.  At this time she denies any fevers, chills, nausea or vomiting.  





Review of Systems





14 point review of systems performed.  Pertinent positives and negatives per the

HPI





Past Medical History


Past Medical History: Atrial Fibrillation, Diabetes Mellitus, Hyperlipidemia, 

Hypertension, Thyroid Disorder


Additional Past Medical History / Comment(s): peripheral neuropathy, UTIs, 

urinary incontinence, OA bilateral hands and back, PVD, cervical cancer


History of Any Multi-Drug Resistant Organisms: MRSA


Year Discovered:: 2016


MDRO Source:: RIGHT FOOT


Past Surgical History: Hysterectomy, Tonsillectomy


Additional Past Surgical History / Comment(s): R leg bypass surgery, bilateral 

cataract removal


Past Anesthesia/Blood Transfusion Reactions: No Reported Reaction


Past Psychological History: No Psychological Hx Reported


Smoking Status: Never smoker


Past Alcohol Use History: None Reported


Past Drug Use History: None Reported





- Past Family History


  ** Mother


Family Medical History: CVA/TIA


Additional Family Medical History / Comment(s): Mother  in her 70's.





  ** Father


Family Medical History: Hypertension


Additional Family Medical History / Comment(s): Father  in his 70's.





Medications and Allergies


                                Home Medications











 Medication  Instructions  Recorded  Confirmed  Type


 


Aspirin 81 mg PO DAILY 16 History


 


Levothyroxine Sodium [Synthroid] 50 mcg PO QAM 16 History


 


Pravastatin Sodium [Pravachol] 80 mg PO DAILY@1800 16 History


 


Sennosides-Docusate Sodium 1 tab PO DAILY 17 History





[Senokot-S]    


 


Multivitamins, Thera [Multivitamin 1 tab PO HS 10/01/18 08/28/19 History





(formulary)]    


 


Potassium Chloride ER [K-Dur 10] 10 meq PO DAILY 10/01/18 08/28/19 History


 


Sodium Chloride 5% Ophth Soln 1 drop BOTH EYES BID 10/01/18 08/28/19 History





[Vahid 128]    


 


Furosemide [Lasix] 40 mg PO DAILY #0 10/05/18 08/28/19 Rx


 


Amino Acids/Protein Hydrolys 30 ml PO DAILY 18 History





[Pro-Stat Supplement]    


 


amLODIPine BESYLATE 5 mg PO DAILY 18 History


 


Spironolactone [Aldactone] 12.5 mg PO DAILY 18 History


 


Cranberry 450mg 450 mg PO DAILY 19 History


 


Losartan Potassium 100 mg PO DAILY 19 History


 


Metoprolol Tartrate [Lopressor] 100 mg PO BID 19 History


 


Warfarin [Coumadin] 2.5 mg PO HS 19 History


 


hydrALAZINE HCL [Apresoline] 100 mg PO TID 19 History


 


sitaGLIPtin PHOSPHATE [Januvia] 50 mg PO DAILY@1800 19 History


 


INSULIN LISPRO (humaLOG) [humaLOG] 6 units SQ AC-TID #0 03/15/19 08/28/19 Rx


 


Amoxic-Pot Clav 875-125Mg 1 tab PO Q12HR 19 History





[Augmentin 875-125]    


 


Bisacodyl 5 mg PO DAILY PRN 19 History


 


Cholecalciferol (Vitamin D3) 2,000 unit PO DAILY 19 History





[Vitamin D3]    


 


Ferrous Sulfate [Feosol] 325 mg PO DAILY 19 History


 


Fluticasone Nasal Spray [Flonase 1 spr EA NOSTRIL DAILY 19 

History





Nasal Spray]    


 


Insulin Detemir (Levemir) [Levemir] 20 unit SQ DAILY@1800 19 

History


 


Loratadine [Claritin] 10 mg PO DAILY 19 History








                                    Allergies











Allergy/AdvReac Type Severity Reaction Status Date / Time


 


No Known Allergies Allergy   Verified 19 13:54














Surgical - Exam


                                   Vital Signs











Temp Pulse Resp BP Pulse Ox


 


 98.1 F   84   18   148/75   100 


 


 19 12:29  19 12:29  19 12:29  19 12:29  19 12:29














Genitals a pleasant cooperative female in a wheelchair, no acute distress


HEENT is normal cephalic atraumatic extra motion intact 


heart is irregularly irregular 


lungs without any respiratory distress.  No wheezing


Abdomen is obese, nontender nondistended.  Extremities the right lower extremity

 is malodorous.  There is a significant calcaneal ulceration with wet gangrene. 

 Nonpalpable pulses.  Slightly delayed but adequate capillary refill.





Assessment and Plan


Assessment: 





#1 wet gangrene right heel


#2 peripheral arterial disease


#3 atrial fibrillation on anticoagulation


#4 hypertension


#5 diabetes


#6 neuropathy


#7 hypothyroidism


#8 hyperlipidemia


#9 chronic debility, nonambulatory


Plan: 





This time the patient is admitted to the hospital for more expedient evaluation 

and workup for need to amputate her right lower extremity.  Given her peripheral

 arterial disease with previously failed bypasses, immobility and the fact she 

does not ambulate, we would go forth with a above-knee amputation.  Prior to 

this would like cardiology evaluation and workup.  Hold Coumadin at this point, 

If the patient needs anticoagulation would prefer heparin drip which will be 

discontinued prior to surgery.  Hopeful to be able to perform surgical 

intervention on  unless otherwise noted per medicine and cardiology.





Please call with any questions or concerns [phone] (pager)

## 2019-08-28 NOTE — P.HPIM
History of Present Illness


H&P Date: 19


Chief Complaint: Gangrene right heel








This is an 84-year-old pleasant female followed by Dr. Pardo resides [facility] known history of diabetes mellitus type 2 requiring 

insulin, with PAD and other complications peripheral neuropathy hypertension, hy

pothyroidism atrial fibrillation, PAD, call chronic and gangrene/chronic ulcer 

diabetic foot ulcers right heel at least since 2019, admitted to [facility] as the ulcers has failed to heal, and they have discussed 

the necessity of amputation, and requested to be admitted for the vascular 

surgeon to auscultation with Dr. Perez, he she was also seen by Dr. Ross in 

the past.  Cardiology was requested to see the patient consultation for preop, 

patient denies any history of CHF in the past, no history off CVA, no documented

history of myocardial infarction, we are going to request an EKG him up preop.  

ProBNP, echocardiogram, with anticipation that the surgery would be done in the 

next 1-2 days.  Coumadin will be held,





 





Review of Systems


Constitutional: Reports as per HPI, Denies anorexia, Denies chills, Denies 

chronic headaches, Denies chronic pain, Denies daytime sleepiness, Denies 

fatigue, Denies fever, Denies lethargy, Denies malaise, Denies night sweats, 

Denies poor appetite, Denies sweats, Denies weakness, Denies weight gain, Denies

weight loss


Ears: bilateral: decreased hearing


Ears, nose, mouth and throat: Reports as per HPI


Cardiovascular: Reports as per HPI, Denies chest pain, Denies claudication, 

Denies decreased exercise tolerance, Denies dyspnea on exertion, Denies edema, 

Denies high blood pressure, Denies irregular heart beat, Denies leg edema, 

Denies lightheadedness, Denies orthopnea, Denies palpitations, Denies paroxysmal

nocturnal dyspnea, Denies phlebitis, Denies rapid heart beat, Denies shortness 

of breath, Denies syncope


Respiratory: Reports as per HPI


Gastrointestinal: Denies as per HPI, Denies abdominal pain, Denies belching, 

Denies bloating, Denies BRBPR, Denies change in bowel habits, Denies coffee 

ground emesis, Denies constipation, Denies diarrhea, Denies dyspepsia, Denies 

early satiety, Denies excessive gas, Denies heartburn, Denies hematemesis, 

Denies hematochezia, Denies indigestion, Denies jaundice, Denies lactose 

intolerance, Denies loss of appetite, Denies melena, Denies nausea, Denies 

vomiting


Genitourinary: Reports as per HPI


Menstruation: Reports as per HPI


Musculoskeletal: Reports as per HPI


Integumentary: Reports as per HPI, Reports foot/leg ulcers, Reports sores


Neurological: Reports as per HPI


Psychiatric: Reports as per HPI


Endocrine: Reports as per HPI


Hematologic/Lymphatic: Reports as per HPI


Allergic/Immunologic: Reports as per HPI





Past Medical History


Past Medical History: Atrial Fibrillation, Diabetes Mellitus, Hyperlipidemia, 

Hypertension, Thyroid Disorder


Additional Past Medical History / Comment(s): peripheral neuropathy, UTIs, u

rinary incontinence, OA bilateral hands and back, PVD, cervical cancer


History of Any Multi-Drug Resistant Organisms: MRSA


Date of last positivie culture/infection: 2016


MDRO Source:: RIGHT FOOT


Past Surgical History: Hysterectomy, Tonsillectomy


Additional Past Surgical History / Comment(s): R leg bypass surgery, bilateral 

cataract removal


Past Anesthesia/Blood Transfusion Reactions: No Reported Reaction


Past Psychological History: No Psychological Hx Reported


Additional Psychological History / Comment(s): Pt states she lives at [facility].  States uses a wheelchair to get around.


Smoking Status: Never smoker


Past Alcohol Use History: None Reported


Additional Past Alcohol Use History / Comment(s): Patient resides at [facility] and is wheelchair bound.


Past Drug Use History: None Reported





- Past Family History


  ** Mother


Family Medical History: CVA/TIA


Additional Family Medical History / Comment(s): Mother  in her 70's.





  ** Father


Family Medical History: Hypertension


Additional Family Medical History / Comment(s): Father  in his 70's.





Medications and Allergies


                                Home Medications











 Medication  Instructions  Recorded  Confirmed  Type


 


Aspirin 81 mg PO DAILY 16 History


 


Levothyroxine Sodium [Synthroid] 50 mcg PO QAM 16 History


 


Pravastatin Sodium [Pravachol] 80 mg PO DAILY@1800 16 History


 


Sennosides-Docusate Sodium 1 tab PO DAILY 17 History





[Senokot-S]    


 


Multivitamins, Thera [Multivitamin 1 tab PO HS 10/01/18 08/28/19 History





(formulary)]    


 


Potassium Chloride ER [K-Dur 10] 10 meq PO DAILY 10/01/18 08/28/19 History


 


Sodium Chloride 5% Ophth Soln 1 drop BOTH EYES BID 10/01/18 08/28/19 History





[Vahid 128]    


 


Furosemide [Lasix] 40 mg PO DAILY #0 10/05/18 08/28/19 Rx


 


Amino Acids/Protein Hydrolys 30 ml PO DAILY 18 History





[Pro-Stat Supplement]    


 


amLODIPine BESYLATE 5 mg PO DAILY 18 History


 


Spironolactone [Aldactone] 12.5 mg PO DAILY 18 History


 


Cranberry 450mg 450 mg PO DAILY 19 History


 


Losartan Potassium 100 mg PO DAILY 19 History


 


Metoprolol Tartrate [Lopressor] 100 mg PO BID 19 History


 


Warfarin [Coumadin] 2.5 mg PO HS 19 History


 


hydrALAZINE HCL [Apresoline] 100 mg PO TID 19 History


 


sitaGLIPtin PHOSPHATE [Januvia] 50 mg PO DAILY@1800 19 History


 


INSULIN LISPRO (humaLOG) [humaLOG] 6 units SQ AC-TID #0 03/15/19 08/28/19 Rx


 


Amoxic-Pot Clav 875-125Mg 1 tab PO Q12HR 19 History





[Augmentin 875-125]    


 


Bisacodyl 5 mg PO DAILY PRN 19 History


 


Cholecalciferol (Vitamin D3) 2,000 unit PO DAILY 19 History





[Vitamin D3]    


 


Ferrous Sulfate [Feosol] 325 mg PO DAILY 19 History


 


Fluticasone Nasal Spray [Flonase 1 spr EA NOSTRIL DAILY 19 

History





Nasal Spray]    


 


Insulin Detemir (Levemir) [Levemir] 20 unit SQ DAILY@1800 19 

History


 


Loratadine [Claritin] 10 mg PO DAILY 19 History








                                    Allergies











Allergy/AdvReac Type Severity Reaction Status Date / Time


 


No Known Allergies Allergy   Verified 19 13:54














Physical Exam


Vitals: 


                                   Vital Signs











  Temp Pulse Pulse Resp BP BP Pulse Ox


 


 19 19:45  98.3 F   73  18   125/68  94 L


 


 19 12:29  98.1 F  84   18  148/75   100








                                Intake and Output











 19





 06:59 14:59 22:59


 


Output Total   1400


 


Balance   -1400


 


Output:   


 


  Urine   1400


 


Other:   


 


  Weight  86.183 kg 














- Constitutional


General appearance: cooperative, no acute distress, obese





- EENT


Eyes: anicteric sclerae, EOMI, dentition normal, normal appearance


ENT: hard of hearing, NA/AT, normal oropharynx





- Neck


Neck: normal ROM





- Respiratory


Respiratory: bilateral: CTA, negative: diminished, dullness, rales





- Cardiovascular


Rhythm: regular


Heart sounds: normal: S1, S2


Abnormal Heart Sounds: no systolic murmur, no diastolic murmur, no rub, no S3 

Gallop, no S4 Gallop, no click, no other





- Gastrointestinal


General gastrointestinal: normal bowel sounds, soft





- Integumentary


Integumentary: normal, ulcer (Has significant calcaneal ulceration with 

gangrene, mainly moist, PAD is nonpalpable, there is marked odor in the wound,)





- Neurologic


Neurologic: CNII-XII intact





- Musculoskeletal


Musculoskeletal: strength equal bilaterally





- Psychiatric


Psychiatric: A&O x's 3, appropriate affect, intact judgment & insight





Results


CBC & Chem 7: 


                                 19 05:32





                                 19 05:32


Labs: 


                  Abnormal Lab Results - Last 24 Hours (Table)











  19 Range/Units





  14:26 14:26 21:27 


 


WBC  14.0 H    (3.8-10.6)  k/uL


 


RBC  2.76 L    (3.80-5.40)  m/uL


 


Hgb  7.3 L    (11.4-16.0)  gm/dL


 


Hct  22.9 L    (34.0-46.0)  %


 


Neutrophils #  10.4 H    (1.3-7.7)  k/uL


 


Monocytes #  1.1 H    (0-1.0)  k/uL


 


Sodium   136 L   (137-145)  mmol/L


 


Carbon Dioxide   21 L   (22-30)  mmol/L


 


BUN   59 H   (7-17)  mg/dL


 


Creatinine   2.34 H   (0.52-1.04)  mg/dL


 


Glucose   112 H   (74-99)  mg/dL


 


POC Glucose (mg/dL)    167 H  (75-99)  mg/dL


 


Albumin   3.4 L   (3.5-5.0)  g/dL








                               Laboratory Results











WBC  14.0 k/uL (3.8-10.6)  H  19  14:26    


 


RBC  2.76 m/uL (3.80-5.40)  L  19  14:26    


 


Hgb  7.3 gm/dL (11.4-16.0)  L  19  14:26    


 


Hct  22.9 % (34.0-46.0)  L  19  14:26    


 


MCV  83.1 fL (80.0-100.0)   19  14:26    


 


MCH  26.4 pg (25.0-35.0)   19  14:26    


 


MCHC  31.8 g/dL (31.0-37.0)   19  14:26    


 


RDW  14.5 % (11.5-15.5)   19  14:26    


 


Plt Count  433 k/uL (150-450)   19  14:26    


 


Neutrophils %  74 %  19  14:26    


 


Lymphocytes %  13 %  19  14:26    


 


Monocytes %  8 %  19  14:26    


 


Eosinophils %  3 %  19  14:26    


 


Basophils %  1 %  19  14:26    


 


Neutrophils #  10.4 k/uL (1.3-7.7)  H  19  14:26    


 


Lymphocytes #  1.8 k/uL (1.0-4.8)   19  14:26    


 


Monocytes #  1.1 k/uL (0-1.0)  H  19  14:26    


 


Eosinophils #  0.5 k/uL (0-0.7)   19  14:26    


 


Basophils #  0.1 k/uL (0-0.2)   19  14:26    


 


Hypochromasia  Slight   19  14:26    


 


PT  17.1 sec (9.0-12.0)  H  19  14:26    


 


INR  1.7  (<1.2)  H  19  14:26    


 


Sodium  136 mmol/L (137-145)  L  19  14:26    


 


Potassium  4.8 mmol/L (3.5-5.1)   19  14:26    


 


Chloride  104 mmol/L ()   19  14:26    


 


Carbon Dioxide  21 mmol/L (22-30)  L  19  14:26    


 


Anion Gap  11 mmol/L  19  14:26    


 


BUN  59 mg/dL (7-17)  H  19  14:26    


 


Creatinine  2.34 mg/dL (0.52-1.04)  H  19  14:26    


 


Est GFR (CKD-EPI)AfAm  21  (>60 ml/min/1.73 sqM)   19  14:26    


 


Est GFR (CKD-EPI)NonAf  19  (>60 ml/min/1.73 sqM)   19  14:26    


 


Glucose  112 mg/dL (74-99)  H  19  14:26    


 


POC Glucose (mg/dL)  167 mg/dL (75-99)  H  19  21:27    


 


POC Glu Operater ID  Jaciel Vernon   19  21:27    


 


Plasma Lactic Acid Amari  0.8 mmol/L (0.7-2.0)   19  14:26    


 


Calcium  9.1 mg/dL (8.4-10.2)   19  14:26    


 


Total Bilirubin  0.4 mg/dL (0.2-1.3)   19  14:26    


 


AST  16 U/L (14-36)   19  14:26    


 


ALT  22 U/L (9-52)   19  14:26    


 


Alkaline Phosphatase  101 U/L ()   19  14:26    


 


NT-Pro-B Natriuret Pep  6910 pg/mL  19  14:26    


 


Total Protein  7.6 g/dL (6.3-8.2)   19  14:26    


 


Albumin  3.4 g/dL (3.5-5.0)  L  19  14:26    














Thrombosis Risk Factor Assmnt





- DVT/VTE Prophylaxis


DVT/VTE Prophylaxis: Pharmacologic Prophylaxis ordered, Contraindicated - See 

note (Calcaneal ulcer)





- Choose All That Apply


Any of the Below Risk Factors Present?: Yes


Each Factor Represents 1 point: Obesity (BMI >25)


Other Risk Factors: Yes


Each Risk Factor Represents 2 Points: Patient confined to bed


Other congenital or acquired thrombophilia - If yes, enter type in comment: No


Thrombosis Risk Factor Assessment Total Risk Factor Score: 3


Thrombosis Risk Factor Assessment Level: Moderate Risk





Assessment and Plan


Plan: 





1.  Diabetic foot ulcer, involving right heel, with wet gangrene, known history 

of PAD, failed to heal despite adequate length of time as an outpatient, appr

oximately 6 months has passed by, patient is now being prepared for amputation, 

 a Ana vascular surgeon, we are going to prepare the patient operatively, 

with an EKG, proBNP, echocardiogram, and a chest x-ray.  He is on the current 

EKG based on the current values, no one cultures are anticipated to be done on 

as its expected that the amputation site with the above the gangrenous area 

however I will leave it up to the surgeon for the choice of culture, patient 

will be receiving pre-or prophylaxis antibiotics





Preop clearance as follows RCRI of 2 based on creatinine, and current use 

insulin preop;


ASA risk of class III, patient would be monitored with telemetry,   proBNP, EKG,

 echocardiogram, troponins will be monitored for the next 2 days along with the 

proBNP.


  


2 Iron deficiency anemia or anemia of chronic disease needs to be called, 

patient we'll have iron studies, hemoglobin currently at 7.3, patient might need

 1 unit of packed red blood cells prior to amputation,





3.  Paroxysmal atrial fibrillation on chronic Coumadin for anticoagulation, 

Coumadin will be held today in this patient for her proposed procedure for below

 the knee amputation, patient would be receiving IV heparin,





4.  CK D stage IV, creatinine of 2.34, avoid nephrotoxins and hypotension,





5 paroxysmal atrial fibrillation on Coumadin.  Coumadin on hold a 28 with 

anticipation for the proposed below the knee amputation  right leg





 [id number] with diabetic neuropathy continue Lantus 30 units daily at bedtime 

with hold 10 units lispro with sliding scale at this time until diet by mouth 

has fully recovered, correctional scale insulin to be given, continue her 

gentle, A1c to be done to maximize treatment,  





7hypertension   on metoprolol  hold Lasix hold hydrochlorothiazide and 

amiloride.  Hold lisinopril for hyper hyperkalemia





8Hyperlipidemia hold statins until CPK would stabilize





9Mild protein calorie malnutrition patient will be started on Glucerna, or 

ensure and live


 


Debility, with expected below the knee amputation,  to be seen by PT OT 

postdischarge, and subacute rehab is expected





 DVT prophylaxis INR therapeutic hold Coumadin, INR daily





 GI prophylaxis Pepcid 20 mg by mouth daily





 CODE STATUS full code

## 2019-08-28 NOTE — ED
Recheck HPI





- General


Chief Complaint: Recheck/Abnormal Lab/Rx


Stated Complaint: pre surg issue


Time Seen by Provider: 19 12:37


Source: patient


Mode of arrival: ambulatory





- History of Present Illness


Initial Comments: 


84-year-old female with extensive past medical history presents today for chief 

complaint of sent in by provider for amputation of right foot.  Patient states 

that she has had issues with chronic ulcers.  Patient states she has had 

increasing problems with her right heel, "for quite some time".  Patient denies 

any significant  pain.  She states she has neuropathy.  Patient states she was 

evaluated by her provider who sent her to the emergency department to be 

admitted for evaluation by vascular surgeon Dr. Perez who is to perform the 

amputation. Patient denies or chills night sweats or flank symptoms.  Patient 

has no other complaints. Appears well on arrival.








- Related Data


                                Home Medications











 Medication  Instructions  Recorded  Confirmed


 


Aspirin 81 mg PO DAILY 16


 


Levothyroxine Sodium [Synthroid] 50 mcg PO QAM 16


 


Pravastatin Sodium [Pravachol] 80 mg PO DAILY@1800 16


 


Sennosides-Docusate Sodium 1 tab PO DAILY 17





[Senokot-S]   


 


Multivitamins, Thera [Multivitamin 1 tab PO HS 10/01/18 08/28/19





(formulary)]   


 


Potassium Chloride ER [K-Dur 10] 10 meq PO DAILY 10/01/18 08/28/19


 


Sodium Chloride 5% Ophth Soln 1 drop BOTH EYES BID 10/01/18 08/28/19





[Vahid 128]   


 


Amino Acids/Protein Hydrolys 30 ml PO DAILY 18





[Pro-Stat Supplement]   


 


amLODIPine BESYLATE 5 mg PO DAILY 18


 


Spironolactone [Aldactone] 12.5 mg PO DAILY 18


 


Cranberry 450mg 450 mg PO DAILY 19


 


Losartan Potassium 100 mg PO DAILY 19


 


Metoprolol Tartrate [Lopressor] 100 mg PO BID 19


 


Warfarin [Coumadin] 2.5 mg PO HS 19


 


hydrALAZINE HCL [Apresoline] 100 mg PO TID 19


 


sitaGLIPtin PHOSPHATE [Januvia] 50 mg PO DAILY@1800 19


 


Amoxic-Pot Clav 875-125Mg 1 tab PO Q12HR 19





[Augmentin 875-125]   


 


Bisacodyl 5 mg PO DAILY PRN 19


 


Cholecalciferol (Vitamin D3) 2,000 unit PO DAILY 19





[Vitamin D3]   


 


Ferrous Sulfate [Feosol] 325 mg PO DAILY 19


 


Fluticasone Nasal Spray [Flonase 1 spr EA NOSTRIL DAILY 19





Nasal Spray]   


 


Insulin Detemir (Levemir) [Levemir] 20 unit SQ DAILY@1800 19


 


Loratadine [Claritin] 10 mg PO DAILY 19








                                  Previous Rx's











 Medication  Instructions  Recorded


 


Furosemide [Lasix] 40 mg PO DAILY #0 10/05/18


 


INSULIN LISPRO (humaLOG) [humaLOG] 6 units SQ AC-TID #0 03/15/19











                                    Allergies











Allergy/AdvReac Type Severity Reaction Status Date / Time


 


No Known Allergies Allergy   Verified 19 13:54














Review of Systems


ROS Statement: 


Those systems with pertinent positive or pertinent negative responses have been 

documented in the HPI.





ROS Other: All systems not noted in ROS Statement are negative.





Past Medical History


Past Medical History: Atrial Fibrillation, Diabetes Mellitus, Hyperlipidemia, 

Hypertension, Thyroid Disorder


Additional Past Medical History / Comment(s): peripheral neuropathy, UTIs, 

urinary incontinence, OA bilateral hands and back, PVD, cervical cancer


History of Any Multi-Drug Resistant Organisms: MRSA


Date of last positivie culture/infection: 2016


MDRO Source:: RIGHT FOOT


Past Surgical History: Hysterectomy, Tonsillectomy


Additional Past Surgical History / Comment(s): R leg bypass surgery, bilateral 

cataract removal


Past Anesthesia/Blood Transfusion Reactions: No Reported Reaction


Past Psychological History: No Psychological Hx Reported


Smoking Status: Never smoker


Past Alcohol Use History: None Reported


Past Drug Use History: None Reported





- Past Family History


  ** Mother


Family Medical History: CVA/TIA


Additional Family Medical History / Comment(s): Mother  in her 70's.





  ** Father


Family Medical History: Hypertension


Additional Family Medical History / Comment(s): Father  in his 70's.





General Exam





- General Exam Comments


Initial Comments: 


General:  The patient is awake and alert, in no distress, and does not appear 

acutely ill. 


Eye:  Pupils are equal, round and reactive to light, extra-ocular movements are 

intact.  No nystagmus.  There is normal conjunctiva bilaterally.  No signs of 

icterus.  


Ears, nose, mouth and throat:  There are moist mucous membranes and no oral 

lesions. 


Cardiovascular:  There is a regular rate and rhythm. No murmur, rub or gallop is

 appreciated.


Respiratory:  Lungs are clear to auscultation, respirations are non-labored, 

breath sounds are equal.  No wheezes, stridor, rales, or rhonchi.


Musculoskeletal:  Normal ROM, no tenderness.  Strength 5/5. Sensation intact. 

Pulses equal bilaterally 2+.  


Neurological:  A&O x 3. CN II-XII intact, There are no obvious motor or sensory 

deficits. Coordination appears grossly intact. Speech is normal.


Skin:  Skin is warm and dry and no rashes or lesions are noted. 


Psychiatric:  Cooperative, appropriate mood & affect, normal judgment.  








Course


                                   Vital Signs











  19





  12:29


 


Temperature 98.1 F


 


Pulse Rate 84


 


Respiratory 18





Rate 


 


Blood Pressure 148/75


 


O2 Sat by Pulse 100





Oximetry 














- Reevaluation(s)


Reevaluation #1: 


Staff is unable to obtain line.


19 15:27








Medical Decision Making





- Medical Decision Making


85yo female presenting for amputation of the right lower extremity. Patient has 

gangrene on examination. Afebrile--leukocytosis, does not appear toxic/septic. 

Evaluated by surgery in the ER. Patient case was discussed with Dr. Lucas. Who

 spoke with admitting provider. No other complaints. Patient appears well. Plan 

is to ungo surgical clearance before above knee amputation.








- Lab Data


Result diagrams: 


                                 19 14:26





                                 19 14:26


                                   Lab Results











  19 Range/Units





  14:26 14:26 14:26 


 


WBC  14.0 H    (3.8-10.6)  k/uL


 


RBC  2.76 L    (3.80-5.40)  m/uL


 


Hgb  7.3 L    (11.4-16.0)  gm/dL


 


Hct  22.9 L    (34.0-46.0)  %


 


MCV  83.1    (80.0-100.0)  fL


 


MCH  26.4    (25.0-35.0)  pg


 


MCHC  31.8    (31.0-37.0)  g/dL


 


RDW  14.5    (11.5-15.5)  %


 


Plt Count  433    (150-450)  k/uL


 


Neutrophils %  74    %


 


Lymphocytes %  13    %


 


Monocytes %  8    %


 


Eosinophils %  3    %


 


Basophils %  1    %


 


Neutrophils #  10.4 H    (1.3-7.7)  k/uL


 


Lymphocytes #  1.8    (1.0-4.8)  k/uL


 


Monocytes #  1.1 H    (0-1.0)  k/uL


 


Eosinophils #  0.5    (0-0.7)  k/uL


 


Basophils #  0.1    (0-0.2)  k/uL


 


Hypochromasia  Slight    


 


Sodium   136 L   (137-145)  mmol/L


 


Potassium   4.8   (3.5-5.1)  mmol/L


 


Chloride   104   ()  mmol/L


 


Carbon Dioxide   21 L   (22-30)  mmol/L


 


Anion Gap   11   mmol/L


 


BUN   59 H   (7-17)  mg/dL


 


Creatinine   2.34 H   (0.52-1.04)  mg/dL


 


Est GFR (CKD-EPI)AfAm   21   (>60 ml/min/1.73 sqM)  


 


Est GFR (CKD-EPI)NonAf   19   (>60 ml/min/1.73 sqM)  


 


Glucose   112 H   (74-99)  mg/dL


 


Plasma Lactic Acid Amari    0.8  (0.7-2.0)  mmol/L


 


Calcium   9.1   (8.4-10.2)  mg/dL


 


Total Bilirubin   0.4   (0.2-1.3)  mg/dL


 


AST   16   (14-36)  U/L


 


ALT   22   (9-52)  U/L


 


Alkaline Phosphatase   101   ()  U/L


 


Total Protein   7.6   (6.3-8.2)  g/dL


 


Albumin   3.4 L   (3.5-5.0)  g/dL














Disposition


Clinical Impression: 


 Gangrene, Arterial insufficiency





Disposition: ADMITTED AS IP TO THIS HOSP


Condition: Stable


Is patient prescribed a controlled substance at d/c from ED?: No


Referrals: 


Azalea Pardo MD [Primary Care Provider] - 1-2 days


Time of Disposition: 15:31


Decision to Admit Reason: Admit from EC


Decision Date: 19


Decision Time: 15:31

## 2019-08-29 LAB
ALBUMIN SERPL-MCNC: 3 G/DL (ref 3.5–5)
ALP SERPL-CCNC: 81 U/L (ref 38–126)
ALT SERPL-CCNC: 15 U/L (ref 9–52)
ANION GAP SERPL CALC-SCNC: 10 MMOL/L
AST SERPL-CCNC: 13 U/L (ref 14–36)
BASOPHILS # BLD AUTO: 0.1 K/UL (ref 0–0.2)
BASOPHILS NFR BLD AUTO: 1 %
BUN SERPL-SCNC: 56 MG/DL (ref 7–17)
CALCIUM SPEC-MCNC: 8.7 MG/DL (ref 8.4–10.2)
CHLORIDE SERPL-SCNC: 105 MMOL/L (ref 98–107)
CO2 SERPL-SCNC: 22 MMOL/L (ref 22–30)
EOSINOPHIL # BLD AUTO: 0.4 K/UL (ref 0–0.7)
EOSINOPHIL NFR BLD AUTO: 4 %
ERYTHROCYTE [DISTWIDTH] IN BLOOD BY AUTOMATED COUNT: 2.5 M/UL (ref 3.8–5.4)
ERYTHROCYTE [DISTWIDTH] IN BLOOD BY AUTOMATED COUNT: 2.97 M/UL (ref 3.8–5.4)
ERYTHROCYTE [DISTWIDTH] IN BLOOD: 14.3 % (ref 11.5–15.5)
ERYTHROCYTE [DISTWIDTH] IN BLOOD: 14.7 % (ref 11.5–15.5)
GLUCOSE BLD-MCNC: 120 MG/DL (ref 75–99)
GLUCOSE BLD-MCNC: 125 MG/DL (ref 75–99)
GLUCOSE BLD-MCNC: 131 MG/DL (ref 75–99)
GLUCOSE BLD-MCNC: 233 MG/DL (ref 75–99)
GLUCOSE SERPL-MCNC: 121 MG/DL (ref 74–99)
HBA1C MFR BLD: 5.9 % (ref 4–6)
HCT VFR BLD AUTO: 20.9 % (ref 34–46)
HCT VFR BLD AUTO: 25.3 % (ref 34–46)
HGB BLD-MCNC: 6.5 GM/DL (ref 11.4–16)
HGB BLD-MCNC: 7.9 GM/DL (ref 11.4–16)
IRON SERPL-MCNC: 12 UG/DL (ref 50–170)
LYMPHOCYTES # SPEC AUTO: 1.7 K/UL (ref 1–4.8)
LYMPHOCYTES NFR SPEC AUTO: 17 %
MCH RBC QN AUTO: 26 PG (ref 25–35)
MCH RBC QN AUTO: 26.6 PG (ref 25–35)
MCHC RBC AUTO-ENTMCNC: 31.1 G/DL (ref 31–37)
MCHC RBC AUTO-ENTMCNC: 31.1 G/DL (ref 31–37)
MCV RBC AUTO: 83.7 FL (ref 80–100)
MCV RBC AUTO: 85.3 FL (ref 80–100)
MONOCYTES # BLD AUTO: 0.9 K/UL (ref 0–1)
MONOCYTES NFR BLD AUTO: 9 %
NEUTROPHILS # BLD AUTO: 6.5 K/UL (ref 1.3–7.7)
NEUTROPHILS NFR BLD AUTO: 67 %
PLATELET # BLD AUTO: 349 K/UL (ref 150–450)
PLATELET # BLD AUTO: 358 K/UL (ref 150–450)
POTASSIUM SERPL-SCNC: 4.2 MMOL/L (ref 3.5–5.1)
PROT SERPL-MCNC: 6.7 G/DL (ref 6.3–8.2)
SODIUM SERPL-SCNC: 137 MMOL/L (ref 137–145)
TIBC SERPL-MCNC: 277 UG/DL (ref 228–460)
WBC # BLD AUTO: 11.2 K/UL (ref 3.8–10.6)
WBC # BLD AUTO: 9.7 K/UL (ref 3.8–10.6)

## 2019-08-29 RX ADMIN — INSULIN ASPART SCH: 100 INJECTION, SOLUTION INTRAVENOUS; SUBCUTANEOUS at 07:48

## 2019-08-29 RX ADMIN — FLUTICASONE PROPIONATE SCH SPRAY: 50 SPRAY, METERED NASAL at 07:48

## 2019-08-29 RX ADMIN — METOPROLOL TARTRATE SCH MG: 50 TABLET, FILM COATED ORAL at 07:47

## 2019-08-29 RX ADMIN — INSULIN ASPART SCH UNIT: 100 INJECTION, SOLUTION INTRAVENOUS; SUBCUTANEOUS at 17:25

## 2019-08-29 RX ADMIN — POTASSIUM CHLORIDE SCH MEQ: 750 TABLET, EXTENDED RELEASE ORAL at 07:49

## 2019-08-29 RX ADMIN — ASPIRIN 81 MG CHEWABLE TABLET SCH MG: 81 TABLET CHEWABLE at 07:46

## 2019-08-29 RX ADMIN — INSULIN DETEMIR SCH UNIT: 100 INJECTION, SOLUTION SUBCUTANEOUS at 17:25

## 2019-08-29 RX ADMIN — FUROSEMIDE SCH MG: 40 TABLET ORAL at 07:48

## 2019-08-29 RX ADMIN — LEVOTHYROXINE SODIUM SCH: 50 TABLET ORAL at 05:30

## 2019-08-29 RX ADMIN — Medication SCH UNIT: at 07:46

## 2019-08-29 RX ADMIN — LOSARTAN POTASSIUM SCH MG: 50 TABLET, FILM COATED ORAL at 07:47

## 2019-08-29 RX ADMIN — SODIUM CHLORIDE SCH DROPS: 50 SOLUTION/ DROPS OPHTHALMIC at 20:59

## 2019-08-29 RX ADMIN — INSULIN ASPART SCH: 100 INJECTION, SOLUTION INTRAVENOUS; SUBCUTANEOUS at 13:38

## 2019-08-29 RX ADMIN — LORATADINE SCH MG: 10 TABLET ORAL at 07:47

## 2019-08-29 RX ADMIN — SODIUM CHLORIDE SCH DROPS: 50 SOLUTION/ DROPS OPHTHALMIC at 07:49

## 2019-08-29 RX ADMIN — PRAVASTATIN SODIUM SCH MG: 80 TABLET ORAL at 17:25

## 2019-08-29 RX ADMIN — SPIRONOLACTONE SCH MG: 25 TABLET, FILM COATED ORAL at 07:47

## 2019-08-29 RX ADMIN — METOPROLOL TARTRATE SCH MG: 50 TABLET, FILM COATED ORAL at 20:59

## 2019-08-29 RX ADMIN — DOCUSATE SODIUM AND SENNOSIDES SCH EACH: 50; 8.6 TABLET ORAL at 07:48

## 2019-08-29 RX ADMIN — LINAGLIPTIN SCH MG: 5 TABLET, FILM COATED ORAL at 17:25

## 2019-08-29 NOTE — ECHOF
Referral Reason:chf



MEASUREMENTS

--------

HEIGHT: 160.0 cm

WEIGHT: 86.2 kg

BP: 115/63

RVIDd:   2.6 cm     (< 3.3)

IVSd:   1.3 cm     (0.6 - 1.1)

LVIDd:   4.1 cm     (3.9 - 5.3)

LVPWd:   1.4 cm     (0.6 - 1.1)

IVSs:   1.9 cm

LVIDs:   2.3 cm

LVPWs:   1.8 cm

LAESV Index (A-L):   35.52 ml/m

IVSd:   2.8 cm     (0.6 - 1.1)

LVIDd:   0.0 cm     (3.9 - 5.3)

EDV(Teich):   0 ml

Ao Diam:   3.0 cm     (2.0 - 3.7)

AV Cusp:   1.5 cm     (1.5 - 2.6)

LA Diam:   4.4 cm     (2.7 - 3.8)

MV EXCURSION:   20.477 mm     (> 18.000)

MV EF SLOPE:   158 mm/s     (70 - 150)

EPSS:   0.6 cm

MV E Mane:   1.37 m/s

MV DecT:   214 ms

MV A Mane:   0.36 m/s

MV E/A Ratio:   3.79 

AR PHT:   373 ms

RAP:   5.00 mmHg

RVSP:   40.75 mmHg







FINDINGS

--------

Atrial fibrillation.

This was a technically good study.

The left ventricular size is normal.   There is mild concentric left ventricular hypertrophy.   Overa
 left ventricular systolic function is normal with, an EF between 55 - 60 %.

The right ventricle is normal in size.

LA is moderately dilated 34-39 ml/m2

The right atrial size is normal.

Interatrial and interventricular septum intact.

Aortic valve is trileaflet and is mildly thickened.   There is mild aortic regurgitation.

The mitral valve is normal.   The mitral valve leaflets are mildly thickened.   Mild mitral annular c
alcification present.   Moderate mitral regurgitation is present.

Moderate tricuspid regurgitation present.   There is mild pulmonary hypertension.   The right ventric
ular systolic pressure, as measured by Doppler, is 40.75mmHg.

There is no pulmonic regurgitation present.

The aortic root size is normal.

Normal inferior vena cava with normal inspiratory collapse consistent with estimated right atrial pre
ssure of  5 mmHg.

There is no pericardial effusion.



CONCLUSIONS

--------

1. Atrial fibrillation.

2. This was a technically good study.

3. The left ventricular size is normal.

4. There is mild concentric left ventricular hypertrophy.

5. Overall left ventricular systolic function is normal with, an EF between 55 - 60 %.

6. The right ventricle is normal in size.

7. LA is moderately dilated 34-39 ml/m2

8. The right atrial size is normal.

9. Interatrial and interventricular septum intact.

10. Aortic valve is trileaflet and is mildly thickened.

11. There is mild aortic regurgitation.

12. The mitral valve is normal.

13. The mitral valve leaflets are mildly thickened.

14. Mild mitral annular calcification present.

15. Moderate mitral regurgitation is present.

16. Moderate tricuspid regurgitation present.

17. There is mild pulmonary hypertension.

18. The right ventricular systolic pressure, as measured by Doppler, is 40.75mmHg.

19. There is no pulmonic regurgitation present.

20. The aortic root size is normal.

21. Normal inferior vena cava with normal inspiratory collapse consistent with estimated right atrial
 pressure of  5 mmHg.

22. There is no pericardial effusion.





SONOGRAPHER: Caron Whitehead RDCS

## 2019-08-29 NOTE — P.PN
Subjective


Progress Note Date: 08/29/19





This is an 84-year-old pleasant female followed by Dr. Pardo known history of 

diabetes mellitus type 2 requiring insulin, with PAD and other complications 

peripheral neuropathy hypertension, hypothyroidism atrial fibrillation, PAD, 

chronic gangrene/chronic ulcer diabetic foot ulcers right heel at least since 

March 2019, admitted to [facility] as the ulcers have failed to

heal, and they have discussed the necessity of amputation, and requested to be 

admitted for the vascular surgeon with Dr. Perez, he she was also seen by Dr. Ross in the past.  Cardiology was requested to see the patient consultation 

for preop, patient denies any history of CHF in the past, no history off CVA, no

documented history of myocardial infarction, we are going to request an EKG him 

up preop.  ProBNP, echocardiogram, with anticipation that the surgery would be 

done in the next 1-2 days.  Coumadin will be held,





8/29: Dr. Perez is planning for above-the-knee amputation tomorrow.  Coumadin 

remains on hold.  Cardiology consult requested. Echocardiogram reveals EF 55-

60%, mild concentric left ventricular hypertrophy, mild aortic regurgitation, 

moderate mitral regurgitation, moderate tricuspid regurgitation, mild pulmonary 

hypertension. Chest x-ray reveals chronic changes and cardiomegaly with small 2 

tiny bilateral pleural effusions diminished in size from prior.  Patient has 

been afebrile, heart rate 77, blood pressure 115/63 and pulse ox 99% on room 

air.  Repeat lab work reveals normal white count of 9.7, hemoglobin 6.5 and 1 

unit of packed RBCs will be ordered for transfusion.  BUN 56 and creatinine 

2.33.  Electrolytes within normal limits.  Blood sugar running between 121 and 

167.  Hemoglobin A1c is pending.Patient denies any new complaints.  Perez 

catheter is in place.  Sister is at the bedside.








Objective





- Vital Signs


Vital signs: 


                                   Vital Signs











Temp  98.9 F   08/29/19 01:35


 


Pulse  77   08/29/19 01:35


 


Resp  17   08/29/19 01:35


 


BP  115/63   08/29/19 01:35


 


Pulse Ox  99   08/29/19 01:35








                                 Intake & Output











 08/28/19 08/29/19 08/29/19





 18:59 06:59 18:59


 


Intake Total  50 


 


Output Total  1750 


 


Balance  -1700 


 


Weight 86.183 kg  


 


Intake:   


 


  Oral  50 


 


Output:   


 


  Urine  1750 


 


Other:   


 


  Voiding Method  Indwelling Catheter 














- Exam





 Review of Systems 


Constitutional: No fever, no chills, no night sweats.  No weight change.  No 

weakness, fatigue or lethargy.  No daytime sleepiness.


EENT: No headache.  No blurred vision or double vision, no loss of vision.  No 

loss of Hearing, no ringing in the ears, no dizziness.  No nasal drainage or 

congestion.  No epistaxis.  No sore throat.


Lungs: No shortness of breath, cough, no sputum production.  No wheezing.


Cardiovascular: No chest pain, no lower extremity edema.  No palpitations.  No 

paroxysmal nocturnal dyspnea.  No orthopnea.  No lightheadedness or dizziness.  

No syncopal episodes.


Abdominal: No abdominal pain.  No nausea, vomiting.  No diarrhea.  No 

constipation.  No bloody or tarry stools..  No loss of appetite.


Genitourinary: No dysuria, increased frequency, urgency.  No urinary retention.


Musculoskeletal: No myalgias.  No muscle weakness, no gait dysfunction, no 

frequent falls.  No back pain.  No neck pain.


Integumentary: Reports wounds, no lesions.  No rash or pruritus.  No unusual 

bruising.  No change in hair or nails.


Neurologic: No aphasia. No facial droop. No change in mentation. No head injury.

No headache. No paralysis. No paresthesia.


Psychiatric: No depression.  No anxiety.  No mood swings.


Endocrine: No abnormal blood sugars.  No weight change.  No excessive sweating 

or thirst.  No cold intolerance.  











- Constitutional


General appearance: cooperative, no acute distress, obese





- EENT


Eyes: anicteric sclerae, EOMI, dentition normal, normal appearance


ENT: hearing grossly normal, NA/AT, normal oropharynx





- Neck


Neck: normal ROM





- Respiratory


Respiratory: bilateral: CTA, negative: diminished, dullness, rales





- Cardiovascular


Rhythm: regular


Heart sounds: normal: S1, S2


Abnormal Heart Sounds: no systolic murmur, no diastolic murmur, no rub, no S3 

Gallop, no S4 Gallop, no click, no other





- Gastrointestinal


General gastrointestinal: normal bowel sounds, soft





- Integumentary


Integumentary: normal, ulcer (Has significant calcaneal ulceration with 

gangrene, mainly moist, PAD is nonpalpable, there is marked alteration in the 

wound, foul order)





- Neurologic


Neurologic: CNII-XII intact





- Musculoskeletal


Musculoskeletal: strength equal bilaterally





- Psychiatric


Psychiatric: A&O x's 3, appropriate affect, intact judgment & insight





- Labs


CBC & Chem 7: 


                                 08/29/19 05:32





                                 08/29/19 05:32


Labs: 


                  Abnormal Lab Results - Last 24 Hours (Table)











  08/28/19 08/28/19 08/28/19 Range/Units





  14:26 14:26 14:26 


 


WBC  14.0 H    (3.8-10.6)  k/uL


 


RBC  2.76 L    (3.80-5.40)  m/uL


 


Hgb  7.3 L    (11.4-16.0)  gm/dL


 


Hct  22.9 L    (34.0-46.0)  %


 


Neutrophils #  10.4 H    (1.3-7.7)  k/uL


 


Monocytes #  1.1 H    (0-1.0)  k/uL


 


PT    17.1 H  (9.0-12.0)  sec


 


INR    1.7 H  (<1.2)  


 


Sodium   136 L   (137-145)  mmol/L


 


Carbon Dioxide   21 L   (22-30)  mmol/L


 


BUN   59 H   (7-17)  mg/dL


 


Creatinine   2.34 H   (0.52-1.04)  mg/dL


 


Glucose   112 H   (74-99)  mg/dL


 


POC Glucose (mg/dL)     (75-99)  mg/dL


 


AST     (14-36)  U/L


 


Albumin   3.4 L   (3.5-5.0)  g/dL














  08/28/19 08/29/19 08/29/19 Range/Units





  21:27 05:32 05:32 


 


WBC     (3.8-10.6)  k/uL


 


RBC   2.50 L   (3.80-5.40)  m/uL


 


Hgb   6.5 L*   (11.4-16.0)  gm/dL


 


Hct   20.9 L   (34.0-46.0)  %


 


Neutrophils #     (1.3-7.7)  k/uL


 


Monocytes #     (0-1.0)  k/uL


 


PT     (9.0-12.0)  sec


 


INR     (<1.2)  


 


Sodium     (137-145)  mmol/L


 


Carbon Dioxide     (22-30)  mmol/L


 


BUN    56 H  (7-17)  mg/dL


 


Creatinine    2.33 H  (0.52-1.04)  mg/dL


 


Glucose    121 H  (74-99)  mg/dL


 


POC Glucose (mg/dL)  167 H    (75-99)  mg/dL


 


AST    13 L  (14-36)  U/L


 


Albumin    3.0 L  (3.5-5.0)  g/dL














  08/29/19 Range/Units





  07:23 


 


WBC   (3.8-10.6)  k/uL


 


RBC   (3.80-5.40)  m/uL


 


Hgb   (11.4-16.0)  gm/dL


 


Hct   (34.0-46.0)  %


 


Neutrophils #   (1.3-7.7)  k/uL


 


Monocytes #   (0-1.0)  k/uL


 


PT   (9.0-12.0)  sec


 


INR   (<1.2)  


 


Sodium   (137-145)  mmol/L


 


Carbon Dioxide   (22-30)  mmol/L


 


BUN   (7-17)  mg/dL


 


Creatinine   (0.52-1.04)  mg/dL


 


Glucose   (74-99)  mg/dL


 


POC Glucose (mg/dL)  125 H  (75-99)  mg/dL


 


AST   (14-36)  U/L


 


Albumin   (3.5-5.0)  g/dL














Assessment and Plan


Plan: 





1.  Diabetic foot ulcer, involving right heel, with wet gangrene, known history 

of PAD, failed to heal despite adequate length of time as an outpatient, 

approximately 6 months.  Patient scheduled tentatively for above-the-knee 

amputation on Friday with Dr. Perez.  Preop cardiology clearance requested.  

Coumadin is on hold.  Echocardiogram as above.  





2. Iron deficiency anemia or anemia of chronic disease.  Iron studies pending.  

Transfuse 1 unit of packed RBCs followed by Lasix 20 mg IV push.  





3.  Paroxysmal atrial fibrillation on chronic Coumadin for anticoagulation, 

Coumadin will be held today in this patient for her proposed procedure for below

the knee amputation, patient would be receiving IV heparin,





4.  CKD stage IV, creatinine of 2.34, avoid nephrotoxins and hypotension,





5.  Diabetes mellitus type 2 with diabetic neuropathy.  Continue Lantus 20 units

at bedtime, hold patient's scheduled 6 units with meals.  Continue NovoLog scale

before meals and at bedtime.  Hemoglobin A1c.


 


6.  Hypertension.  Continue amlodipine 5 mg daily, losartan 100 mg daily, 

Lopressor 100 mg twice daily, hydralazine 100 mg 3 times daily, Lasix 40 orally 

milligrams daily, Aldactone 12.5 mg daily.  





7.  Hyperlipidemia.  Continue Pravachol 80 mg daily.





8.  Mild protein calorie malnutrition.  Continue Glucerna.





9.  Generalized debility.  PT and OT to follow after surgery.





10.  DVT prophylaxis.  Coumadin/heparin drip.





11.  GI prophylaxis.  Pepcid





CODE STATUS: Full code








Discharge plan: Return to [facility] under the care of Dr. Pardo.





Impression and plan of care have been directed as dictated by the signing 

physician.  Candy Kumar nurse practitioner acting as scribe for signing 

physician.

## 2019-08-29 NOTE — XR
EXAMINATION TYPE: XR chest 2V

 

DATE OF EXAM: 8/29/2019

 

COMPARISON: Chest x-ray March 12, 2019.

 

HISTORY: Presurgical study.

 

TECHNIQUE:  Frontal and lateral views of the chest are obtained.

 

FINDINGS:  There is persistent cardiomegaly with tiny bilateral pleural effusions diminished in size 
from prior. . Chronic parenchymal change without suspicious new focal airspace opacity or pneumothora
x. Multilevel spurring in thoracic spine is redemonstrated.

 

IMPRESSION:  Chronic changes and cardiomegaly with small to tiny bilateral pleural effusions diminish
ed in size from prior.

## 2019-08-29 NOTE — P.PN
Subjective


Progress Note Date: 08/29/19





Patient seen and examined.  No complaints.





Objective





- Vital Signs


Vital signs: 


                                   Vital Signs











Temp  98.3 F   08/29/19 07:00


 


Pulse  87   08/29/19 07:00


 


Resp  16   08/29/19 07:00


 


BP  123/65   08/29/19 07:00


 


Pulse Ox  96   08/29/19 07:00








                                 Intake & Output











 08/28/19 08/29/19 08/29/19





 18:59 06:59 18:59


 


Intake Total  50 


 


Output Total  1750 


 


Balance  -1700 


 


Weight 86.183 kg  


 


Intake:   


 


  Oral  50 


 


Output:   


 


  Urine  1750 


 


Other:   


 


  Voiding Method  Indwelling Catheter 














- Exam





No acute distress resting comfortably


Heart irregularly irregular


Lungs clear


Abdomen soft, obese, nontender


Foul odor still from right heel





- Labs


CBC & Chem 7: 


                                 08/29/19 05:32





                                 08/29/19 05:32


Labs: 


                  Abnormal Lab Results - Last 24 Hours (Table)











  08/28/19 08/28/19 08/28/19 Range/Units





  14:26 14:26 14:26 


 


WBC  14.0 H    (3.8-10.6)  k/uL


 


RBC  2.76 L    (3.80-5.40)  m/uL


 


Hgb  7.3 L    (11.4-16.0)  gm/dL


 


Hct  22.9 L    (34.0-46.0)  %


 


Neutrophils #  10.4 H    (1.3-7.7)  k/uL


 


Monocytes #  1.1 H    (0-1.0)  k/uL


 


PT    17.1 H  (9.0-12.0)  sec


 


INR    1.7 H  (<1.2)  


 


Sodium   136 L   (137-145)  mmol/L


 


Carbon Dioxide   21 L   (22-30)  mmol/L


 


BUN   59 H   (7-17)  mg/dL


 


Creatinine   2.34 H   (0.52-1.04)  mg/dL


 


Glucose   112 H   (74-99)  mg/dL


 


POC Glucose (mg/dL)     (75-99)  mg/dL


 


AST     (14-36)  U/L


 


Albumin   3.4 L   (3.5-5.0)  g/dL














  08/28/19 08/29/19 08/29/19 Range/Units





  21:27 05:32 05:32 


 


WBC     (3.8-10.6)  k/uL


 


RBC   2.50 L   (3.80-5.40)  m/uL


 


Hgb   6.5 L*   (11.4-16.0)  gm/dL


 


Hct   20.9 L   (34.0-46.0)  %


 


Neutrophils #     (1.3-7.7)  k/uL


 


Monocytes #     (0-1.0)  k/uL


 


PT     (9.0-12.0)  sec


 


INR     (<1.2)  


 


Sodium     (137-145)  mmol/L


 


Carbon Dioxide     (22-30)  mmol/L


 


BUN    56 H  (7-17)  mg/dL


 


Creatinine    2.33 H  (0.52-1.04)  mg/dL


 


Glucose    121 H  (74-99)  mg/dL


 


POC Glucose (mg/dL)  167 H    (75-99)  mg/dL


 


AST    13 L  (14-36)  U/L


 


Albumin    3.0 L  (3.5-5.0)  g/dL














  08/29/19 Range/Units





  07:23 


 


WBC   (3.8-10.6)  k/uL


 


RBC   (3.80-5.40)  m/uL


 


Hgb   (11.4-16.0)  gm/dL


 


Hct   (34.0-46.0)  %


 


Neutrophils #   (1.3-7.7)  k/uL


 


Monocytes #   (0-1.0)  k/uL


 


PT   (9.0-12.0)  sec


 


INR   (<1.2)  


 


Sodium   (137-145)  mmol/L


 


Carbon Dioxide   (22-30)  mmol/L


 


BUN   (7-17)  mg/dL


 


Creatinine   (0.52-1.04)  mg/dL


 


Glucose   (74-99)  mg/dL


 


POC Glucose (mg/dL)  125 H  (75-99)  mg/dL


 


AST   (14-36)  U/L


 


Albumin   (3.5-5.0)  g/dL














Assessment and Plan


Assessment: 





#1 wet gangrene right heel


#2 peripheral arterial disease


#3 atrial fibrillation on anticoagulation


#4 hypertension


#5 diabetes


#6 neuropathy


#7 hypothyroidism


#8 hyperlipidemia


#9 chronic debility, nonambulatory


#10 anemia


Plan: 





 Appreciate preoperative evaluations and recommendations.  At this time her 

hemoglobin is 6.5.  We will transfuse 1 unit of packed red cells today, and 

recheck.  She will likely need repeat transfusion tomorrow in the perioperative 

timeframes for amputation.  Again Given her peripheral arterial disease with 

previously failed bypasses, immobility and the fact she does not ambulate, we 

would go forth with a above-knee amputation.  This is discussed with the patient

and her grandson who both seemingly agreement willing to proceed.








If there is any concern or further testing for preoperative evaluation, will 

hold off on procedure for tomorrow and plan accordingly and following days given

no evidence of systemic issue from wet gangrene





Please call with any questions or concerns [phone] (pager)

## 2019-08-30 LAB
ALBUMIN SERPL-MCNC: 3 G/DL (ref 3.5–5)
ALP SERPL-CCNC: 81 U/L (ref 38–126)
ALT SERPL-CCNC: 17 U/L (ref 9–52)
ANION GAP SERPL CALC-SCNC: 10 MMOL/L
AST SERPL-CCNC: 15 U/L (ref 14–36)
BASOPHILS # BLD AUTO: 0.1 K/UL (ref 0–0.2)
BASOPHILS NFR BLD AUTO: 1 %
BUN SERPL-SCNC: 55 MG/DL (ref 7–17)
CALCIUM SPEC-MCNC: 8.7 MG/DL (ref 8.4–10.2)
CHLORIDE SERPL-SCNC: 108 MMOL/L (ref 98–107)
CO2 SERPL-SCNC: 22 MMOL/L (ref 22–30)
EOSINOPHIL # BLD AUTO: 0.4 K/UL (ref 0–0.7)
EOSINOPHIL NFR BLD AUTO: 3 %
ERYTHROCYTE [DISTWIDTH] IN BLOOD BY AUTOMATED COUNT: 3.13 M/UL (ref 3.8–5.4)
ERYTHROCYTE [DISTWIDTH] IN BLOOD BY AUTOMATED COUNT: 3.63 M/UL (ref 3.8–5.4)
ERYTHROCYTE [DISTWIDTH] IN BLOOD: 14.5 % (ref 11.5–15.5)
ERYTHROCYTE [DISTWIDTH] IN BLOOD: 15.2 % (ref 11.5–15.5)
GLUCOSE BLD-MCNC: 108 MG/DL (ref 75–99)
GLUCOSE BLD-MCNC: 163 MG/DL (ref 75–99)
GLUCOSE BLD-MCNC: 87 MG/DL (ref 75–99)
GLUCOSE BLD-MCNC: 88 MG/DL (ref 75–99)
GLUCOSE BLD-MCNC: 99 MG/DL (ref 75–99)
GLUCOSE SERPL-MCNC: 75 MG/DL (ref 74–99)
HCT VFR BLD AUTO: 26.1 % (ref 34–46)
HCT VFR BLD AUTO: 32.1 % (ref 34–46)
HGB BLD-MCNC: 11 GM/DL (ref 11.4–16)
HGB BLD-MCNC: 8.1 GM/DL (ref 11.4–16)
INR PPP: 1.6 (ref ?–1.2)
LYMPHOCYTES # SPEC AUTO: 1.8 K/UL (ref 1–4.8)
LYMPHOCYTES NFR SPEC AUTO: 15 %
MCH RBC QN AUTO: 25.9 PG (ref 25–35)
MCH RBC QN AUTO: 30.4 PG (ref 25–35)
MCHC RBC AUTO-ENTMCNC: 31 G/DL (ref 31–37)
MCHC RBC AUTO-ENTMCNC: 34.3 G/DL (ref 31–37)
MCV RBC AUTO: 83.6 FL (ref 80–100)
MCV RBC AUTO: 88.5 FL (ref 80–100)
MONOCYTES # BLD AUTO: 1.1 K/UL (ref 0–1)
MONOCYTES NFR BLD AUTO: 9 %
NEUTROPHILS # BLD AUTO: 8.7 K/UL (ref 1.3–7.7)
NEUTROPHILS NFR BLD AUTO: 71 %
PLATELET # BLD AUTO: 358 K/UL (ref 150–450)
PLATELET # BLD AUTO: 367 K/UL (ref 150–450)
POTASSIUM SERPL-SCNC: 4.2 MMOL/L (ref 3.5–5.1)
PROT SERPL-MCNC: 6.9 G/DL (ref 6.3–8.2)
PT BLD: 15.9 SEC (ref 9–12)
SODIUM SERPL-SCNC: 140 MMOL/L (ref 137–145)
WBC # BLD AUTO: 12.3 K/UL (ref 3.8–10.6)
WBC # BLD AUTO: 19.4 K/UL (ref 3.8–10.6)

## 2019-08-30 PROCEDURE — 04PY0YZ REMOVAL OF OTHER DEVICE FROM LOWER ARTERY, OPEN APPROACH: ICD-10-PCS

## 2019-08-30 PROCEDURE — 0Y6C0Z1 DETACHMENT AT RIGHT UPPER LEG, HIGH, OPEN APPROACH: ICD-10-PCS

## 2019-08-30 RX ADMIN — FUROSEMIDE SCH: 40 TABLET ORAL at 07:02

## 2019-08-30 RX ADMIN — LORATADINE SCH: 10 TABLET ORAL at 07:03

## 2019-08-30 RX ADMIN — DOCUSATE SODIUM AND SENNOSIDES SCH: 50; 8.6 TABLET ORAL at 07:03

## 2019-08-30 RX ADMIN — INSULIN ASPART SCH: 100 INJECTION, SOLUTION INTRAVENOUS; SUBCUTANEOUS at 07:02

## 2019-08-30 RX ADMIN — INSULIN ASPART SCH: 100 INJECTION, SOLUTION INTRAVENOUS; SUBCUTANEOUS at 17:03

## 2019-08-30 RX ADMIN — ASPIRIN 81 MG CHEWABLE TABLET SCH: 81 TABLET CHEWABLE at 07:02

## 2019-08-30 RX ADMIN — LEVOTHYROXINE SODIUM SCH: 50 TABLET ORAL at 05:51

## 2019-08-30 RX ADMIN — Medication SCH: at 07:02

## 2019-08-30 RX ADMIN — POTASSIUM CHLORIDE SCH: 750 TABLET, EXTENDED RELEASE ORAL at 07:03

## 2019-08-30 RX ADMIN — LEVOTHYROXINE SODIUM SCH MCG: 50 TABLET ORAL at 05:49

## 2019-08-30 RX ADMIN — FLUTICASONE PROPIONATE SCH SPRAY: 50 SPRAY, METERED NASAL at 07:12

## 2019-08-30 RX ADMIN — METOPROLOL TARTRATE SCH MG: 50 TABLET, FILM COATED ORAL at 20:46

## 2019-08-30 RX ADMIN — INSULIN DETEMIR SCH: 100 INJECTION, SOLUTION SUBCUTANEOUS at 17:03

## 2019-08-30 RX ADMIN — LOSARTAN POTASSIUM SCH MG: 50 TABLET, FILM COATED ORAL at 07:11

## 2019-08-30 RX ADMIN — SODIUM CHLORIDE SCH DROPS: 50 SOLUTION/ DROPS OPHTHALMIC at 21:44

## 2019-08-30 RX ADMIN — SPIRONOLACTONE SCH: 25 TABLET, FILM COATED ORAL at 07:03

## 2019-08-30 RX ADMIN — INSULIN ASPART SCH: 100 INJECTION, SOLUTION INTRAVENOUS; SUBCUTANEOUS at 12:46

## 2019-08-30 RX ADMIN — METOPROLOL TARTRATE SCH MG: 50 TABLET, FILM COATED ORAL at 07:11

## 2019-08-30 RX ADMIN — PRAVASTATIN SODIUM SCH: 80 TABLET ORAL at 17:03

## 2019-08-30 RX ADMIN — HYDROCODONE BITARTRATE AND ACETAMINOPHEN PRN EACH: 5; 325 TABLET ORAL at 20:45

## 2019-08-30 RX ADMIN — LINAGLIPTIN SCH: 5 TABLET, FILM COATED ORAL at 17:03

## 2019-08-30 RX ADMIN — SODIUM CHLORIDE SCH DROPS: 50 SOLUTION/ DROPS OPHTHALMIC at 07:12

## 2019-08-30 NOTE — P.CRDCN
History of Present Illness


History of present illness: 





This is a pleasant 84-year-old  female past medical history significant

for persistent atrial fibrillation on long-term anticoagulation, hypertension, 

dyslipidemia, peripheral arterial disease s/p and diabetes mellitus.  We've been

asked to see her in consultation secondary to preoperative evaluation.  She is 

scheduled to undergo above-the-knee amputation today with Dr. Perez. She follows

in the office with Dr. Valdez. She is seen and examined laying flat in bed in no

acute distress.  She denies symptoms of chest discomfort, shortness of breath, 

dizziness or palpitations.  Chest x-ray obtained today reveals cardiomegaly with

small tiny bilateral pleural effusions.  Laboratory data reviewed, WBC 12.3, 

hemoglobin 8.1, platelets 250, INR 1.6, sodium 140, potassium 4.2, creatinine 

2.47 with a GFR of 17, cardiac enzymes negative 3, proBNP on admission 6210, 

TSH 3.45. There is no EKG in the chart for review.  Echocardiogram obtained 

reveals preserved LV systolic function with ejection fraction 55-60%, mild 

concentric LVH, mild aortic regurgitation, moderate mitral regurgitation, 

moderate tricuspid regurgitation and mild pulmonary hypertension with RVSP 40 

mmHg. She has undergone a blood transfusion secondary to anemia noted on 

admission. 





At the time of my exam:


CONSTITUTIONAL: Denies fever. Denies chills.


EYES: Denies blurred vision. Denies vision changes. Denies eye pain.


EARS, NOSE, MOUTH & THROAT: Denies headache. Denies sore throat. Denies ear 

pain.


CARDIOVASCULAR: Denies chest pain. Denies shortness of breath. Denies orthopnea.

Denies PND. Denies palpitations.


RESPIRATORY: Denies cough. 


GASTROINTESTINAL: Denies abdominal pain. Denies diarrhea. Denies constipation. 

Denies nausea. Denies vomiting.


MUSCULOSKELETAL: Denies myalgias.


INTEGUMENTARY: Denies pruitis. Denies rash.


NEUROLOGIC: Denies numbness. Denies tingling. Denies weakness.


PSYCHIATRIC: Denies anxiety. Denies depression.


ENDOCRINE: Denies fatigue. Denies weight change. Denies polydipsia. Denies 

polyurina.


GENITOURINARY: Denies burning, hematuria or urgency with micturation.


HEMATOLOGIC: Denies history of anemia. Denies bleeding. 





Blood pressure 127/74 heart rate 88 afebrile maintaining oxygen saturation on 

room air


GENERAL: This is a 84-year-old  female in no apparent distress at the 

time of my examination.


HEENT: Head is atraumatic, normocephalic. Pupils are equal, round. Sclerae 

anicteric. Conjunctivae are clear. Mucous membranes of the mouth are moist. Neck

is supple. There is no jugular venous distention. No carotid bruit is heard.


LUNGS: Clear to auscultation no wheezes, rales or rhonchi. No chest wall 

tenderness is noted on palpation or with deep breathing.


HEART: Irregular rate and rhythm with systolic ejection murmur at the left 

sternal, no rubs or gallops. S1 and S2 heard.


ABDOMEN: Soft, nontender. Bowel sounds are heard. No organomegaly noted.


EXTREMITIES: Multiple ulcerations bilaterally, foul odor.


VASCULAR: No palpable pulses manually bilaterally. 


NEUROLOGIC: Patient is awake, alert and oriented x3.


 


ASSESSMENT


Peripheral arterial disease scheduled for above-the-knee amputation of the right

leg.


Gangrene wound of the right heel


Persistent atrial fibrillation on long term anticoagulation with coumadin


Hypertension


Diabetes mellitus


Dyslipidemia


Anemia, requiring transfusion





PLAN


Clinically she is not in overt heart failure and has no symptoms of angina. 


Obtain baseline EKG>


She has multiple co-morbid conditions placing her at a higher risk however there

are no absolute contraindications at this time. 


Resume coumadin as soon as possible per vascular surgery. 


Continue lasix, losartan, lopressor, pravastatin, aldactone, amlodipine and 

hydralazine as previously ordered. 


We will continue to follow in the post-operative phase. 


Thank you kindly for this consultation.





Nurse Practitioner note has been reviewed, I agree with a documented findings 

and plan of care.  Patient was seen and examined.








Past Medical History


Past Medical History: Atrial Fibrillation, Diabetes Mellitus, Hyperlipidemia, 

Hypertension, Thyroid Disorder


Additional Past Medical History / Comment(s): peripheral neuropathy, UTIs, 

urinary incontinence, OA bilateral hands and back, PVD, cervical cancer


History of Any Multi-Drug Resistant Organisms: MRSA


Date of last positivie culture/infection: 2016


MDRO Source:: RIGHT FOOT


Past Surgical History: Hysterectomy, Tonsillectomy


Additional Past Surgical History / Comment(s): R leg bypass surgery, bilateral c

ataract removal


Past Anesthesia/Blood Transfusion Reactions: No Reported Reaction


Past Psychological History: No Psychological Hx Reported


Additional Psychological History / Comment(s): Pt states she lives at [facility].  States uses a wheelchair to get around.


Smoking Status: Never smoker


Past Alcohol Use History: None Reported


Additional Past Alcohol Use History / Comment(s): Patient resides at [facility] and is wheelchair bound.


Past Drug Use History: None Reported





- Past Family History


  ** Mother


Family Medical History: CVA/TIA


Additional Family Medical History / Comment(s): Mother  in her 70's.





  ** Father


Family Medical History: Hypertension


Additional Family Medical History / Comment(s): Father  in his 70's.





Medications and Allergies


                                Home Medications











 Medication  Instructions  Recorded  Confirmed  Type


 


Aspirin 81 mg PO DAILY 16 History


 


Levothyroxine Sodium [Synthroid] 50 mcg PO QAM 16 History


 


Pravastatin Sodium [Pravachol] 80 mg PO DAILY@1800 16 History


 


Sennosides-Docusate Sodium 1 tab PO DAILY 17 History





[Senokot-S]    


 


Multivitamins, Thera [Multivitamin 1 tab PO HS 10/01/18 08/28/19 History





(formulary)]    


 


Potassium Chloride ER [K-Dur 10] 10 meq PO DAILY 10/01/18 08/28/19 History


 


Sodium Chloride 5% Ophth Soln 1 drop BOTH EYES BID 10/01/18 08/28/19 History





[Vahid 128]    


 


Furosemide [Lasix] 40 mg PO DAILY #0 10/05/18 08/28/19 Rx


 


Amino Acids/Protein Hydrolys 30 ml PO DAILY 18 History





[Pro-Stat Supplement]    


 


amLODIPine BESYLATE 5 mg PO DAILY 18 History


 


Spironolactone [Aldactone] 12.5 mg PO DAILY 18 History


 


Cranberry 450mg 450 mg PO DAILY 19 History


 


Losartan Potassium 100 mg PO DAILY 19 History


 


Metoprolol Tartrate [Lopressor] 100 mg PO BID 19 History


 


Warfarin [Coumadin] 2.5 mg PO HS 19 History


 


hydrALAZINE HCL [Apresoline] 100 mg PO TID 19 History


 


sitaGLIPtin PHOSPHATE [Januvia] 50 mg PO DAILY@1800 19 History


 


INSULIN LISPRO (humaLOG) [humaLOG] 6 units SQ AC-TID #0 03/15/19 08/28/19 Rx


 


Amoxic-Pot Clav 875-125Mg 1 tab PO Q12HR 19 History





[Augmentin 875-125]    


 


Bisacodyl 5 mg PO DAILY PRN 19 History


 


Cholecalciferol (Vitamin D3) 2,000 unit PO DAILY 19 History





[Vitamin D3]    


 


Ferrous Sulfate [Feosol] 325 mg PO DAILY 19 History


 


Fluticasone Nasal Spray [Flonase 1 spr EA NOSTRIL DAILY 19 

History





Nasal Spray]    


 


Insulin Detemir (Levemir) [Levemir] 20 unit SQ DAILY@1800 19 

History


 


Loratadine [Claritin] 10 mg PO DAILY 19 History








                                    Allergies











Allergy/AdvReac Type Severity Reaction Status Date / Time


 


No Known Allergies Allergy   Verified 19 13:54














Physical Exam


Vitals: 


                                   Vital Signs











  Temp Pulse Pulse Resp BP BP Pulse Ox


 


 19 07:08  98.5 F   88  15   127/74  99


 


 19 01:20  98.6 F   82  17   125/64  99


 


 19 19:05  98.8 F   76  17   120/64  97


 


 19 16:59  98.0 F  79   16  132/71  


 


 19 14:12  97.6 F  94   16  117/65   100


 


 19 13:42  98.1 F  102 H   16  119/63  


 


 19 13:32  98.1 F  89   16  118/57   100








                                Intake and Output











 19





 22:59 06:59 14:59


 


Intake Total 410 50 50


 


Output Total  700 


 


Balance 410 -650 50


 


Intake:   


 


  Oral 100 50 50


 


  Blood Product 310  


 


    Rc As-3  Unit 310  





    [id number]   


 


Output:   


 


  Urine  700 


 


Other:   


 


  Voiding Method Indwelling Catheter Indwelling Catheter Indwelling Catheter














Results





                                 19 05:57





                                 19 05:57


                                 Cardiac Enzymes











  19 Range/Units





  10:46 05:57 05:57 


 


AST    15  (14-36)  U/L


 


Troponin I  <0.012  <0.012   (0.000-0.034)  ng/mL








                                   Coagulation











  19 Range/Units





  05:57 


 


PT  15.9 H  (9.0-12.0)  sec








                                       CBC











  19 Range/Units





  18:58 05:57 


 


WBC  11.2 H  12.3 H  (3.8-10.6)  k/uL


 


RBC  2.97 L  3.13 L  (3.80-5.40)  m/uL


 


Hgb  7.9 L  8.1 L  (11.4-16.0)  gm/dL


 


Hct  25.3 L  26.1 L  (34.0-46.0)  %


 


Plt Count  358  358  (150-450)  k/uL








                          Comprehensive Metabolic Panel











  19 Range/Units





  05:57 


 


Sodium  140  (137-145)  mmol/L


 


Potassium  4.2  (3.5-5.1)  mmol/L


 


Chloride  108 H  ()  mmol/L


 


Carbon Dioxide  22  (22-30)  mmol/L


 


BUN  55 H  (7-17)  mg/dL


 


Creatinine  2.47 H  (0.52-1.04)  mg/dL


 


Glucose  75  (74-99)  mg/dL


 


Calcium  8.7  (8.4-10.2)  mg/dL


 


AST  15  (14-36)  U/L


 


ALT  17  (9-52)  U/L


 


Alkaline Phosphatase  81  ()  U/L


 


Total Protein  6.9  (6.3-8.2)  g/dL


 


Albumin  3.0 L  (3.5-5.0)  g/dL








                               Current Medications











Generic Name Dose Route Start Last Admin





  Trade Name Freq  PRN Reason Stop Dose Admin


 


Amlodipine Besylate  5 mg  19 09:00  19 07:11





  Norvasc  PO   5 mg





  DAILY URI   Administration


 


Aspirin  81 mg  19 09:00  19 07:02





  Aspirin  PO   Not Given





  DAILY URI  


 


Bisacodyl  5 mg  19 21:16 





  Dulcolax  PO  





  DAILY PRN  





  Constipation  


 


Cholecalciferol  2,000 unit  19 09:00  19 07:02





  Vitamin D3 (25 Mcg = 1000 Iu)  PO   Not Given





  DAILY [facility]  


 


Fluticasone Propionate  1 spray  19 09:00  19 07:12





  Flonase Nasal [place]  EA NOSTRIL   1 spray





  DAILY [facility]   Administration


 


Furosemide  40 mg  19 09:00  19 07:02





  Lasix  PO   Not Given





  DAILY [facility]  


 


Furosemide  20 mg  19 08:22 





  Lasix  IV  19 08:23 





  ONCE PRN  





  Blood Transfusion Complete  


 


Hydralazine HCl  100 mg  19 22:00  19 07:03





  Apresoline  PO   Not Given





  TID [facility]  


 


Insulin Aspart  6 unit  19 07:30  19 07:02





  Novolog  SQ   Not Given





  AC-TID [facility]  


 


Insulin Detemir  20 unit  19 18:00  19 17:25





  Levemir  SQ   20 unit





  DAILY@1800 [facility]   Administration


 


Levothyroxine Sodium  50 mcg  19 06:30  19 05:51





  Synthroid  PO   Not Given





  QAM@0630 [facility]  


 


Linagliptin  5 mg  19 18:00  19 17:25





  Tradjenta  PO   5 mg





  DAILY@1800 [facility]   Administration


 


Loratadine  10 mg  19 09:00  19 07:03





  Claritin  PO   Not Given





  DAILY [facility]  


 


Losartan Potassium  100 mg  19 09:00  19 07:11





  Cozaar  PO   100 mg





  DAILY [facility]   Administration


 


Metoprolol Tartrate  100 mg  19 09:00  19 07:11





  Lopressor  PO   100 mg





  BID [facility]   Administration


 


Naloxone HCl  0.2 mg  19 14:35 





  Narcan  IV  





  Q2M PRN  





  Opioid Reversal  


 


Potassium Chloride  10 meq  19 09:00  19 07:03





  K-Dur 10  PO   Not Given





  DAILY [facility]  


 


Pravastatin Sodium  80 mg  19 18:00  19 17:25





  Pravachol  PO   80 mg





  DAILY@1800 [facility]   Administration


 


Senna/Docusate Sodium  1 each  19 09:00  19 07:03





  Senokot-S  PO   Not Given





  DAILY [facility]  


 


Sodium Chloride  1 drops  19 09:00  19 07:12





  Vahid 128  BOTH EYES   1 drops





  BID [facility]   Administration


 


Spironolactone  12.5 mg  19 09:00  19 07:03





  Aldactone  PO   Not Given





  DAILY [facility]  








                                Intake and Output











 19





 22:59 06:59 14:59


 


Intake Total 410 50 50


 


Output Total  700 


 


Balance 410 -650 50


 


Intake:   


 


  Oral 100 50 50


 


  Blood Product 310  


 


     As-3  Unit 310  





    [id number]   


 


Output:   


 


  Urine  700 


 


Other:   


 


  Voiding Method Indwelling Catheter Indwelling Catheter Indwelling Catheter








                                        





                                 19 05:57 





                                 19 05:57

## 2019-08-30 NOTE — P.PN
Subjective


Progress Note Date: 08/30/19





This is an 84-year-old pleasant female followed by Dr. Pardo known history of 

diabetes mellitus type 2 requiring insulin, with PAD and other complications 

peripheral neuropathy hypertension, hypothyroidism atrial fibrillation, PAD, 

chronic gangrene/chronic ulcer diabetic foot ulcers right heel at least since 

March 2019, admitted to [facility] as the ulcers have failed to

heal, and they have discussed the necessity of amputation, and requested to be 

admitted for the vascular surgeon with Dr. Perze, he she was also seen by Dr. Ross in the past.  Cardiology was requested to see the patient consultation 

for preop, patient denies any history of CHF in the past, no history off CVA, no

documented history of myocardial infarction, we are going to request an EKG him 

up preop.  ProBNP, echocardiogram, with anticipation that the surgery would be 

done in the next 1-2 days.  Coumadin will be held,





8/29: Dr. Perez is planning for above-the-knee amputation tomorrow.  Coumadin 

remains on hold.  Cardiology consult requested. Echocardiogram reveals EF 55-

60%, mild concentric left ventricular hypertrophy, mild aortic regurgitation, 

moderate mitral regurgitation, moderate tricuspid regurgitation, mild pulmonary 

hypertension. Chest x-ray reveals chronic changes and cardiomegaly with small 2 

tiny bilateral pleural effusions diminished in size from prior.  Patient has 

been afebrile, heart rate 77, blood pressure 115/63 and pulse ox 99% on room 

air.  Repeat lab work reveals normal white count of 9.7, hemoglobin 6.5 and 1 

unit of packed RBCs will be ordered for transfusion.  BUN 56 and creatinine 

2.33.  Electrolytes within normal limits.  Blood sugar running between 121 and 

167.  Hemoglobin A1c is pending.Patient denies any new complaints.  Perez 

catheter is in place.  Sister is at the bedside.





8/30: Human globin this morning is 8.1 and Dr. Perez has ordered another unit of

packed RBCs.  She is scheduled for amputation this afternoon.  She has been seen

by cardiology this morning cleared for surgery.  Repeat lab work reveals white 

count of 12.3, INR 1.6, BUN 55 and creatinine 2.47, blood sugars running .

 Patient's sisters also at the bedside.  All questions have been answered.  

Patient denies any new complaints.











Objective





- Vital Signs


Vital signs: 


                                   Vital Signs











Temp  98.5 F   08/30/19 07:08


 


Pulse  88   08/30/19 07:08


 


Resp  15   08/30/19 07:08


 


BP  127/74   08/30/19 07:08


 


Pulse Ox  99   08/30/19 07:08








                                 Intake & Output











 08/29/19 08/30/19 08/30/19





 18:59 06:59 18:59


 


Intake Total 530 150 50


 


Output Total 900 700 


 


Balance -370 -550 50


 


Weight 86.183 kg  


 


Intake:   


 


  Oral 220 150 50


 


  Blood Product 310  


 


    Rc As-3  Unit 310  





    [id number]   


 


Output:   


 


  Urine 900 700 


 


Other:   


 


  Voiding Method  Indwelling Catheter Indwelling Catheter


 


  # Voids 3  














- Exam





 Review of Systems 


Constitutional: No fever, no chills, no night sweats.  No weight change.  

Reports weakness, fatigue or lethargy.  No daytime sleepiness.


EENT: No headache.  No blurred vision or double vision, no loss of vision.  No 

loss of Hearing, no ringing in the ears, no dizziness.  No nasal drainage or 

congestion.  No epistaxis.  No sore throat.


Lungs: No shortness of breath, cough, no sputum production.  No wheezing.


Cardiovascular: No chest pain, no lower extremity edema.  No palpitations.  No 

paroxysmal nocturnal dyspnea.  No orthopnea.  No lightheadedness or dizziness.  

No syncopal episodes.


Abdominal: No abdominal pain.  No nausea, vomiting.  No diarrhea.  No 

constipation.  No bloody or tarry stools..  No loss of appetite.


Genitourinary: No dysuria, increased frequency, urgency.  No urinary retention.


Musculoskeletal: No myalgias.  No muscle weakness, no gait dysfunction, no 

frequent falls.  No back pain.  No neck pain.


Integumentary: Reports wounds, no lesions.  No rash or pruritus.  No unusual bru

ising.  No change in hair or nails.


Neurologic: No aphasia. No facial droop. No change in mentation. No head injury.

No headache. No paralysis. No paresthesia.


Psychiatric: No depression.  No anxiety.  No mood swings.


Endocrine: No abnormal blood sugars.  No weight change.  No excessive sweating 

or thirst.  No cold intolerance.  











- Constitutional


General appearance: cooperative, no acute distress, resting in bed, obese





- EENT


Eyes: anicteric sclerae, EOMI, dentition normal, normal appearance


ENT: hearing grossly normal, NA/AT, normal oropharynx





- Neck


Neck: normal ROM





- Respiratory


Respiratory: bilateral: CTA, negative: diminished, dullness, rales





- Cardiovascular


Rhythm: regular


Heart sounds: normal: S1, S2


Abnormal Heart Sounds: no systolic murmur, no diastolic murmur, no rub, no S3 

Gallop, no S4 Gallop, no click, no other





- Gastrointestinal


General gastrointestinal: normal bowel sounds, soft





- Integumentary


Integumentary: normal, ulcer (Has significant calcaneal ulceration with 

gangrene, mainly moist, PAD is nonpalpable, there is marked alteration in the 

wound, foul order)





- Neurologic


Neurologic: CNII-XII intact





- Musculoskeletal


Musculoskeletal: strength equal bilaterally





- Psychiatric


Psychiatric: A&O x's 3, appropriate affect, intact judgment & insight





- Labs


CBC & Chem 7: 


                                 08/30/19 05:57





                                 08/30/19 05:57


Labs: 


                  Abnormal Lab Results - Last 24 Hours (Table)











  08/29/19 08/29/19 08/29/19 Range/Units





  05:32 10:46 11:17 


 


WBC     (3.8-10.6)  k/uL


 


RBC     (3.80-5.40)  m/uL


 


Hgb     (11.4-16.0)  gm/dL


 


Hct     (34.0-46.0)  %


 


Neutrophils #     (1.3-7.7)  k/uL


 


Monocytes #     (0-1.0)  k/uL


 


PT     (9.0-12.0)  sec


 


INR     (<1.2)  


 


Chloride     ()  mmol/L


 


BUN     (7-17)  mg/dL


 


Creatinine     (0.52-1.04)  mg/dL


 


POC Glucose (mg/dL)    131 H  (75-99)  mg/dL


 


Iron  12 L    ()  ug/dL


 


Iron Saturation  4.33 L    (12.00-45.00)   


 


Albumin     (3.5-5.0)  g/dL


 


Crossmatch   See Detail   














  08/29/19 08/29/19 08/29/19 Range/Units





  16:58 18:58 20:40 


 


WBC   11.2 H   (3.8-10.6)  k/uL


 


RBC   2.97 L   (3.80-5.40)  m/uL


 


Hgb   7.9 L   (11.4-16.0)  gm/dL


 


Hct   25.3 L   (34.0-46.0)  %


 


Neutrophils #     (1.3-7.7)  k/uL


 


Monocytes #     (0-1.0)  k/uL


 


PT     (9.0-12.0)  sec


 


INR     (<1.2)  


 


Chloride     ()  mmol/L


 


BUN     (7-17)  mg/dL


 


Creatinine     (0.52-1.04)  mg/dL


 


POC Glucose (mg/dL)  233 H   120 H  (75-99)  mg/dL


 


Iron     ()  ug/dL


 


Iron Saturation     (12.00-45.00)   


 


Albumin     (3.5-5.0)  g/dL


 


Crossmatch     














  08/30/19 08/30/19 08/30/19 Range/Units





  05:57 05:57 05:57 


 


WBC   12.3 H   (3.8-10.6)  k/uL


 


RBC   3.13 L   (3.80-5.40)  m/uL


 


Hgb   8.1 L   (11.4-16.0)  gm/dL


 


Hct   26.1 L   (34.0-46.0)  %


 


Neutrophils #   8.7 H   (1.3-7.7)  k/uL


 


Monocytes #   1.1 H   (0-1.0)  k/uL


 


PT  15.9 H    (9.0-12.0)  sec


 


INR  1.6 H    (<1.2)  


 


Chloride    108 H  ()  mmol/L


 


BUN    55 H  (7-17)  mg/dL


 


Creatinine    2.47 H  (0.52-1.04)  mg/dL


 


POC Glucose (mg/dL)     (75-99)  mg/dL


 


Iron     ()  ug/dL


 


Iron Saturation     (12.00-45.00)   


 


Albumin    3.0 L  (3.5-5.0)  g/dL


 


Crossmatch     








                      Microbiology - Last 24 Hours (Table)











 08/28/19 14:26 Blood Culture - Preliminary





 Blood    No Growth after 24 hours














Assessment and Plan


Plan: 





1.  Diabetic foot ulcer, involving right heel, with wet gangrene, known history 

of PAD, failed to heal despite adequate length of time as an outpatient, 

approximately 6 months.  Patient scheduled for above-the-knee amputation today 

with Dr. Preez.  Preop cardiology clearance requested.  Coumadin is on hold.  

Echocardiogram as above.  





2. Iron deficiency anemia or anemia of chronic disease.  Iron studies pending.  

Transfuse 1 unit of packed RBCs followed by Lasix 20 mg IV push.  





3.  Paroxysmal atrial fibrillation on chronic Coumadin for anticoagulation, 

Coumadin will be held today in this patient for her proposed procedure for below

the knee amputation, patient would be receiving IV heparin,





4.  CKD stage IV, creatinine of 2.34, avoid nephrotoxins and hypotension,





5.  Diabetes mellitus type 2 with diabetic neuropathy.  Continue Lantus 20 units

at bedtime, hold patient's scheduled 6 units with meals.  Continue NovoLog scale

before meals and at bedtime.  Hemoglobin A1c.


 


6.  Hypertension.  Continue amlodipine 5 mg daily, losartan 100 mg daily, 

Lopressor 100 mg twice daily, hydralazine 100 mg 3 times daily, Lasix 40 orally 

milligrams daily, Aldactone 12.5 mg daily.  





7.  Hyperlipidemia.  Continue Pravachol 80 mg daily.





8.  Mild protein calorie malnutrition.  Continue Glucerna.





9.  Generalized debility.  PT and OT to follow after surgery.





10.  DVT prophylaxis.  Coumadin/heparin drip.





11.  GI prophylaxis.  Pepcid





CODE STATUS: Full code








Discharge plan: Return to [facility] under the care of Dr. Pardo.





Impression and plan of care have been directed as dictated by the signing 

physician.  Candy Kumar nurse practitioner acting as scribe for signing 

physician.

## 2019-08-30 NOTE — P.OP
Date of Procedure: 08/30/19


Description of Procedure: 


SURGEON:  Christy Perez DO


ASSISTANT: None


PREOPERATIVE DIAGNOSIS:  Infected gangrene of the right heel, previous failed 

bypasses, peripheral vascular disease, diabetes, hypertension


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 

femoral artery anastomosis


OPERATION:   Right above-knee amputation


ANESTHESIA: Gen. endotracheal tube


ESTIMATED BLOOD LOSS: 500 mL


SPECIMENS REMOVED: Right leg, culture of bypass graft


COMPLICATIONS: None





OPERATIVE FINDINGS: The patient is an 84-year-old female with a past medical 

history including diabetes and peripheral vascular disease including multiple 

failed bypass grafts.  She has a infected gangrenous ulcer on her right heel.  

Multiple previous attempted salvage were performed by another physician and at 

this point it was recommended the patient undergo an amputation.  When seen in 

the office she is stating that she needs an amputation and she does not want to 

go under any further revascularization attempts or long/prolonged wound care 

treatments.  She understands this course and does not ambulate.  She is 

wheelchair-bound.  Occasionally she stands to help with activities of daily 

living but essentially does not utilize this leg at all.  At that point 

discussion was had with the grandson, her power of  as well as the 

patient regarding going forward with an above-knee amputation.  They'll 

seemingly understand and are willing to proceed.  Risks and benefits were 

discussed.





During the procedure the bypass graft was noted at the medial thigh, upon 

incising into it there was semi-purulent drainage.  This was cultured.  With 

gentle traction to ligated the proximal portion the portion of the anastomotic 

line and residual proximal portion of the graft was avulsed quite simply being 

immediately there was some degree of pseudoaneurysm at this area.  Initially 

there was no evidence of bleeding from this area and closure was performed.  It 

wasn't until the staple line placement that there was an abnormal amount of 

oozing from this medial portion of the incision.  At that point the entire 

incision was reopened and the area of the superficial femoral artery where the 

avulsed graft came from was suture ligated.





DESCRIPTION OF PROCEDURE:  The patient was brought to the operating room, the 

operative leg was prepped and draped in the usual sterile manner. Incision was 

made above the knee and for the anterior flap an incision was made deepened in 

the skin, fat, and fascia and then the posterior flap incision was made, and 

deepened in the skin, fat, and fascia. Muscles were divided and neurovascular 

bundle was identified.  The previous bypass graft was identified and transected.

 There was semi-purulent drainage from the distal portion, uncertain of this was

just chronic breakdown versus infection.  This was cultured.  U attempts at 

ligating the proximal portion, with minimal traction the anastomotic line and 

residual proximal portion of the graft was levels with Prolene suture.  There 

was no evidence of bleeding at that point.  The portion of the native vessel 

identified was heavily calcified and suture ligated with 0 silk.  That point the

bone was cleared circumferentially a periosteal elevator was utilized.  An 

oscillating saw was utilized to divide the femur.  Using electrocautery the 

posterior flap was created.  The saw was used to bevel the anterior portion of 

the femur.  A rasp was utilized to smooth edges.  The specimen was removed and 

the wound was copiously irrigated with  saline.  At that point hemostasis 

appeared adequate.  The fascia was reapproximated with interrupted vertical 

mattress sutures of 2-0 Vicryl.  The deep dermal tissues were reapproximated 

with interrupted vertical mattress sutures of 3-0 Vicryl.  Staples were placed 

on the skin.  Upon cleansing the skin, there was noted to be increased amount of

blood through the incision with any applied pressure.  At that point the 

incision was then reopened and the previously identified area of the superficial

femoral artery, where the avulsed bypass graft was, was noted to be bleeding.  

The area was dissected circumferentially and the artery was circumferentially 

ligated.  There is no further evidence of active bleeding.  Previous steps were 

then taken again for closure.  Staples were placed.  Dressing was placed.  The 

patient was extubated and taken back to PACU in stable condition having 

tolerated the procedure well.

## 2019-08-31 LAB
BASOPHILS # BLD AUTO: 0.1 K/UL (ref 0–0.2)
BASOPHILS NFR BLD AUTO: 1 %
EOSINOPHIL # BLD AUTO: 0.1 K/UL (ref 0–0.7)
EOSINOPHIL NFR BLD AUTO: 1 %
ERYTHROCYTE [DISTWIDTH] IN BLOOD BY AUTOMATED COUNT: 3.68 M/UL (ref 3.8–5.4)
ERYTHROCYTE [DISTWIDTH] IN BLOOD: 15.4 % (ref 11.5–15.5)
GLUCOSE BLD-MCNC: 174 MG/DL (ref 75–99)
GLUCOSE BLD-MCNC: 268 MG/DL (ref 75–99)
GLUCOSE BLD-MCNC: 306 MG/DL (ref 75–99)
GLUCOSE BLD-MCNC: 312 MG/DL (ref 75–99)
GLUCOSE BLD-MCNC: 333 MG/DL (ref 75–99)
HCT VFR BLD AUTO: 31.9 % (ref 34–46)
HGB BLD-MCNC: 10.3 GM/DL (ref 11.4–16)
INR PPP: 1.5 (ref ?–1.2)
LYMPHOCYTES # SPEC AUTO: 1.3 K/UL (ref 1–4.8)
LYMPHOCYTES NFR SPEC AUTO: 9 %
MCH RBC QN AUTO: 28.1 PG (ref 25–35)
MCHC RBC AUTO-ENTMCNC: 32.4 G/DL (ref 31–37)
MCV RBC AUTO: 86.7 FL (ref 80–100)
MONOCYTES # BLD AUTO: 1 K/UL (ref 0–1)
MONOCYTES NFR BLD AUTO: 7 %
NEUTROPHILS # BLD AUTO: 11.7 K/UL (ref 1.3–7.7)
NEUTROPHILS NFR BLD AUTO: 82 %
PLATELET # BLD AUTO: 352 K/UL (ref 150–450)
PT BLD: 15 SEC (ref 9–12)
WBC # BLD AUTO: 14.3 K/UL (ref 3.8–10.6)

## 2019-08-31 RX ADMIN — HYDROCODONE BITARTRATE AND ACETAMINOPHEN PRN EACH: 5; 325 TABLET ORAL at 12:08

## 2019-08-31 RX ADMIN — INSULIN DETEMIR SCH UNIT: 100 INJECTION, SOLUTION SUBCUTANEOUS at 17:46

## 2019-08-31 RX ADMIN — INSULIN ASPART SCH UNIT: 100 INJECTION, SOLUTION INTRAVENOUS; SUBCUTANEOUS at 17:30

## 2019-08-31 RX ADMIN — HYDROCODONE BITARTRATE AND ACETAMINOPHEN PRN EACH: 5; 325 TABLET ORAL at 16:02

## 2019-08-31 RX ADMIN — PRAVASTATIN SODIUM SCH MG: 80 TABLET ORAL at 17:30

## 2019-08-31 RX ADMIN — HYDROCODONE BITARTRATE AND ACETAMINOPHEN PRN EACH: 5; 325 TABLET ORAL at 07:20

## 2019-08-31 RX ADMIN — LEVOTHYROXINE SODIUM SCH MCG: 50 TABLET ORAL at 07:13

## 2019-08-31 RX ADMIN — INSULIN ASPART SCH UNIT: 100 INJECTION, SOLUTION INTRAVENOUS; SUBCUTANEOUS at 17:46

## 2019-08-31 RX ADMIN — ASPIRIN 81 MG CHEWABLE TABLET SCH MG: 81 TABLET CHEWABLE at 08:28

## 2019-08-31 RX ADMIN — INSULIN ASPART SCH UNIT: 100 INJECTION, SOLUTION INTRAVENOUS; SUBCUTANEOUS at 21:28

## 2019-08-31 RX ADMIN — INSULIN ASPART SCH UNIT: 100 INJECTION, SOLUTION INTRAVENOUS; SUBCUTANEOUS at 07:12

## 2019-08-31 RX ADMIN — Medication SCH UNIT: at 08:29

## 2019-08-31 RX ADMIN — FUROSEMIDE SCH MG: 40 TABLET ORAL at 08:29

## 2019-08-31 RX ADMIN — FLUTICASONE PROPIONATE SCH: 50 SPRAY, METERED NASAL at 08:37

## 2019-08-31 RX ADMIN — DOCUSATE SODIUM AND SENNOSIDES SCH EACH: 50; 8.6 TABLET ORAL at 08:28

## 2019-08-31 RX ADMIN — LOSARTAN POTASSIUM SCH MG: 50 TABLET, FILM COATED ORAL at 08:29

## 2019-08-31 RX ADMIN — HYDROCODONE BITARTRATE AND ACETAMINOPHEN PRN EACH: 5; 325 TABLET ORAL at 20:36

## 2019-08-31 RX ADMIN — METOPROLOL TARTRATE SCH MG: 50 TABLET, FILM COATED ORAL at 20:27

## 2019-08-31 RX ADMIN — SODIUM CHLORIDE SCH: 50 SOLUTION/ DROPS OPHTHALMIC at 21:00

## 2019-08-31 RX ADMIN — SODIUM CHLORIDE SCH DROPS: 50 SOLUTION/ DROPS OPHTHALMIC at 08:29

## 2019-08-31 RX ADMIN — LINAGLIPTIN SCH MG: 5 TABLET, FILM COATED ORAL at 17:30

## 2019-08-31 RX ADMIN — METOPROLOL TARTRATE SCH MG: 50 TABLET, FILM COATED ORAL at 08:28

## 2019-08-31 RX ADMIN — INSULIN ASPART SCH UNIT: 100 INJECTION, SOLUTION INTRAVENOUS; SUBCUTANEOUS at 12:08

## 2019-08-31 RX ADMIN — LORATADINE SCH MG: 10 TABLET ORAL at 08:29

## 2019-08-31 RX ADMIN — SPIRONOLACTONE SCH MG: 25 TABLET, FILM COATED ORAL at 08:28

## 2019-08-31 RX ADMIN — POTASSIUM CHLORIDE SCH MEQ: 750 TABLET, EXTENDED RELEASE ORAL at 08:29

## 2019-08-31 NOTE — P.PN
Subjective


Progress Note Date: 08/31/19


Principal diagnosis: 





right lower extremity wet gangrene s/p AKA





Patient seen and examined.  Complaining of pain a the amputation site which is 

controlled with pain medications.  Denies any fevers, chills, nausea, vomiting, 

chest pain or shortness of breath.





Objective





- Vital Signs


Vital signs: 


                                   Vital Signs











Temp  96.8 F L  08/31/19 08:00


 


Pulse  94   08/31/19 08:00


 


Resp  16   08/31/19 08:00


 


BP  149/67   08/31/19 08:00


 


Pulse Ox  100   08/31/19 08:00








                                 Intake & Output











 08/30/19 08/31/19 08/31/19





 18:59 06:59 18:59


 


Intake Total 3180 190 210


 


Output Total 1650 700 


 


Balance 1530 -510 210


 


Weight  78.5 kg 


 


Intake:   


 


  IV 2150  


 


  Intake, IV Titration  190 210





  Amount   


 


    Lactated Ringers 1,000 ml  140 160





    @ 0 mls/hr IV .STK-MED   





    ONE Rx#:[id number]   


 


    ceFAZolin 2 gm In Sodium  50 50





    Chloride 0.9% 50 ml @ 100   





    mls/hr IVPB Q8HR [facility] Rx#   





    [id number]   


 


  Oral 100  


 


  Blood Product 930  


 


    Rc As-1  Unit 310  





    [id number]   


 


    Rc As-1  Unit 310  





    [id number]   


 


    Rc As-1  Unit 310  





    [id number]   


 


Output:   


 


  Urine 1150 700 


 


  Estimated Blood Loss 500  


 


Other:   


 


  Voiding Method Indwelling Catheter Indwelling Catheter Indwelling Catheter














- Exam





Pleasant female in no acute distress lying in bed.


R AKA dressings intact.  No bleeding noted through the dressing.  +tenderness to

palpation.








- Constitutional


General appearance: Present: cooperative





- Labs


CBC & Chem 7: 


                                 08/31/19 05:30





                                 08/30/19 05:57


Labs: 


                  Abnormal Lab Results - Last 24 Hours (Table)











  08/29/19 08/30/19 08/30/19 Range/Units





  10:46 17:17 19:30 


 


WBC    19.4 H  (3.8-10.6)  k/uL


 


RBC    3.63 L  (3.80-5.40)  m/uL


 


Hgb    11.0 L  (11.4-16.0)  gm/dL


 


Hct    32.1 L  (34.0-46.0)  %


 


Neutrophils #     (1.3-7.7)  k/uL


 


PT     (9.0-12.0)  sec


 


INR     (<1.2)  


 


POC Glucose (mg/dL)   108 H   (75-99)  mg/dL


 


Crossmatch  See Detail    














  08/30/19 08/31/19 08/31/19 Range/Units





  21:13 05:30 05:30 


 


WBC    14.3 H  (3.8-10.6)  k/uL


 


RBC    3.68 L  (3.80-5.40)  m/uL


 


Hgb    10.3 L  (11.4-16.0)  gm/dL


 


Hct    31.9 L  (34.0-46.0)  %


 


Neutrophils #    11.7 H  (1.3-7.7)  k/uL


 


PT   15.0 H   (9.0-12.0)  sec


 


INR   1.5 H   (<1.2)  


 


POC Glucose (mg/dL)  163 H    (75-99)  mg/dL


 


Crossmatch     














  08/31/19 Range/Units





  07:05 


 


WBC   (3.8-10.6)  k/uL


 


RBC   (3.80-5.40)  m/uL


 


Hgb   (11.4-16.0)  gm/dL


 


Hct   (34.0-46.0)  %


 


Neutrophils #   (1.3-7.7)  k/uL


 


PT   (9.0-12.0)  sec


 


INR   (<1.2)  


 


POC Glucose (mg/dL)  174 H  (75-99)  mg/dL


 


Crossmatch   








                      Microbiology - Last 24 Hours (Table)











 08/30/19 15:10 Gram Stain - Preliminary





 Other - Other Wound Culture - Preliminary


 


 08/28/19 14:26 Blood Culture - Preliminary





 Blood    No Growth after 48 hours














Assessment and Plan


Assessment: 








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


#2 peripheral arterial disease


#3 atrial fibrillation on anticoagulation


#4 hypertension


#5 diabetes


#6 neuropathy


#7 hypothyroidism


#8 hyperlipidemia


#9 chronic debility, nonambulatory


#10 anemia


Plan: 


 


Pain control


PT/OT to eval and treat


Monitor labs- H/H is stable 


Will change dressings Monday.

## 2019-08-31 NOTE — CONS
CONSULTATION



HISTORY:

The patient is status post right above-knee amputation.  She is otherwise doing well

and is free of symptoms.



EXAM:

Patient is afebrile.  Heart rate is 90 beats per minute.  Blood pressure is 122/59.

Chest exam reveals good air entry bilaterally.  Heart exam reveals first and second

heart sounds.  No gallop.  Exam of extremities reveals right AKA.  Left dorsalis pedis

pulses are palpable.



MEDICATIONS:

The patient is currently on aspirin Norvasc, Lasix, Apresoline, Claritin, Cozaar,

Lopressor, K-Dur, and Pravastatin.



ASSESSMENT:

1. Peripheral vascular disease, status post right above-knee amputation for wet

    gangrene.

2. Persistent atrial fibrillation.

3. Hypertension.

4. Diabetes.

5. Dyslipidemia.



PLAN:

Will resume Coumadin.  Continue the rest of her medications.





MMODL / IJN: [id number] / Job#: [id number]

## 2019-08-31 NOTE — P.PN
Subjective


Progress Note Date: 08/31/19





This is an 84-year-old pleasant female followed by Dr. Pardo known history of 

diabetes mellitus type 2 requiring insulin, with PAD and other complications 

peripheral neuropathy hypertension, hypothyroidism atrial fibrillation, PAD, 

chronic gangrene/chronic ulcer diabetic foot ulcers right heel at least since 

March 2019, admitted to [facility] as the ulcers have failed to

heal, and they have discussed the necessity of amputation, and requested to be 

admitted for the vascular surgeon with Dr. Perez, he she was also seen by Dr. Ross in the past.  Cardiology was requested to see the patient consultation 

for preop, patient denies any history of CHF in the past, no history off CVA, no

documented history of myocardial infarction, we are going to request an EKG him 

up preop.  ProBNP, echocardiogram, with anticipation that the surgery would be 

done in the next 1-2 days.  Coumadin will be held,





8/29: Dr. Perez is planning for above-the-knee amputation tomorrow.  Coumadin 

remains on hold.  Cardiology consult requested. Echocardiogram reveals EF 55-

60%, mild concentric left ventricular hypertrophy, mild aortic regurgitation, 

moderate mitral regurgitation, moderate tricuspid regurgitation, mild pulmonary 

hypertension. Chest x-ray reveals chronic changes and cardiomegaly with small 2 

tiny bilateral pleural effusions diminished in size from prior.  Patient has 

been afebrile, heart rate 77, blood pressure 115/63 and pulse ox 99% on room 

air.  Repeat lab work reveals normal white count of 9.7, hemoglobin 6.5 and 1 

unit of packed RBCs will be ordered for transfusion.  BUN 56 and creatinine 

2.33.  Electrolytes within normal limits.  Blood sugar running between 121 and 

167.  Hemoglobin A1c is pending.Patient denies any new complaints.  Perez 

catheter is in place.  Sister is at the bedside.





8/30: Human globin this morning is 8.1 and Dr. Perez has ordered another unit of

packed RBCs.  She is scheduled for amputation this afternoon.  She has been seen

by cardiology this morning cleared for surgery.  Repeat lab work reveals white 

count of 12.3, INR 1.6, BUN 55 and creatinine 2.47, blood sugars running .

 Patient's sisters also at the bedside.  All questions have been answered.  

Patient denies any new complaints.





8/31 patient examined bedside complains of significant in the right lower stump.

 Vital examination the temperature is 98.7 blood pressure 120/58 leukocytosis 

improved from 91.4-14 hemoglobin has slightly troponin dropped from 11-10.3 

platelets and normal.  Patient is restarted on Coumadin post surgery.  Lantus 

increased to 25 units daily continue Tradjenta and insulin with insulin sliding 

scale.





 Review of Systems 


Constitutional: No fever, no chills, no night sweats.  No weight change.  

Reports weakness, fatigue or lethargy.  No daytime sleepiness.


EENT: No headache.  No blurred vision or double vision, no loss of vision.  No 

loss of Hearing, no ringing in the ears, no dizziness.  No nasal drainage or 

congestion.  No epistaxis.  No sore throat.


Lungs: No shortness of breath, cough, no sputum production.  No wheezing.


Cardiovascular: No chest pain, no lower extremity edema.  No palpitations.  No 

paroxysmal nocturnal dyspnea.  No orthopnea.  No lightheadedness or dizziness.  

No syncopal episodes.


Abdominal: No abdominal pain.  No nausea, vomiting.  No diarrhea.  No 

constipation.  No bloody or tarry stools..  No loss of appetite.


Genitourinary: No dysuria, increased frequency, urgency.  No urinary retention.


Musculoskeletal: Pain in the right lower extremity


Integumentary: Reports wounds, no lesions.  No rash or pruritus.  No unusual 

bruising.  No change in hair or nails.


Neurologic: No aphasia. No facial droop. No change in mentation. No head injury.

No headache. No paralysis. No paresthesia.


Psychiatric: No depression.  No anxiety.  No mood swings.


Endocrine: No abnormal blood sugars.  No weight change.  No excessive sweating 

or thirst.  No cold intolerance.  





Objective





- Vital Signs


Vital signs: 


                                   Vital Signs











Temp  96.2 F L  08/31/19 15:49


 


Pulse  83   08/31/19 15:49


 


Resp  16   08/31/19 15:49


 


BP  133/60   08/31/19 15:49


 


Pulse Ox  99   08/31/19 15:49








                                 Intake & Output











 08/30/19 08/31/19 08/31/19





 18:59 06:59 18:59


 


Intake Total 3180 190 568


 


Output Total 1650 700 


 


Balance 1530 -510 568


 


Weight  78.5 kg 


 


Intake:   


 


  IV 2150  


 


  Intake, IV Titration  190 210





  Amount   


 


    Lactated Ringers 1,000 ml  140 160





    @ 0 mls/hr IV .STK-MED   





    ONE Rx#:[id number]   


 


    ceFAZolin 2 gm In Sodium  50 50





    Chloride 0.9% 50 ml @ 100   





    mls/hr IVPB Q8HR [facility] Rx#   





    [id number]   


 


  Oral 100  358


 


  Blood Product 930  


 


    Rc As-1  Unit 310  





    [id number]   


 


    Rc As-1  Unit 310  





    [id number]   


 


    Rc As-1  Unit 310  





    [id number]   


 


Output:   


 


  Urine 1150 700 


 


  Estimated Blood Loss 500  


 


Other:   


 


  Voiding Method Indwelling Catheter Indwelling Catheter Indwelling Catheter














- Exam





- Constitutional


General appearance: cooperative, no acute distress, resting in bed, obese





- EENT


Eyes: anicteric sclerae, EOMI, dentition normal, normal appearance


ENT: hearing grossly normal, NA/AT, normal oropharynx





- Neck


Neck: normal ROM





- Respiratory


Respiratory: bilateral: CTA, negative: diminished, dullness, rales





- Cardiovascular


Rhythm: regular


Heart sounds: normal: S1, S2


Abnormal Heart Sounds: no systolic murmur, no diastolic murmur, no rub, no S3 

Gallop, no S4 Gallop, no click, no other





- Gastrointestinal


General gastrointestinal: normal bowel sounds, soft





- Integumentary


Integumentary: Normal





- Neurologic


Neurologic: CNII-XII intact





- Musculoskeletal


Musculoskeletal: Right lower extremities done under dressing no oozing or 

drainage seen on the dressing.  Perez catheter and his





- Psychiatric


Psychiatric: A&O x's 3, appropriate affect, intact judgment & insight








- Labs


CBC & Chem 7: 


                                 08/31/19 05:30





                                 08/30/19 05:57


Labs: 


                  Abnormal Lab Results - Last 24 Hours (Table)











  08/29/19 08/30/19 08/30/19 Range/Units





  10:46 17:17 19:30 


 


WBC    19.4 H  (3.8-10.6)  k/uL


 


RBC    3.63 L  (3.80-5.40)  m/uL


 


Hgb    11.0 L  (11.4-16.0)  gm/dL


 


Hct    32.1 L  (34.0-46.0)  %


 


Neutrophils #     (1.3-7.7)  k/uL


 


PT     (9.0-12.0)  sec


 


INR     (<1.2)  


 


POC Glucose (mg/dL)   108 H   (75-99)  mg/dL


 


Crossmatch  See Detail    














  08/30/19 08/31/19 08/31/19 Range/Units





  21:13 05:30 05:30 


 


WBC    14.3 H  (3.8-10.6)  k/uL


 


RBC    3.68 L  (3.80-5.40)  m/uL


 


Hgb    10.3 L  (11.4-16.0)  gm/dL


 


Hct    31.9 L  (34.0-46.0)  %


 


Neutrophils #    11.7 H  (1.3-7.7)  k/uL


 


PT   15.0 H   (9.0-12.0)  sec


 


INR   1.5 H   (<1.2)  


 


POC Glucose (mg/dL)  163 H    (75-99)  mg/dL


 


Crossmatch     














  08/31/19 08/31/19 Range/Units





  07:05 11:56 


 


WBC    (3.8-10.6)  k/uL


 


RBC    (3.80-5.40)  m/uL


 


Hgb    (11.4-16.0)  gm/dL


 


Hct    (34.0-46.0)  %


 


Neutrophils #    (1.3-7.7)  k/uL


 


PT    (9.0-12.0)  sec


 


INR    (<1.2)  


 


POC Glucose (mg/dL)  174 H  268 H  (75-99)  mg/dL


 


Crossmatch    








                      Microbiology - Last 24 Hours (Table)











 08/28/19 14:26 Blood Culture - Preliminary





 Blood    No Growth after 72 hours


 


 08/30/19 15:10 Gram Stain - Preliminary





 Other - Other Wound Culture - Preliminary














Assessment and Plan


Plan: 





1.  Diabetic foot ulcer, involving right heel, with wet gangrene, known history 

of PAD, failed to heal despite adequate length of time as an outpatient, 

approximately 6 months.  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  Coumadin 

restarted Echocardiogram as above.  





2. Iron deficiency anemia or anemia of chronic disease.  Iron studies pending.  

Transfuse 1 unit of packed RBCs followed by Lasix 20 mg IV push.  





3.  Paroxysmal atrial fibrillation on chronic Coumadin for anticoagulation 

continue metoprolol 100 twice daily





4.  CKD stage IV, creatinine of 2.34, avoid nephrotoxins and hypotension,





5.  Diabetes mellitus type 2 with diabetic neuropathy.  Continue Lantus 20 units

at bedtime, hold patient's scheduled 6 units with meals.  Continue NovoLog scale

before meals and at bedtime.  Hemoglobin A1c.


 


6.  Hypertension.  Continue amlodipine 5 mg daily, losartan 100 mg daily, 

Lopressor 100 mg twice daily, hydralazine 100 mg 3 times daily, Lasix 40 orally 

milligrams daily, Aldactone 12.5 mg daily.  





7.  Hyperlipidemia.  Continue Pravachol 80 mg daily.





8.  Mild protein calorie malnutrition.  Continue Glucerna.





9.  Generalized debility.  PT and OT to follow after surgery.





10.  DVT prophylaxis.  On Coumadin





11.  GI prophylaxis.  Pepcid





CODE STATUS: Full code

## 2019-09-01 LAB
ERYTHROCYTE [DISTWIDTH] IN BLOOD BY AUTOMATED COUNT: 3.36 M/UL (ref 3.8–5.4)
ERYTHROCYTE [DISTWIDTH] IN BLOOD: 15.9 % (ref 11.5–15.5)
GLUCOSE BLD-MCNC: 135 MG/DL (ref 75–99)
GLUCOSE BLD-MCNC: 178 MG/DL (ref 75–99)
GLUCOSE BLD-MCNC: 197 MG/DL (ref 75–99)
GLUCOSE BLD-MCNC: 208 MG/DL (ref 75–99)
GLUCOSE BLD-MCNC: 238 MG/DL (ref 75–99)
HCT VFR BLD AUTO: 29 % (ref 34–46)
HGB BLD-MCNC: 9.4 GM/DL (ref 11.4–16)
INR PPP: 1.7 (ref ?–1.2)
MCH RBC QN AUTO: 27.9 PG (ref 25–35)
MCHC RBC AUTO-ENTMCNC: 32.2 G/DL (ref 31–37)
MCV RBC AUTO: 86.5 FL (ref 80–100)
PLATELET # BLD AUTO: 346 K/UL (ref 150–450)
PT BLD: 17 SEC (ref 9–12)
WBC # BLD AUTO: 14.4 K/UL (ref 3.8–10.6)

## 2019-09-01 RX ADMIN — INSULIN ASPART SCH UNIT: 100 INJECTION, SOLUTION INTRAVENOUS; SUBCUTANEOUS at 12:34

## 2019-09-01 RX ADMIN — INSULIN ASPART SCH UNIT: 100 INJECTION, SOLUTION INTRAVENOUS; SUBCUTANEOUS at 17:23

## 2019-09-01 RX ADMIN — Medication SCH UNIT: at 08:44

## 2019-09-01 RX ADMIN — HYDROCODONE BITARTRATE AND ACETAMINOPHEN PRN EACH: 5; 325 TABLET ORAL at 11:16

## 2019-09-01 RX ADMIN — SPIRONOLACTONE SCH MG: 25 TABLET, FILM COATED ORAL at 08:44

## 2019-09-01 RX ADMIN — LINAGLIPTIN SCH MG: 5 TABLET, FILM COATED ORAL at 17:22

## 2019-09-01 RX ADMIN — HYDROCODONE BITARTRATE AND ACETAMINOPHEN PRN EACH: 5; 325 TABLET ORAL at 19:51

## 2019-09-01 RX ADMIN — DOCUSATE SODIUM AND SENNOSIDES SCH EACH: 50; 8.6 TABLET ORAL at 08:44

## 2019-09-01 RX ADMIN — LORATADINE SCH MG: 10 TABLET ORAL at 08:44

## 2019-09-01 RX ADMIN — POTASSIUM CHLORIDE SCH MEQ: 750 TABLET, EXTENDED RELEASE ORAL at 08:44

## 2019-09-01 RX ADMIN — METOPROLOL TARTRATE SCH MG: 50 TABLET, FILM COATED ORAL at 08:44

## 2019-09-01 RX ADMIN — LEVOTHYROXINE SODIUM SCH MCG: 50 TABLET ORAL at 05:24

## 2019-09-01 RX ADMIN — FLUTICASONE PROPIONATE SCH: 50 SPRAY, METERED NASAL at 15:31

## 2019-09-01 RX ADMIN — SODIUM CHLORIDE SCH: 50 SOLUTION/ DROPS OPHTHALMIC at 20:56

## 2019-09-01 RX ADMIN — HYDROCODONE BITARTRATE AND ACETAMINOPHEN PRN EACH: 5; 325 TABLET ORAL at 05:23

## 2019-09-01 RX ADMIN — INSULIN ASPART SCH UNIT: 100 INJECTION, SOLUTION INTRAVENOUS; SUBCUTANEOUS at 07:45

## 2019-09-01 RX ADMIN — LOSARTAN POTASSIUM SCH MG: 50 TABLET, FILM COATED ORAL at 08:44

## 2019-09-01 RX ADMIN — HYDROCODONE BITARTRATE AND ACETAMINOPHEN PRN EACH: 5; 325 TABLET ORAL at 15:28

## 2019-09-01 RX ADMIN — FUROSEMIDE SCH MG: 40 TABLET ORAL at 08:44

## 2019-09-01 RX ADMIN — INSULIN DETEMIR SCH UNIT: 100 INJECTION, SOLUTION SUBCUTANEOUS at 18:49

## 2019-09-01 RX ADMIN — PRAVASTATIN SODIUM SCH MG: 80 TABLET ORAL at 17:22

## 2019-09-01 RX ADMIN — METOPROLOL TARTRATE SCH MG: 50 TABLET, FILM COATED ORAL at 20:55

## 2019-09-01 RX ADMIN — SODIUM CHLORIDE SCH: 50 SOLUTION/ DROPS OPHTHALMIC at 15:31

## 2019-09-01 RX ADMIN — INSULIN ASPART SCH UNIT: 100 INJECTION, SOLUTION INTRAVENOUS; SUBCUTANEOUS at 17:22

## 2019-09-01 RX ADMIN — INSULIN ASPART SCH UNIT: 100 INJECTION, SOLUTION INTRAVENOUS; SUBCUTANEOUS at 20:54

## 2019-09-01 RX ADMIN — ASPIRIN 81 MG CHEWABLE TABLET SCH MG: 81 TABLET CHEWABLE at 08:44

## 2019-09-01 NOTE — P.PN
Subjective


Progress Note Date: 09/01/19





Patient seen and examined.  No complaints.  Pain is essentially controlled 

although right leg is somewhat sore.  No chest pains or shortness of breath





Objective





- Vital Signs


Vital signs: 


                                   Vital Signs











Temp  98.5 F   09/01/19 12:00


 


Pulse  100   09/01/19 12:00


 


Resp  18   09/01/19 12:00


 


BP  145/63   09/01/19 12:00


 


Pulse Ox  99   09/01/19 12:00








                                 Intake & Output











 08/31/19 09/01/19 09/01/19





 18:59 06:59 18:59


 


Intake Total 808 300 


 


Output Total 500  1000


 


Balance 308 300 -1000


 


Weight  84 kg 


 


Intake:   


 


  Intake, IV Titration 210  





  Amount   


 


    Lactated Ringers 1,000 ml 160  





    @ 0 mls/hr IV .STK-MED   





    ONE Rx#:[id number]   


 


    ceFAZolin 2 gm In Sodium 50  





    Chloride 0.9% 50 ml @ 100   





    mls/hr IVPB Q8HR [facility] Rx#   





    [id number]   


 


  Oral 598 300 


 


Output:   


 


  Urine 500  1000


 


Other:   


 


  Voiding Method Indwelling Catheter Indwelling Catheter Indwelling Catheter














- Exam





No acute distress resting comfortably


Heart irregularly irregular


Lungs clear


Abdomen soft, obese, nontender


Right residual limb clean, dry, intact.  Dressing change.





- Labs


CBC & Chem 7: 


                                 09/01/19 06:09





                                 08/30/19 05:57


Labs: 


                  Abnormal Lab Results - Last 24 Hours (Table)











  08/31/19 08/31/19 08/31/19 Range/Units





  17:19 17:20 21:04 


 


WBC     (3.8-10.6)  k/uL


 


RBC     (3.80-5.40)  m/uL


 


Hgb     (11.4-16.0)  gm/dL


 


Hct     (34.0-46.0)  %


 


RDW     (11.5-15.5)  %


 


PT     (9.0-12.0)  sec


 


INR     (<1.2)  


 


POC Glucose (mg/dL)  312 H  306 H  333 H  (75-99)  mg/dL














  09/01/19 09/01/19 09/01/19 Range/Units





  06:09 06:09 06:46 


 


WBC   14.4 H   (3.8-10.6)  k/uL


 


RBC   3.36 L   (3.80-5.40)  m/uL


 


Hgb   9.4 L   (11.4-16.0)  gm/dL


 


Hct   29.0 L   (34.0-46.0)  %


 


RDW   15.9 H   (11.5-15.5)  %


 


PT  17.0 H    (9.0-12.0)  sec


 


INR  1.7 H    (<1.2)  


 


POC Glucose (mg/dL)    135 H  (75-99)  mg/dL














  09/01/19 Range/Units





  11:57 


 


WBC   (3.8-10.6)  k/uL


 


RBC   (3.80-5.40)  m/uL


 


Hgb   (11.4-16.0)  gm/dL


 


Hct   (34.0-46.0)  %


 


RDW   (11.5-15.5)  %


 


PT   (9.0-12.0)  sec


 


INR   (<1.2)  


 


POC Glucose (mg/dL)  178 H  (75-99)  mg/dL








                      Microbiology - Last 24 Hours (Table)











 08/30/19 15:10 Gram Stain - Preliminary





 Other - Other Wound Culture - Preliminary





    Gram Neg Bacilli


 


 08/28/19 14:26 Blood Culture - Preliminary





 Blood    No Growth after 72 hours














Assessment and Plan


Assessment: 





#1 wet gangrene right heel status post right above-knee amputation


#2 peripheral arterial disease


#3 atrial fibrillation on anticoagulation


#4 hypertension


#5 diabetes


#6 neuropathy


#7 hypothyroidism


#8 hyperlipidemia


#9 chronic debility, nonambulatory


#10 anemia


Plan: 





Continue diet as tolerated.  Increase activity as tolerated, physical therapy 

eval and treat.  Prosthetics [employer], comfort prosthetics, to evaluate patient. 

Doing well overall.  Coumadin restarted, pharmacy dosing

## 2019-09-01 NOTE — P.PN
Subjective


Progress Note Date: 09/01/19





This is an 84-year-old pleasant female followed by Dr. Pardo known history of 

diabetes mellitus type 2 requiring insulin, with PAD and other complications 

peripheral neuropathy hypertension, hypothyroidism atrial fibrillation, PAD, 

chronic gangrene/chronic ulcer diabetic foot ulcers right heel at least since 

March 2019, admitted to [facility] as the ulcers have failed to

heal, and they have discussed the necessity of amputation, and requested to be 

admitted for the vascular surgeon with Dr. Perez, he she was also seen by Dr. Ross in the past.  Cardiology was requested to see the patient consultation 

for preop, patient denies any history of CHF in the past, no history off CVA, no

documented history of myocardial infarction, we are going to request an EKG him 

up preop.  ProBNP, echocardiogram, with anticipation that the surgery would be 

done in the next 1-2 days.  Coumadin will be held,





8/29: Dr. Perez is planning for above-the-knee amputation tomorrow.  Coumadin 

remains on hold.  Cardiology consult requested. Echocardiogram reveals EF 55-

60%, mild concentric left ventricular hypertrophy, mild aortic regurgitation, 

moderate mitral regurgitation, moderate tricuspid regurgitation, mild pulmonary 

hypertension. Chest x-ray reveals chronic changes and cardiomegaly with small 2 

tiny bilateral pleural effusions diminished in size from prior.  Patient has 

been afebrile, heart rate 77, blood pressure 115/63 and pulse ox 99% on room 

air.  Repeat lab work reveals normal white count of 9.7, hemoglobin 6.5 and 1 

unit of packed RBCs will be ordered for transfusion.  BUN 56 and creatinine 

2.33.  Electrolytes within normal limits.  Blood sugar running between 121 and 

167.  Hemoglobin A1c is pending.Patient denies any new complaints.  Perez 

catheter is in place.  Sister is at the bedside.





8/30: Human globin this morning is 8.1 and Dr. Perez has ordered another unit of

packed RBCs.  She is scheduled for amputation this afternoon.  She has been seen

by cardiology this morning cleared for surgery.  Repeat lab work reveals white 

count of 12.3, INR 1.6, BUN 55 and creatinine 2.47, blood sugars running .

 Patient's sisters also at the bedside.  All questions have been answered.  

Patient denies any new complaints.





8/31 patient examined bedside complains of significant in the right lower stump.

 Vital examination the temperature is 98.7 blood pressure 120/58 leukocytosis 

improved from 91.4-14 hemoglobin has slightly troponin dropped from 11-10.3 

platelets and normal.  Patient is restarted on Coumadin post surgery.  Lantus 

increased to 25 units daily continue Tradjenta and insulin with insulin sliding 

scale.





9/1: Patient denies any new complaints.  No chest pain or shortness of breath.  

She denies any diarrhea or constipation.  INR today is at 1.7.  Pharmacy is 

dosing Coumadin.  Patient has been afebrile, heart rate 80, blood pressure 

140/65, pulse ox 99% on room air.  We will [facility] the patient to [facility]

without telemetry.  Cardiac monitor has been atrial fibrillation with rate 

controlled.  Plan for discharge on Tuesday back to nursing home.











Objective





- Vital Signs


Vital signs: 


                                   Vital Signs











Temp  98.6 F   09/01/19 04:00


 


Pulse  80   09/01/19 04:00


 


Resp  18   09/01/19 04:00


 


BP  110/60   09/01/19 04:00


 


Pulse Ox  99   09/01/19 04:00








                                 Intake & Output











 08/31/19 09/01/19 09/01/19





 18:59 06:59 18:59


 


Intake Total 808 300 


 


Output Total 500  


 


Balance 308 300 


 


Weight  84 kg 


 


Intake:   


 


  Intake, IV Titration 210  





  Amount   


 


    Lactated Ringers 1,000 ml 160  





    @ 0 mls/hr IV .STK-MED   





    ONE Rx#:[id number]   


 


    ceFAZolin 2 gm In Sodium 50  





    Chloride 0.9% 50 ml @ 100   





    mls/hr IVPB Q8HR Count includes the [facility] Rx#   





    :[id number]   


 


  Oral 598 300 


 


Output:   


 


  Urine 500  


 


Other:   


 


  Voiding Method Indwelling Catheter Indwelling Catheter 














- Exam





 Review of Systems 


Constitutional: No fever, no chills, no night sweats.  No weight change.  

Reports weakness, fatigue or lethargy.  No daytime sleepiness.


EENT: No headache.  No blurred vision or double vision, no loss of vision.  No 

loss of Hearing, no ringing in the ears, no dizziness.  No nasal drainage or 

congestion.  No epistaxis.  No sore throat.


Lungs: No shortness of breath, cough, no sputum production.  No wheezing.


Cardiovascular: No chest pain, no lower extremity edema.  No palpitations.  No 

paroxysmal nocturnal dyspnea.  No orthopnea.  No lightheadedness or dizziness.  

No syncopal episodes.


Abdominal: No abdominal pain.  No nausea, vomiting.  No diarrhea.  No 

constipation.  No bloody or tarry stools..  No loss of appetite.


Genitourinary: No dysuria, increased frequency, urgency.  No urinary retention.


Musculoskeletal: Pain in the right lower extremity continues


Integumentary: Reports wounds, no lesions.  No rash or pruritus.  No unusual 

bruising.  No change in hair or nails.


Neurologic: No aphasia. No facial droop. No change in mentation. No head injury.

No headache. No paralysis. No paresthesia.


Psychiatric: No depression.  No anxiety.  No mood swings.


Endocrine: No abnormal blood sugars.  No weight change.  No excessive sweating 

or thirst.  No cold intolerance.  








- Exam





- Constitutional


General appearance: cooperative, no acute distress, resting in bed, obese





- EENT


Eyes: anicteric sclerae, EOMI, dentition normal, normal appearance


ENT: hearing grossly normal, NA/AT, normal oropharynx





- Neck


Neck: normal ROM





- Respiratory


Respiratory: bilateral: CTA, negative: diminished, dullness, rales





- Cardiovascular


Rhythm: regular


Heart sounds: normal: S1, S2


Abnormal Heart Sounds: no systolic murmur, no diastolic murmur, no rub, no S3 

Gallop, no S4 Gallop, no click, no other





- Gastrointestinal


General gastrointestinal: normal bowel sounds, soft


Perez catheter in place





- Integumentary


Integumentary: Normal





- Neurologic


Neurologic: CNII-XII intact





- Musculoskeletal


Musculoskeletal: Right lower extremity dressing no oozing or drainage seen on 

the dressing.  





- Psychiatric


Psychiatric: A&O x's 3, appropriate affect, intact judgment & insight





- Labs


CBC & Chem 7: 


                                 09/01/19 06:09





                                 08/30/19 05:57


Labs: 


                  Abnormal Lab Results - Last 24 Hours (Table)











  08/31/19 08/31/19 08/31/19 Range/Units





  11:56 17:19 17:20 


 


WBC     (3.8-10.6)  k/uL


 


RBC     (3.80-5.40)  m/uL


 


Hgb     (11.4-16.0)  gm/dL


 


Hct     (34.0-46.0)  %


 


RDW     (11.5-15.5)  %


 


PT     (9.0-12.0)  sec


 


INR     (<1.2)  


 


POC Glucose (mg/dL)  268 H  312 H  306 H  (75-99)  mg/dL














  08/31/19 09/01/19 09/01/19 Range/Units





  21:04 06:09 06:09 


 


WBC    14.4 H  (3.8-10.6)  k/uL


 


RBC    3.36 L  (3.80-5.40)  m/uL


 


Hgb    9.4 L  (11.4-16.0)  gm/dL


 


Hct    29.0 L  (34.0-46.0)  %


 


RDW    15.9 H  (11.5-15.5)  %


 


PT   17.0 H   (9.0-12.0)  sec


 


INR   1.7 H   (<1.2)  


 


POC Glucose (mg/dL)  333 H    (75-99)  mg/dL














  09/01/19 Range/Units





  06:46 


 


WBC   (3.8-10.6)  k/uL


 


RBC   (3.80-5.40)  m/uL


 


Hgb   (11.4-16.0)  gm/dL


 


Hct   (34.0-46.0)  %


 


RDW   (11.5-15.5)  %


 


PT   (9.0-12.0)  sec


 


INR   (<1.2)  


 


POC Glucose (mg/dL)  135 H  (75-99)  mg/dL








                      Microbiology - Last 24 Hours (Table)











 08/30/19 15:10 Gram Stain - Preliminary





 Other - Other Wound Culture - Preliminary





    Gram Neg Bacilli


 


 08/28/19 14:26 Blood Culture - Preliminary





 Blood    No Growth after 72 hours














Assessment and Plan


Plan: 





1.  Diabetic foot ulcer, involving right heel, with wet gangrene, known history 

of PAD, failed to heal despite adequate length of time as an outpatient, ap

proximately 6 months.  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  Coumadin 

restarted Echocardiogram as above.  





2. Iron deficiency anemia or anemia of chronic disease.  Iron studies pending.  

Transfuse 1 unit of packed RBCs followed by Lasix 20 mg IV push.  





3.  Paroxysmal atrial fibrillation on chronic Coumadin for anticoagulation-

pharmacy dosing, continue metoprolol 100 twice daily





4.  CKD stage IV, creatinine of 2.34, avoid nephrotoxins and hypotension,





5.  Diabetes mellitus type 2 with diabetic neuropathy.  Continue Lantus 20 units

at bedtime, hold patient's scheduled 6 units with meals.  Continue NovoLog scale

before meals and at bedtime.  Hemoglobin A1c.


 


6.  Hypertension.  Continue amlodipine 5 mg daily, losartan 100 mg daily, 

Lopressor 100 mg twice daily, hydralazine 100 mg 3 times daily, Lasix 40 orally 

milligrams daily, Aldactone 12.5 mg daily.  





7.  Hyperlipidemia.  Continue Pravachol 80 mg daily.





8.  Mild protein calorie malnutrition.  Continue Glucerna.





9.  Generalized debility.  PT and OT to follow after surgery.





10.  DVT prophylaxis.  On Coumadin





11.  GI prophylaxis.  Pepcid





CODE STATUS: Full code








Discharge plan: Return to [facility] on Tuesday under the care of 

Dr. Pardo.





Impression and plan of care have been directed as dictated by the signing 

physician.  Candy Kumar nurse practitioner acting as scribe for signing 

physician.

## 2019-09-01 NOTE — PN
PROGRESS NOTE



This is an 84-year-old lady who underwent right above-knee amputation for wet gangrene.

This morning she complains of discomfort at the surgical site but is otherwise doing

well.  There is no history of chest pain, difficulty in breathing or palpitations.



EXAM:

Comfortable at rest.  Vital signs are stable.  Chest exam reveals good air entry

bilaterally.  Heart exam reveals first and second heart sounds.  No gallop.  Abdomen is

soft.  Exam of extremities reveals right above-knee amputation.



LAB:

Showed that the hemoglobin is 9.4, platelet count is 346.



The patient is on aspirin, Norvasc, Lasix, Apresoline, Synthroid Cozaar, Lopressor, and

Coumadin.  Pharmacy is dosing Coumadin.  INR today is 1.7.



ASSESSMENT:

1. Peripheral vascular disease status post right above-knee amputation.

2. Persistent atrial fibrillation.

3. Hypertension.

4. Diabetes.

5. Dyslipidemia.



PLAN:

The patient will continue with Coumadin and rest of her medications.





MMODL / IJN: [id number] / Job#: [id number]

## 2019-09-02 LAB
ERYTHROCYTE [DISTWIDTH] IN BLOOD BY AUTOMATED COUNT: 3.42 M/UL (ref 3.8–5.4)
ERYTHROCYTE [DISTWIDTH] IN BLOOD: 15.9 % (ref 11.5–15.5)
GLUCOSE BLD-MCNC: 117 MG/DL (ref 75–99)
GLUCOSE BLD-MCNC: 152 MG/DL (ref 75–99)
GLUCOSE BLD-MCNC: 164 MG/DL (ref 75–99)
GLUCOSE BLD-MCNC: 184 MG/DL (ref 75–99)
HCT VFR BLD AUTO: 29.9 % (ref 34–46)
HGB BLD-MCNC: 9.6 GM/DL (ref 11.4–16)
INR PPP: 1.7 (ref ?–1.2)
MCH RBC QN AUTO: 28.1 PG (ref 25–35)
MCHC RBC AUTO-ENTMCNC: 32.1 G/DL (ref 31–37)
MCV RBC AUTO: 87.3 FL (ref 80–100)
PLATELET # BLD AUTO: 378 K/UL (ref 150–450)
PT BLD: 17.1 SEC (ref 9–12)
WBC # BLD AUTO: 12.9 K/UL (ref 3.8–10.6)

## 2019-09-02 RX ADMIN — HYDROCODONE BITARTRATE AND ACETAMINOPHEN PRN EACH: 5; 325 TABLET ORAL at 16:23

## 2019-09-02 RX ADMIN — HYDROCODONE BITARTRATE AND ACETAMINOPHEN PRN EACH: 5; 325 TABLET ORAL at 12:21

## 2019-09-02 RX ADMIN — INSULIN DETEMIR SCH UNIT: 100 INJECTION, SOLUTION SUBCUTANEOUS at 17:57

## 2019-09-02 RX ADMIN — INSULIN ASPART SCH UNIT: 100 INJECTION, SOLUTION INTRAVENOUS; SUBCUTANEOUS at 12:22

## 2019-09-02 RX ADMIN — LEVOTHYROXINE SODIUM SCH MCG: 50 TABLET ORAL at 05:58

## 2019-09-02 RX ADMIN — METOPROLOL TARTRATE SCH MG: 50 TABLET, FILM COATED ORAL at 20:39

## 2019-09-02 RX ADMIN — INSULIN ASPART SCH UNIT: 100 INJECTION, SOLUTION INTRAVENOUS; SUBCUTANEOUS at 12:20

## 2019-09-02 RX ADMIN — INSULIN ASPART SCH UNIT: 100 INJECTION, SOLUTION INTRAVENOUS; SUBCUTANEOUS at 20:38

## 2019-09-02 RX ADMIN — INSULIN ASPART SCH: 100 INJECTION, SOLUTION INTRAVENOUS; SUBCUTANEOUS at 06:59

## 2019-09-02 RX ADMIN — METOPROLOL TARTRATE SCH MG: 50 TABLET, FILM COATED ORAL at 08:07

## 2019-09-02 RX ADMIN — INSULIN ASPART SCH UNIT: 100 INJECTION, SOLUTION INTRAVENOUS; SUBCUTANEOUS at 17:57

## 2019-09-02 RX ADMIN — HYDROCODONE BITARTRATE AND ACETAMINOPHEN PRN EACH: 5; 325 TABLET ORAL at 20:39

## 2019-09-02 RX ADMIN — PRAVASTATIN SODIUM SCH MG: 80 TABLET ORAL at 17:58

## 2019-09-02 RX ADMIN — SODIUM CHLORIDE SCH: 50 SOLUTION/ DROPS OPHTHALMIC at 15:02

## 2019-09-02 RX ADMIN — SPIRONOLACTONE SCH MG: 25 TABLET, FILM COATED ORAL at 08:06

## 2019-09-02 RX ADMIN — Medication SCH UNIT: at 08:06

## 2019-09-02 RX ADMIN — LORATADINE SCH MG: 10 TABLET ORAL at 08:06

## 2019-09-02 RX ADMIN — FLUTICASONE PROPIONATE SCH: 50 SPRAY, METERED NASAL at 12:41

## 2019-09-02 RX ADMIN — SODIUM CHLORIDE SCH: 50 SOLUTION/ DROPS OPHTHALMIC at 20:41

## 2019-09-02 RX ADMIN — INSULIN ASPART SCH UNIT: 100 INJECTION, SOLUTION INTRAVENOUS; SUBCUTANEOUS at 07:01

## 2019-09-02 RX ADMIN — LOSARTAN POTASSIUM SCH MG: 50 TABLET, FILM COATED ORAL at 08:06

## 2019-09-02 RX ADMIN — DOCUSATE SODIUM AND SENNOSIDES SCH EACH: 50; 8.6 TABLET ORAL at 08:07

## 2019-09-02 RX ADMIN — FUROSEMIDE SCH MG: 40 TABLET ORAL at 08:06

## 2019-09-02 RX ADMIN — HYDROCODONE BITARTRATE AND ACETAMINOPHEN PRN EACH: 5; 325 TABLET ORAL at 08:07

## 2019-09-02 RX ADMIN — ASPIRIN 81 MG CHEWABLE TABLET SCH MG: 81 TABLET CHEWABLE at 08:06

## 2019-09-02 RX ADMIN — LINAGLIPTIN SCH MG: 5 TABLET, FILM COATED ORAL at 17:58

## 2019-09-02 RX ADMIN — POTASSIUM CHLORIDE SCH MEQ: 750 TABLET, EXTENDED RELEASE ORAL at 08:07

## 2019-09-02 NOTE — P.CONS
History of Present Illness





- Reason for Consult


Consult date: 19


Infected femoral tibial bypass graft


Requesting physician: Christy Perez





- Chief Complaint


Right heel wound x few weeks





- History of Present Illness


Patient is 84-year-old  female with a past medical history significant 

peripheral vascular disease in this patient who did have multiple grafts on her 

right leg patient is a chronic nonhealing wound to the right heel area for which

the patient was admitted to the hospital for a chronic nonhealing wound patient 

on admission was afebrile and white count was normal the culture obtained was 

negative patient was taken to the OR and status post right above-the-knee 

amputation at the time of surgery she was noticed to have a pus pocket and the 

previous Fem-Tib bypass graft which was removed in entirety, pulse is an 

recommend back positive for pseudomonas aeruginosa patient was started on 

cefepime and infectious disease was consulted for further recommendation 

regarding antibiotic therapy


Patient is currently afebrile not activity good historian the patient denies any

pain to the right AKA site or any pain to the groin area no chest pains or 

shortness of breath minimal cough no nausea no vomiting no abdominal pain and no

diarrhea





Review of Systems





Positive points has been mentioned in HPI rest of the systems are negative





Past Medical History


Past Medical History: Atrial Fibrillation, Diabetes Mellitus, Hyperlipidemia, 

Hypertension, Thyroid Disorder


Additional Past Medical History / Comment(s): peripheral neuropathy, UTIs, 

urinary incontinence, OA bilateral hands and back, PVD, cervical cancer


History of Any Multi-Drug Resistant Organisms: MRSA


Year Discovered:: 2016


MDRO Source:: RIGHT FOOT


Past Surgical History: Hysterectomy, Tonsillectomy


Additional Past Surgical History / Comment(s): R leg bypass surgery, bilateral 

cataract removal


Past Anesthesia/Blood Transfusion Reactions: No Reported Reaction


Past Psychological History: No Psychological Hx Reported


Additional Psychological History / Comment(s): Pt states she lives at [facility].  States uses a wheelchair to get around.


Smoking Status: Never smoker


Past Alcohol Use History: None Reported


Additional Past Alcohol Use History / Comment(s): Patient resides at [facility] and is wheelchair bound.


Past Drug Use History: None Reported





- Past Family History


  ** Mother


Family Medical History: CVA/TIA


Additional Family Medical History / Comment(s): Mother  in her 70's.





  ** Father


Family Medical History: Hypertension


Additional Family Medical History / Comment(s): Father  in his 70's.





Medications and Allergies


                                Home Medications











 Medication  Instructions  Recorded  Confirmed  Type


 


Aspirin 81 mg PO DAILY 16 History


 


Levothyroxine Sodium [Synthroid] 50 mcg PO QAM 16 History


 


Pravastatin Sodium [Pravachol] 80 mg PO DAILY@1800 16 History


 


Sennosides-Docusate Sodium 1 tab PO DAILY 17 History





[Senokot-S]    


 


Multivitamins, Thera [Multivitamin 1 tab PO HS 10/01/18 08/28/19 History





(formulary)]    


 


Potassium Chloride ER [K-Dur 10] 10 meq PO DAILY 10/01/18 08/28/19 History


 


Sodium Chloride 5% Ophth Soln 1 drop BOTH EYES BID 10/01/18 08/28/19 History





[Vahid 128]    


 


Furosemide [Lasix] 40 mg PO DAILY #0 10/05/18 08/28/19 Rx


 


Amino Acids/Protein Hydrolys 30 ml PO DAILY 18 History





[Pro-Stat Supplement]    


 


amLODIPine BESYLATE 5 mg PO DAILY 18 History


 


Spironolactone [Aldactone] 12.5 mg PO DAILY 18 History


 


Cranberry 450mg 450 mg PO DAILY 19 History


 


Losartan Potassium 100 mg PO DAILY 19 History


 


Metoprolol Tartrate [Lopressor] 100 mg PO BID 19 History


 


Warfarin [Coumadin] 2.5 mg PO HS 19 History


 


hydrALAZINE HCL [Apresoline] 100 mg PO TID 19 History


 


sitaGLIPtin PHOSPHATE [Januvia] 50 mg PO DAILY@1800 19 History


 


INSULIN LISPRO (humaLOG) [humaLOG] 6 units SQ AC-TID #0 03/15/19 08/28/19 Rx


 


Amoxic-Pot Clav 875-125Mg 1 tab PO Q12HR 19 History





[Augmentin 875-125]    


 


Bisacodyl 5 mg PO DAILY PRN 19 History


 


Cholecalciferol (Vitamin D3) 2,000 unit PO DAILY 19 History





[Vitamin D3]    


 


Ferrous Sulfate [Feosol] 325 mg PO DAILY 19 History


 


Fluticasone Nasal Spray [Flonase 1 spr EA NOSTRIL DAILY 19 

History





Nasal Spray]    


 


Insulin Detemir (Levemir) [Levemir] 20 unit SQ DAILY@1800 19 

History


 


Loratadine [Claritin] 10 mg PO DAILY 19 History








                                    Allergies











Allergy/AdvReac Type Severity Reaction Status Date / Time


 


No Known Allergies Allergy   Verified 19 12:39














Physical Exam


Vitals: 


                                   Vital Signs











  Temp Pulse Pulse Resp BP Pulse Ox


 


 19 12:00  98.3 F   94  18  109/63  97


 


 19 08:00  98.4 F  90   16  132/76  100


 


 19 04:00  98.4 F  75  80  18  118/57  97


 


 19 23:53   73  80  16  


 


 19 23:49  98.3 F  73   16  131/64  99


 


 19 20:00  97.9 F  80  80  18  127/60  98


 


 19 16:00  98.5 F   83  18  117/56  99








                                Intake and Output











 19





 22:59 06:59 14:59


 


Intake Total 240 120 240


 


Output Total  1000 950


 


Balance 240 880 -710


 


Intake:   


 


  Intake, IV Titration  0 





  Amount   


 


    Lactated Ringers 1,000 ml  0 





    @ 0 mls/hr IV .Webtab-MED   





    ONE Rx#:[id number]   


 


  Oral 240 120 240


 


Output:   


 


  Urine  1000 950


 


Other:   


 


  Voiding Method Indwelling Catheter Indwelling Catheter Indwelling Catheter


 


  Weight   84 kg














GENERAL DESCRIPTION: An elderly female lying in bed, no distress. No tachypnea 

or accessory muscle of respiration use.


HEENT: Shows Pallor , no scleral icterus. Oral mucous membrane is dry. No 

pharyngeal erythema or thrush


NECK: Trachea central, no thyromegaly.


LUNGS: Unlabored breathing. Clear to auscultation anteriorly. No wheeze or 

crackle.


HEART: S1, S2, regular rate and rhythm. No loud murmur


ABDOMEN: Soft, no tenderness , guarding or rigidity, no organomegaly


EXTREMITIES: Right groin with no swelling no redness right AKA stump incision is

 intact with no swelling no redness or any drainage


SKIN: No rash, no masses palpable.


NEUROLOGICAL: The patient is awake, alert, oriented x2, mood and affect normal.














Results


CBC & Chem 7: 


                                 19 06:29





                                 19 05:57


Labs: 


                  Abnormal Lab Results - Last 24 Hours (Table)











  19 Range/Units





  16:56 18:48 20:27 


 


WBC     (3.8-10.6)  k/uL


 


RBC     (3.80-5.40)  m/uL


 


Hgb     (11.4-16.0)  gm/dL


 


Hct     (34.0-46.0)  %


 


RDW     (11.5-15.5)  %


 


PT     (9.0-12.0)  sec


 


INR     (<1.2)  


 


POC Glucose (mg/dL)  197 H  208 H  238 H  (75-99)  mg/dL














  19 Range/Units





  06:29 06:29 06:31 


 


WBC  12.9 H    (3.8-10.6)  k/uL


 


RBC  3.42 L    (3.80-5.40)  m/uL


 


Hgb  9.6 L    (11.4-16.0)  gm/dL


 


Hct  29.9 L    (34.0-46.0)  %


 


RDW  15.9 H    (11.5-15.5)  %


 


PT   17.1 H   (9.0-12.0)  sec


 


INR   1.7 H   (<1.2)  


 


POC Glucose (mg/dL)    117 H  (75-99)  mg/dL














  19 Range/Units





  12:11 


 


WBC   (3.8-10.6)  k/uL


 


RBC   (3.80-5.40)  m/uL


 


Hgb   (11.4-16.0)  gm/dL


 


Hct   (34.0-46.0)  %


 


RDW   (11.5-15.5)  %


 


PT   (9.0-12.0)  sec


 


INR   (<1.2)  


 


POC Glucose (mg/dL)  184 H  (75-99)  mg/dL








                      Microbiology - Last 24 Hours (Table)











 19 15:10 Gram Stain - Final





 Other - Other Wound Culture - Final





    Pseudomonas aeruginosa


 


 19 14:26 Blood Culture - Preliminary





 Blood    No Growth after 96 hours














Assessment and Plan


Assessment: 





1-patient with positive culture with pseudomonas aeruginosa from a previous 

femoral to tibial bypass graft which has been removed in entirety during the 

surgical procedure along with AK in this patient who has not been bacteremic 

afebrile white count is normal and AK incision looks clean with no evidence 

cellulitis


(1) Vascular graft infection


Current Visit: Yes   Status: Acute   Code(s): T82.7XXA - INFECT/INFLM REACT D/T 

OTH CARDI/VASC DEV/IMPLNT/GRFT, INIT   SNOMED Code(s): 682866506


   





(2) Pseudomonas aeruginosa infection


Current Visit: Yes   Status: Acute   Code(s): A49.8 - OTHER BACTERIAL INFECTIONS

 OF UNSPECIFIED SITE   SNOMED Code(s): 13380166


   


Plan: 





1-cefepime 2 g 1 and then 1 daily dose has been adjusted to kidney function, 

she'll be transitioned to oral Cipro on discharge for 2 weeks, as infected graft

 was removed and patient was not bacteremic she would not need to be on a long-

term IV antibiotic therapy


we will follow on clinical condition and culture to further adjust medication if

 needed


Thank you for this consultation will follow this patient along with you





Time with Patient: Greater than 30

## 2019-09-02 NOTE — P.PN
Subjective


Progress Note Date: 09/02/19





Patient seen and examined.  Pain is essentially controlled although right leg is

somewhat sore.  No chest pains or shortness of breath





Objective





- Vital Signs


Vital signs: 


                                   Vital Signs











Temp  98.4 F   09/02/19 08:00


 


Pulse  90   09/02/19 08:00


 


Resp  16   09/02/19 08:00


 


BP  132/76   09/02/19 08:00


 


Pulse Ox  100   09/02/19 08:00








                                 Intake & Output











 09/01/19 09/02/19 09/02/19





 18:59 06:59 18:59


 


Intake Total 480 120 240


 


Output Total 1400 1000 950


 


Balance -177 -404 -404


 


Intake:   


 


  Intake, IV Titration  0 





  Amount   


 


    Lactated Ringers 1,000 ml  0 





    @ 0 mls/hr IV .STK-MED   





    ONE Rx#:[id number]   


 


  Oral 480 120 240


 


Output:   


 


  Urine 1400 1000 950


 


Other:   


 


  Voiding Method Indwelling Catheter Indwelling Catheter Indwelling Catheter














- Exam





No acute distress resting comfortably


Heart irregularly irregular


Lungs clear


Abdomen soft, obese, nontender


Right residual limb clean, dry, intact. 





- Labs


CBC & Chem 7: 


                                 09/02/19 06:29





                                 08/30/19 05:57


Labs: 


                  Abnormal Lab Results - Last 24 Hours (Table)











  09/01/19 09/01/19 09/01/19 Range/Units





  11:57 16:56 18:48 


 


WBC     (3.8-10.6)  k/uL


 


RBC     (3.80-5.40)  m/uL


 


Hgb     (11.4-16.0)  gm/dL


 


Hct     (34.0-46.0)  %


 


RDW     (11.5-15.5)  %


 


PT     (9.0-12.0)  sec


 


INR     (<1.2)  


 


POC Glucose (mg/dL)  178 H  197 H  208 H  (75-99)  mg/dL














  09/01/19 09/02/19 09/02/19 Range/Units





  20:27 06:29 06:29 


 


WBC   12.9 H   (3.8-10.6)  k/uL


 


RBC   3.42 L   (3.80-5.40)  m/uL


 


Hgb   9.6 L   (11.4-16.0)  gm/dL


 


Hct   29.9 L   (34.0-46.0)  %


 


RDW   15.9 H   (11.5-15.5)  %


 


PT    17.1 H  (9.0-12.0)  sec


 


INR    1.7 H  (<1.2)  


 


POC Glucose (mg/dL)  238 H    (75-99)  mg/dL














  09/02/19 Range/Units





  06:31 


 


WBC   (3.8-10.6)  k/uL


 


RBC   (3.80-5.40)  m/uL


 


Hgb   (11.4-16.0)  gm/dL


 


Hct   (34.0-46.0)  %


 


RDW   (11.5-15.5)  %


 


PT   (9.0-12.0)  sec


 


INR   (<1.2)  


 


POC Glucose (mg/dL)  117 H  (75-99)  mg/dL








                      Microbiology - Last 24 Hours (Table)











 08/30/19 15:10 Gram Stain - Final





 Other - Other Wound Culture - Final





    Pseudomonas aeruginosa


 


 08/28/19 14:26 Blood Culture - Preliminary





 Blood    No Growth after 96 hours














Assessment and Plan


Assessment: 





#1 wet gangrene right heel status post right above-knee amputation


#2 peripheral arterial disease


#3 atrial fibrillation on anticoagulation


#4 hypertension


#5 diabetes


#6 neuropathy


#7 hypothyroidism


#8 hyperlipidemia


#9 chronic debility, nonambulatory


#10 anemia


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


Plan: 


Continue diet as tolerated, physical therapy to evaluate and treat.  Prosthetics

accompanying to evaluate and assist with dressing changes.  Coumadin dosing per 

pharmacy.  Patient did have a positive intraoperative culture at the level of 

the previous femoral-popliteal bypass graft.  The entirety of this graft was 

excised during the procedure.  Patient started on cefepime, infectious disease 

consult for further recommendations

## 2019-09-02 NOTE — PN
PROGRESS NOTE



84-year-old lady who is admitted to the hospital following a right above-knee

amputation for wet gangrene.  This morning the patient complains of discomfort at the

surgical site, but is otherwise doing well.



EXAM:

Comfortable at rest.  Vital signs are stable.  Chest exam reveals good air entry

bilaterally.  Heart exam reveals first and second heart sounds.  Exam of the

extremities reveals that the pulses are palpable over the left leg.



LABS:

Show a hemoglobin of 9.6, platelet count is 378. INR is 1.7.



ASSESSMENT:

1. Chronic atrial fibrillation.

2. Peripheral vascular disease status post right above-knee amputation.

3. Diabetes.

4. Hypertension.

5. Dyslipidemia.



PLAN:

The patient will continue Coumadin, pharmacy to dose.





MMODL / IJN: [id number] / Job#: [id number]

## 2019-09-02 NOTE — P.PN
Subjective


Progress Note Date: 09/02/19





This is an 84-year-old pleasant female followed by Dr. Pardo known history of 

diabetes mellitus type 2 requiring insulin, with PAD and other complications 

peripheral neuropathy hypertension, hypothyroidism atrial fibrillation, PAD, 

chronic gangrene/chronic ulcer diabetic foot ulcers right heel at least since 

March 2019, admitted to [facility] as the ulcers have failed to

heal, and they have discussed the necessity of amputation, and requested to be 

admitted for the vascular surgeon with Dr. Perez, he she was also seen by Dr. Ross in the past.  Cardiology was requested to see the patient consultation 

for preop, patient denies any history of CHF in the past, no history off CVA, no

documented history of myocardial infarction, we are going to request an EKG him 

up preop.  ProBNP, echocardiogram, with anticipation that the surgery would be 

done in the next 1-2 days.  Coumadin will be held,





8/29: Dr. Perez is planning for above-the-knee amputation tomorrow.  Coumadin 

remains on hold.  Cardiology consult requested. Echocardiogram reveals EF 55-

60%, mild concentric left ventricular hypertrophy, mild aortic regurgitation, 

moderate mitral regurgitation, moderate tricuspid regurgitation, mild pulmonary 

hypertension. Chest x-ray reveals chronic changes and cardiomegaly with small 2 

tiny bilateral pleural effusions diminished in size from prior.  Patient has 

been afebrile, heart rate 77, blood pressure 115/63 and pulse ox 99% on room 

air.  Repeat lab work reveals normal white count of 9.7, hemoglobin 6.5 and 1 

unit of packed RBCs will be ordered for transfusion.  BUN 56 and creatinine 

2.33.  Electrolytes within normal limits.  Blood sugar running between 121 and 

167.  Hemoglobin A1c is pending.Patient denies any new complaints.  Perez 

catheter is in place.  Sister is at the bedside.





8/30: Human globin this morning is 8.1 and Dr. Perez has ordered another unit of

packed RBCs.  She is scheduled for amputation this afternoon.  She has been seen

by cardiology this morning cleared for surgery.  Repeat lab work reveals white 

count of 12.3, INR 1.6, BUN 55 and creatinine 2.47, blood sugars running .

 Patient's sisters also at the bedside.  All questions have been answered.  

Patient denies any new complaints.





8/31 patient examined bedside complains of significant in the right lower stump.

 Vital examination the temperature is 98.7 blood pressure 120/58 leukocytosis 

improved from 91.4-14 hemoglobin has slightly troponin dropped from 11-10.3 

platelets and normal.  Patient is restarted on Coumadin post surgery.  Lantus 

increased to 25 units daily continue Tradjenta and insulin with insulin sliding 

scale.








9/1: Patient denies any new complaints.  No chest pain or shortness of breath.  

She denies any diarrhea or constipation.  INR today is at 1.7.  Pharmacy is 

dosing Coumadin.  Patient has been afebrile, heart rate 80, blood pressure 

140/65, pulse ox 99% on room air.  We will transfer the patient to [facility]

without telemetry.  Cardiac monitor has been atrial fibrillation with rate 

controlled.  Plan for discharge on Tuesday back to nursing home.





9/2 patient's intraoperative culture is positive for Pseudomonas.  Infectious 

disease consulted Leukocytosis is improving with a WBC of 12.9 glucose improved 

to 117.  Continue Levemir 25 units daily with NovoLog 10 units 3 times a day 

with sliding scale








 Review of Systems 


Constitutional: No fever, no chills, no night sweats.  No weight change.  

Reports weakness, fatigue or lethargy.  No daytime sleepiness.


EENT: No headache.  No blurred vision or double vision, no loss of vision.  No 

loss of Hearing, no ringing in the ears, no dizziness.  No nasal drainage or 

congestion.  No epistaxis.  No sore throat.


Lungs: No shortness of breath, cough, no sputum production.  No wheezing.


Cardiovascular: No chest pain, no lower extremity edema.  No palpitations.  No 

paroxysmal nocturnal dyspnea.  No orthopnea.  No lightheadedness or dizziness.  

No syncopal episodes.


Abdominal: No abdominal pain.  No nausea, vomiting.  No diarrhea.  No 

constipation.  No bloody or tarry stools..  No loss of appetite.


Genitourinary: No dysuria, increased frequency, urgency.  No urinary retention.


Musculoskeletal: Pain in the right lower extremity


Integumentary: Reports wounds, no lesions.  No rash or pruritus.  No unusual 

bruising.  No change in hair or nails.


Neurologic: No aphasia. No facial droop. No change in mentation. No head injury.

No headache. No paralysis. No paresthesia.


Psychiatric: No depression.  No anxiety.  No mood swings.


Endocrine: No abnormal blood sugars.  No weight change.  No excessive sweating 

or thirst.  No cold intolerance.  





Objective





- Vital Signs


Vital signs: 


                                   Vital Signs











Temp  98.3 F   09/02/19 12:00


 


Pulse  94   09/02/19 12:00


 


Resp  18   09/02/19 12:00


 


BP  109/63   09/02/19 12:00


 


Pulse Ox  97   09/02/19 12:00








                                 Intake & Output











 09/01/19 09/02/19 09/02/19





 18:59 06:59 18:59


 


Intake Total 480 120 480


 


Output Total 1400 1000 950


 


Balance -920 -880 -470


 


Weight   84 kg


 


Intake:   


 


  Intake, IV Titration  0 





  Amount   


 


    Lactated Ringers 1,000 ml  0 





    @ 0 mls/hr IV .STK-MED   





    ONE Rx#:[id number]   


 


  Oral 480 120 480


 


Output:   


 


  Urine 1400 1000 950


 


Other:   


 


  Voiding Method Indwelling Catheter Indwelling Catheter Indwelling Catheter














- Exam





- Constitutional


General appearance: cooperative, no acute distress, resting in bed, obese





- EENT


Eyes: anicteric sclerae, EOMI, dentition normal, normal appearance


ENT: hearing grossly normal, NA/AT, normal oropharynx





- Neck


Neck: normal ROM





- Respiratory


Respiratory: bilateral: CTA, negative: diminished, dullness, rales





- Cardiovascular


Rhythm: regular


Heart sounds: normal: S1, S2


Abnormal Heart Sounds: no systolic murmur, no diastolic murmur, no rub, no S3 

Gallop, no S4 Gallop, no click, no other





- Gastrointestinal


General gastrointestinal: normal bowel sounds, soft





- Integumentary


Integumentary: Normal





- Neurologic


Neurologic: CNII-XII intact





- Musculoskeletal


Musculoskeletal: Right lower extremities done under dressing no oozing or 

drainage seen on the dressing.  Perez catheter and his





- Psychiatric


Psychiatric: A&O x's 3, appropriate affect, intact judgment & insight








- Labs


CBC & Chem 7: 


                                 09/02/19 06:29





                                 08/30/19 05:57


Labs: 


                  Abnormal Lab Results - Last 24 Hours (Table)











  09/01/19 09/01/19 09/02/19 Range/Units





  18:48 20:27 06:29 


 


WBC    12.9 H  (3.8-10.6)  k/uL


 


RBC    3.42 L  (3.80-5.40)  m/uL


 


Hgb    9.6 L  (11.4-16.0)  gm/dL


 


Hct    29.9 L  (34.0-46.0)  %


 


RDW    15.9 H  (11.5-15.5)  %


 


PT     (9.0-12.0)  sec


 


INR     (<1.2)  


 


POC Glucose (mg/dL)  208 H  238 H   (75-99)  mg/dL














  09/02/19 09/02/19 09/02/19 Range/Units





  06:29 06:31 12:11 


 


WBC     (3.8-10.6)  k/uL


 


RBC     (3.80-5.40)  m/uL


 


Hgb     (11.4-16.0)  gm/dL


 


Hct     (34.0-46.0)  %


 


RDW     (11.5-15.5)  %


 


PT  17.1 H    (9.0-12.0)  sec


 


INR  1.7 H    (<1.2)  


 


POC Glucose (mg/dL)   117 H  184 H  (75-99)  mg/dL








                      Microbiology - Last 24 Hours (Table)











 08/28/19 14:26 Blood Culture - Preliminary





 Blood    No Growth after 120 hours


 


 08/30/19 15:10 Gram Stain - Final





 Other - Other Wound Culture - Final





    Pseudomonas aeruginosa














Assessment and Plan


Plan: 





1.  Diabetic foot ulcer, involving right heel, with wet gangrene, known history 

of PAD, failed to heal despite adequate length of time as an outpatient, 

approximately 6 months.  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  Coumadin 

restarted Echocardiogram as above.  





2. Iron deficiency anemia or anemia of chronic disease.  Iron studies pending.  

Transfuse 1 unit of packed RBCs followed by Lasix 20 mg IV push.  





3.  Paroxysmal atrial fibrillation on chronic Coumadin for anticoagulation 

continue metoprolol 100 twice daily





4.  CKD stage IV, creatinine of 2.34, avoid nephrotoxins and hypotension,





5.  Diabetes mellitus type 2 with diabetic neuropathy.  Continue Lantus 20 units

at bedtime, hold patient's scheduled 6 units with meals.  Continue NovoLog scale

before meals and at bedtime.  Hemoglobin A1c.


 


6.  Hypertension.  Continue amlodipine 5 mg daily, losartan 100 mg daily, 

Lopressor 100 mg twice daily, hydralazine 100 mg 3 times daily, Lasix 40 orally 

milligrams daily, Aldactone 12.5 mg daily.  





7.  Hyperlipidemia.  Continue Pravachol 80 mg daily.





8.  Mild protein calorie malnutrition.  Continue Glucerna.





9.  Generalized debility.  PT and OT to follow after surgery.





10.  DVT prophylaxis.  On Coumadin





11.  GI prophylaxis.  Pepcid





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 

amputation positive for Pseudomonas infected disease consulted cefepime 

initiated





CODE STATUS: Full code

## 2019-09-03 VITALS — TEMPERATURE: 98.6 F | DIASTOLIC BLOOD PRESSURE: 60 MMHG | SYSTOLIC BLOOD PRESSURE: 126 MMHG | HEART RATE: 72 BPM

## 2019-09-03 VITALS — RESPIRATION RATE: 20 BRPM

## 2019-09-03 LAB
GLUCOSE BLD-MCNC: 114 MG/DL (ref 75–99)
GLUCOSE BLD-MCNC: 147 MG/DL (ref 75–99)
INR PPP: 1.9 (ref ?–1.2)
PT BLD: 19 SEC (ref 9–12)

## 2019-09-03 RX ADMIN — INSULIN ASPART SCH UNIT: 100 INJECTION, SOLUTION INTRAVENOUS; SUBCUTANEOUS at 06:40

## 2019-09-03 RX ADMIN — DOCUSATE SODIUM AND SENNOSIDES SCH EACH: 50; 8.6 TABLET ORAL at 08:26

## 2019-09-03 RX ADMIN — Medication SCH UNIT: at 08:26

## 2019-09-03 RX ADMIN — METOPROLOL TARTRATE SCH MG: 50 TABLET, FILM COATED ORAL at 08:26

## 2019-09-03 RX ADMIN — LOSARTAN POTASSIUM SCH MG: 50 TABLET, FILM COATED ORAL at 08:26

## 2019-09-03 RX ADMIN — LORATADINE SCH MG: 10 TABLET ORAL at 08:26

## 2019-09-03 RX ADMIN — INSULIN ASPART SCH: 100 INJECTION, SOLUTION INTRAVENOUS; SUBCUTANEOUS at 06:40

## 2019-09-03 RX ADMIN — HYDROCODONE BITARTRATE AND ACETAMINOPHEN PRN EACH: 5; 325 TABLET ORAL at 06:39

## 2019-09-03 RX ADMIN — INSULIN ASPART SCH: 100 INJECTION, SOLUTION INTRAVENOUS; SUBCUTANEOUS at 12:11

## 2019-09-03 RX ADMIN — INSULIN ASPART SCH UNIT: 100 INJECTION, SOLUTION INTRAVENOUS; SUBCUTANEOUS at 12:52

## 2019-09-03 RX ADMIN — FUROSEMIDE SCH MG: 40 TABLET ORAL at 08:26

## 2019-09-03 RX ADMIN — SPIRONOLACTONE SCH MG: 25 TABLET, FILM COATED ORAL at 08:26

## 2019-09-03 RX ADMIN — ASPIRIN 81 MG CHEWABLE TABLET SCH MG: 81 TABLET CHEWABLE at 08:26

## 2019-09-03 RX ADMIN — SODIUM CHLORIDE SCH DROPS: 50 SOLUTION/ DROPS OPHTHALMIC at 08:25

## 2019-09-03 RX ADMIN — HYDROCODONE BITARTRATE AND ACETAMINOPHEN PRN EACH: 5; 325 TABLET ORAL at 15:57

## 2019-09-03 RX ADMIN — LEVOTHYROXINE SODIUM SCH MCG: 50 TABLET ORAL at 06:40

## 2019-09-03 RX ADMIN — POTASSIUM CHLORIDE SCH MEQ: 750 TABLET, EXTENDED RELEASE ORAL at 08:26

## 2019-09-03 RX ADMIN — FLUTICASONE PROPIONATE SCH SPRAY: 50 SPRAY, METERED NASAL at 08:26

## 2019-09-03 NOTE — P.PN
Subjective


Progress Note Date: 09/03/19


Principal diagnosis: 





right lower extremity wet gangrene s/p AKA





Patient seen and examined.  Doing well with no complaints at this time.  She 

states the pain is present but improving slightly.  She denies any fevers, 

chills, chest pain or shortness of breath.





Objective





- Vital Signs


Vital signs: 


                                   Vital Signs











Temp  98.6 F   09/03/19 07:00


 


Pulse  72   09/03/19 07:00


 


Resp  20   09/03/19 07:00


 


BP  126/60   09/03/19 07:00


 


Pulse Ox  98   09/03/19 07:00








                                 Intake & Output











 09/02/19 09/03/19 09/03/19





 18:59 06:59 18:59


 


Intake Total 720 120 120


 


Output Total 950 300 


 


Balance -230 -180 120


 


Weight 84 kg  


 


Intake:   


 


  Oral 720 120 120


 


Output:   


 


  Urine 950 300 


 


Other:   


 


  Voiding Method Indwelling Catheter Indwelling Catheter 














- Exam





Pleasant female in no acute distress lying in bed.


R AKA amputation site is clean, dry and intact.  Suture line is without any 

drainage or signs of infection.





- Labs


CBC & Chem 7: 


                                 09/02/19 06:29





                                 08/30/19 05:57


Labs: 


                  Abnormal Lab Results - Last 24 Hours (Table)











  09/02/19 09/02/19 09/02/19 Range/Units





  12:11 17:39 20:21 


 


PT     (9.0-12.0)  sec


 


INR     (<1.2)  


 


POC Glucose (mg/dL)  184 H  152 H  164 H  (75-99)  mg/dL














  09/03/19 09/03/19 Range/Units





  05:52 06:27 


 


PT  19.0 H   (9.0-12.0)  sec


 


INR  1.9 H   (<1.2)  


 


POC Glucose (mg/dL)   147 H  (75-99)  mg/dL








                      Microbiology - Last 24 Hours (Table)











 08/28/19 14:26 Blood Culture - Preliminary





 Blood    No Growth after 120 hours














Assessment and Plan


Assessment: 











#1 wet gangrene right heel status post right above-knee amputation


#2 peripheral arterial disease


#3 atrial fibrillation on anticoagulation


#4 hypertension


#5 diabetes


#6 neuropathy


#7 hypothyroidism


#8 hyperlipidemia


#9 chronic debility, nonambulatory


#10 anemia


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





Plan: 


 


Continue diet as tolerated, physical therapy to evaluate and treat.  Prosthetics

accompanying continue with dressing changes.  Coumadin dosing per pharmacy.  ID 

for antibiotic management.  Discharge planning.

## 2019-09-03 NOTE — P.DS
Providers


Date of admission: 


08/28/19 13:01





Expected date of discharge: 09/03/19


Attending physician: 


Azalea Pardo





Consults: 





                                        





08/28/19 14:35


Consult Physician Routine 


   Consulting Provider: Christy Perez


   Consult Reason/Comments: gangrene right foot


   Do you want consulting provider notified?: Yes





08/29/19 14:34


Consult Physician Routine 


   Consulting Provider: Sergio Cabral


   Consult Reason/Comments: preop clearance


   Do you want consulting provider notified?: Yes





09/02/19 13:24


Consult Physician Routine 


   Consulting Provider: Chon Stovall


   Consult Reason/Comments: wound culture


   Do you want consulting provider notified?: Already Contacted











Primary care physician: 


Azalea Pardo





[facility] Course: 





This is an 84-year-old pleasant female followed by Dr. Pardo known history of 

diabetes mellitus type 2 requiring insulin, with PAD and other complications 

peripheral neuropathy hypertension, hypothyroidism atrial fibrillation, PAD, 

chronic gangrene/chronic ulcer diabetic foot ulcers right heel at least since 

March 2019, admitted to [facility] as the ulcers have failed to

heal, and they have discussed the necessity of amputation, and requested to be 

admitted for the vascular surgeon with Dr. Perez, he she was also seen by Dr. Ross in the past.  Cardiology was requested to see the patient consultation 

for preop, patient denies any history of CHF in the past, no history off CVA, no

documented history of myocardial infarction, we are going to request an EKG him 

up preop.  ProBNP, echocardiogram, with anticipation that the surgery would be 

done in the next 1-2 days.  Coumadin will be held,





8/29: Dr. Perez is planning for above-the-knee amputation tomorrow.  Coumadin 

remains on hold.  Cardiology consult requested. Echocardiogram reveals EF 55-

60%, mild concentric left ventricular hypertrophy, mild aortic regurgitation, 

moderate mitral regurgitation, moderate tricuspid regurgitation, mild pulmonary 

hypertension. Chest x-ray reveals chronic changes and cardiomegaly with small 2 

tiny bilateral pleural effusions diminished in size from prior.  Patient has 

been afebrile, heart rate 77, blood pressure 115/63 and pulse ox 99% on room 

air.  Repeat lab work reveals normal white count of 9.7, hemoglobin 6.5 and 1 

unit of packed RBCs will be ordered for transfusion.  BUN 56 and creatinine 

2.33.  Electrolytes within normal limits.  Blood sugar running between 121 and 

167.  Hemoglobin A1c is pending.Patient denies any new complaints.  Perez 

catheter is in place.  Sister is at the bedside.





8/30: Human globin this morning is 8.1 and Dr. Perez has ordered another unit of

packed RBCs.  She is scheduled for amputation this afternoon.  She has been seen

by cardiology this morning cleared for surgery.  Repeat lab work reveals white 

count of 12.3, INR 1.6, BUN 55 and creatinine 2.47, blood sugars running .

 Patient's sisters also at the bedside.  All questions have been answered.  

Patient denies any new complaints.





8/31 patient examined bedside complains of significant in the right lower stump.

 Vital examination the temperature is 98.7 blood pressure 120/58 leukocytosis 

improved from 91.4-14 hemoglobin has slightly troponin dropped from 11-10.3 

platelets and normal.  Patient is restarted on Coumadin post surgery.  Lantus 

increased to 25 units daily continue Tradjenta and insulin with insulin sliding 

scale.








9/1: Patient denies any new complaints.  No chest pain or shortness of breath.  

She denies any diarrhea or constipation.  INR today is at 1.7.  Pharmacy is 

dosing Coumadin.  Patient has been afebrile, heart rate 80, blood pressure 

140/65, pulse ox 99% on room air.  We will transfer the patient to [facility] floor

without telemetry.  Cardiac monitor has been atrial fibrillation with rate 

controlled.  Plan for discharge on Tuesday back to nursing home.





9/2 patient's intraoperative culture is positive for Pseudomonas.  Infectious 

disease consulted Leukocytosis is improving with a WBC of 12.9 glucose improved 

to 117.  Continue Levemir 25 units daily with NovoLog 10 units 3 times a day 

with sliding scale





9/3: Dr. Stovall has recommended 2 weeks of oral Cipro.  This has been adjusted 

for renal failure.  Blood sugars have been elevated during her stay and insulin-

dependent been adjusted.  INR today is 1.7, pharmacy is dosing Coumadin and we 

will plan for discharge at 4 mg daily.  White count at 14.4, hemoglobin 9.4.  P

atient will be discharged back to nursing home in stable condition.








Discharge diagnoses:


1.  Diabetic foot ulcer, involving right heel, with wet gangrene, known history 

of PAD, status post above knee amputation on 8/30.  


2.  Iron deficiency anemia, anemia of chronic disease.  


3.  Paroxysmal atrial fibrillation 


4.  CKD stage IV


5.  Diabetes mellitus type 2 with diabetic neuropathy. 


6.  Hypertension.  


7.  Hyperlipidemia.  


8.  Mild protein calorie malnutrition.  


9.  Generalized debility.  


10.  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 

amputation positive for Pseudomonas





Discharge plan: Return to [facility]





Impression and plan of care have been directed as dictated by the signing 

physician.  Candy Kumar nurse practitioner acting as scribe for signing 

physician.








Patient Condition at Discharge: Good





Plan - Discharge Summary


Discharge Rx Participant: Yes


New Discharge Prescriptions: 


New


   Warfarin [Coumadin] 4 mg PO DAILY@1800  tab


   HYDROcodone/APAP 5-325MG [Norco 5-325] 1 each PO Q4HR PRN #18 tab


     PRN Reason: Pain Scale 6 To 7


   INSULIN ASPART (NovoLOG) [NovoLOG (formulary)] 0 unit SQ ACHS  vial


   Ciprofloxacin HCl [Cipro] 250 mg PO DAILY #14 tablet





Continue


   Aspirin 81 mg PO DAILY


   Pravastatin Sodium [Pravachol] 80 mg PO DAILY@1800


   Levothyroxine Sodium [Synthroid] 50 mcg PO QAM


   Sennosides-Docusate Sodium [Senokot-S] 1 tab PO DAILY


   Sodium Chloride 5% Ophth Soln [Vahid 128] 1 drop BOTH EYES BID


   Multivitamins, Thera [Multivitamin (formulary)] 1 tab PO HS


   Potassium Chloride ER [K-Dur 10] 10 meq PO DAILY


   Furosemide [Lasix] 40 mg PO DAILY #0


   Amino Acids/Protein Hydrolys [Pro-Stat Supplement] 30 ml PO DAILY


   amLODIPine BESYLATE 5 mg PO DAILY


   Spironolactone [Aldactone] 12.5 mg PO DAILY


   hydrALAZINE HCL [Apresoline] 100 mg PO TID


   Metoprolol Tartrate [Lopressor] 100 mg PO BID


   Losartan Potassium 100 mg PO DAILY


   sitaGLIPtin PHOSPHATE [Januvia] 50 mg PO DAILY@1800


   Cranberry 450mg 450 mg PO DAILY


   Bisacodyl 5 mg PO DAILY PRN


     PRN Reason: Constipation


   Cholecalciferol (Vitamin D3) [Vitamin D3] 2,000 unit PO DAILY


   Fluticasone Nasal Spray [Flonase Nasal Spray] 1 spr EA NOSTRIL DAILY


   Ferrous Sulfate [Feosol] 325 mg PO DAILY


   Loratadine [Claritin] 10 mg PO DAILY





Changed


   INSULIN LISPRO (humaLOG) [humaLOG] 10 units SQ AC-TID #0


   Insulin Detemir (Levemir) [Levemir] 25 unit SQ DAILY@1800 #0





Discontinued


   Warfarin [Coumadin] 2.5 mg PO HS


   Amoxic-Pot Clav 875-125Mg [Augmentin 875-125] 1 tab PO Q12HR


Discharge Medication List





Aspirin 81 mg PO DAILY 02/08/16 [History]


Levothyroxine Sodium [Synthroid] 50 mcg PO QAM 02/08/16 [History]


Pravastatin Sodium [Pravachol] 80 mg PO DAILY@1800 02/08/16 [History]


Sennosides-Docusate Sodium [Senokot-S] 1 tab PO DAILY 11/13/17 [History]


Multivitamins, Thera [Multivitamin (formulary)] 1 tab PO HS 10/01/18 [History]


Potassium Chloride ER [K-Dur 10] 10 meq PO DAILY 10/01/18 [History]


Sodium Chloride 5% Ophth Soln [Vahid 128] 1 drop BOTH EYES BID 10/01/18 [History]


Furosemide [Lasix] 40 mg PO DAILY #0 10/05/18 [Rx]


Amino Acids/Protein Hydrolys [Pro-Stat Supplement] 30 ml PO DAILY 11/07/18 [

History]


amLODIPine BESYLATE 5 mg PO DAILY 11/07/18 [History]


Spironolactone [Aldactone] 12.5 mg PO DAILY 11/28/18 [History]


Cranberry 450mg 450 mg PO DAILY 03/12/19 [History]


Losartan Potassium 100 mg PO DAILY 03/12/19 [History]


Metoprolol Tartrate [Lopressor] 100 mg PO BID 03/12/19 [History]


hydrALAZINE HCL [Apresoline] 100 mg PO TID 03/12/19 [History]


sitaGLIPtin PHOSPHATE [Januvia] 50 mg PO DAILY@1800 03/12/19 [History]


Bisacodyl 5 mg PO DAILY PRN 08/28/19 [History]


Cholecalciferol (Vitamin D3) [Vitamin D3] 2,000 unit PO DAILY 08/28/19 [History]


Ferrous Sulfate [Feosol] 325 mg PO DAILY 08/28/19 [History]


Fluticasone Nasal Spray [Flonase Nasal Spray] 1 spr EA NOSTRIL DAILY 08/28/19 

[History]


Loratadine [Claritin] 10 mg PO DAILY 08/28/19 [History]


Ciprofloxacin HCl [Cipro] 250 mg PO DAILY #14 tablet 09/03/19 [Rx]


HYDROcodone/APAP 5-325MG [Crawford 5-325] 1 each PO Q4HR PRN #18 tab 09/03/19 [Rx]


INSULIN ASPART (NovoLOG) [NovoLOG (formulary)] 0 unit SQ ACHS  vial 09/03/19 

[Rx]


INSULIN LISPRO (humaLOG) [humaLOG] 10 units SQ AC-TID #0 09/03/19 [Rx]


Insulin Detemir (Levemir) [Levemir] 25 unit SQ DAILY@1800 #0 09/03/19 [Rx]


Warfarin [Coumadin] 4 mg PO DAILY@1800  tab 09/03/19 [Rx]








Follow up Appointment(s)/Referral(s): 


Azalea Pardo MD [Primary Care Provider] - 1 Week (at ECF)


Discharge Disposition: TRANSFER TO SNF/ECF

## 2019-09-03 NOTE — PN
PROGRESS NOTE



DATE OF SERVICE:

09/03/2019



REASON FOR FOLLOWUP:

Right tibia-femoral graft site infection with pseudomonas.



INTERVAL HISTORY:

The patient is currently afebrile.  The patient has been complaining of pain to the

right AKA stump area.  The patient denies having any chest pain, shortness of breath or

cough.  No nausea, no vomiting, no abdominal pain and no diarrhea.



PHYSICAL EXAMINATION:

Blood pressure 126/60 with a pulse of 72, temperature 98.6. She is 98% on room air.

General description is an elderly female lying in bed in no distress.

RESPIRATORY SYSTEM: Unlabored breathing. Clear to auscultation anteriorly.

HEART: S1, S2.  Regular rate and rhythm.

ABDOMEN: Soft. No tenderness.

Right AKA stump is currently dressed up.  No obvious drainage on the dressing.



INR is 1.9.



DIAGNOSTIC IMPRESSION AND PLAN:

Patient with Pseudomonas aeruginosa culture positive from a fem-tib bypass graft which

has been removed completely, and there was no evidence of any bacteremia.  Currently on

cefepime; to continue, transitioning to a short course of oral Cipro in the outpatient

setting and careful monitoring of her INR.  Continue with supportive care.





MMODL / IJN: [id number] / Job#: [id number]

## 2019-09-06 NOTE — CDI
high





Documentation Clarification Form



Date: 9/6/2019 7:56:00 AM

From: Ailyn Monteiro

Phone: If you have a question regarding this query, please contact Diana Diallo at [phone] between 8am and 5pm.

MRN: [id number]

Admit Date: 8/28/2019 1:01:00 PM

Patient Name: Dinah Field

Visit Number: [id number]

Discharge Date:  9/3/2019 5:35:00 PM





ATTENTION: The Clinical Documentation Specialists (CDI) and [facility] Coding Staff 
appreciate your assistance in clarifying documentation. Please respond to the 
clarification below the line at the bottom and electronically sign. The CDI & 
[facility] Coding staff will review the response and follow-up if needed. Please note: 
Queries are made part of the Legal Health Record. If you have any questions, 
please contact the author of this message via ITS.



Dr. Christy Perez



The patient presented with diabetic peripheral vascular disease with wet 
gangrene of the right heel.



History/Risk Factors:  Peripheral vascular disease with previous femoral to 
tibial bypass, DM, right nonhealing heel ulcer

Clinical Indicators: Purulent drainage and foul odor of right heel ulcer 
documented in the nursing documentation

Treatment: Above the knee amputation



In your professional opinion, can you please clarify the level of the 
amputation?

        High (proximal)

        Mid

        Low (distal)

        Other, please specify ________

        Unable to determine

__________________________________________________________________________

MTDD

## 2019-09-10 NOTE — CDI
High





Documentation Clarification Form



Date: 9/10/19

From: Ailyn Monteiro

Phone: If you have a question regarding this query, please contact Diana Diallo at [phone] between 8am and 5pm.

MRN: [id number]

Admit Date: 8/28/2019 1:01:00 PM

Patient Name: Dinah Field

Visit Number: [id number]

Discharge Date:  9/3/2019 5:35:00 PM





ATTENTION: The Clinical Documentation Specialists (CDI) and [facility] Coding Staff 
appreciate your assistance in clarifying documentation. Please respond to the 
clarification below the line at the bottom and electronically sign. The CDI & 
[facility] Coding staff will review the response and follow-up if needed. Please note: 
Queries are made part of the Legal Health Record. If you have any questions, 
please contact the author of this message via ITS.



Dr. Christy Perez



Thank you for signing your previous query.  Please document a response before 
signing this query.

The patient presented with diabetic peripheral vascular disease with wet 
gangrene of the right heel.



History/Risk Factors:  Peripheral vascular disease with previous femoral to 
tibial bypass, DM, right nonhealing heel ulcer

Clinical Indicators: Purulent drainage and foul odor of right heel ulcer 
documented in the nursing documentation

Treatment: Above the knee amputation



In your professional opinion, can you please clarify the level of the 
amputation?

        High (proximal)

        Mid

        Low (distal)

        Other, please specify ________

        Unable to determine

__________________________________________________________________________

MTDD

## 2020-06-08 ENCOUNTER — HOSPITAL ENCOUNTER (INPATIENT)
Dept: HOSPITAL 47 - EC | Age: 85
LOS: 7 days | Discharge: NURSING, REHAB OR LONG-TERM CARE | DRG: 498 | End: 2020-06-15
Attending: INTERNAL MEDICINE | Admitting: INTERNAL MEDICINE
Payer: MEDICARE

## 2020-06-08 VITALS — BODY MASS INDEX: 30.9 KG/M2

## 2020-06-08 DIAGNOSIS — E87.2: ICD-10-CM

## 2020-06-08 DIAGNOSIS — J30.9: ICD-10-CM

## 2020-06-08 DIAGNOSIS — Z79.4: ICD-10-CM

## 2020-06-08 DIAGNOSIS — Z79.899: ICD-10-CM

## 2020-06-08 DIAGNOSIS — I48.0: ICD-10-CM

## 2020-06-08 DIAGNOSIS — T87.43: Primary | ICD-10-CM

## 2020-06-08 DIAGNOSIS — T36.8X5A: ICD-10-CM

## 2020-06-08 DIAGNOSIS — N17.0: ICD-10-CM

## 2020-06-08 DIAGNOSIS — A41.9: ICD-10-CM

## 2020-06-08 DIAGNOSIS — Z98.890: ICD-10-CM

## 2020-06-08 DIAGNOSIS — B96.89: ICD-10-CM

## 2020-06-08 DIAGNOSIS — E78.5: ICD-10-CM

## 2020-06-08 DIAGNOSIS — E11.51: ICD-10-CM

## 2020-06-08 DIAGNOSIS — Z86.19: ICD-10-CM

## 2020-06-08 DIAGNOSIS — Z98.42: ICD-10-CM

## 2020-06-08 DIAGNOSIS — L03.115: ICD-10-CM

## 2020-06-08 DIAGNOSIS — Z82.49: ICD-10-CM

## 2020-06-08 DIAGNOSIS — E03.9: ICD-10-CM

## 2020-06-08 DIAGNOSIS — Z87.440: ICD-10-CM

## 2020-06-08 DIAGNOSIS — Z85.41: ICD-10-CM

## 2020-06-08 DIAGNOSIS — Z11.59: ICD-10-CM

## 2020-06-08 DIAGNOSIS — Z90.710: ICD-10-CM

## 2020-06-08 DIAGNOSIS — Z79.82: ICD-10-CM

## 2020-06-08 DIAGNOSIS — Z90.89: ICD-10-CM

## 2020-06-08 DIAGNOSIS — Z99.3: ICD-10-CM

## 2020-06-08 DIAGNOSIS — Z82.3: ICD-10-CM

## 2020-06-08 DIAGNOSIS — T83.511A: ICD-10-CM

## 2020-06-08 DIAGNOSIS — H91.90: ICD-10-CM

## 2020-06-08 DIAGNOSIS — N39.0: ICD-10-CM

## 2020-06-08 DIAGNOSIS — D50.9: ICD-10-CM

## 2020-06-08 DIAGNOSIS — E55.9: ICD-10-CM

## 2020-06-08 DIAGNOSIS — Z79.01: ICD-10-CM

## 2020-06-08 DIAGNOSIS — M19.042: ICD-10-CM

## 2020-06-08 DIAGNOSIS — E11.22: ICD-10-CM

## 2020-06-08 DIAGNOSIS — I13.10: ICD-10-CM

## 2020-06-08 DIAGNOSIS — Z79.890: ICD-10-CM
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
## 2020-06-08 DIAGNOSIS — B96.4: ICD-10-CM

## 2020-06-08 DIAGNOSIS — M19.041: ICD-10-CM

## 2020-06-08 DIAGNOSIS — N18.3: ICD-10-CM

## 2020-06-08 DIAGNOSIS — B96.5: ICD-10-CM

## 2020-06-08 DIAGNOSIS — Z98.41: ICD-10-CM

## 2020-06-08 DIAGNOSIS — Z86.14: ICD-10-CM

## 2020-06-08 LAB
ALBUMIN SERPL-MCNC: 3.4 G/DL (ref 3.5–5)
ALP SERPL-CCNC: 69 U/L (ref 38–126)
ALT SERPL-CCNC: 15 U/L (ref 4–34)
ANION GAP SERPL CALC-SCNC: 10 MMOL/L
APTT BLD: 31.4 SEC (ref 22–30)
AST SERPL-CCNC: 19 U/L (ref 14–36)
BASOPHILS # BLD AUTO: 0.1 K/UL (ref 0–0.2)
BASOPHILS NFR BLD AUTO: 1 %
BUN SERPL-SCNC: 63 MG/DL (ref 7–17)
CALCIUM SPEC-MCNC: 9.4 MG/DL (ref 8.4–10.2)
CHLORIDE SERPL-SCNC: 110 MMOL/L (ref 98–107)
CO2 SERPL-SCNC: 17 MMOL/L (ref 22–30)
EOSINOPHIL # BLD AUTO: 0.2 K/UL (ref 0–0.7)
EOSINOPHIL NFR BLD AUTO: 2 %
ERYTHROCYTE [DISTWIDTH] IN BLOOD BY AUTOMATED COUNT: 3.08 M/UL (ref 3.8–5.4)
ERYTHROCYTE [DISTWIDTH] IN BLOOD: 14.1 % (ref 11.5–15.5)
GLUCOSE BLD-MCNC: 145 MG/DL (ref 75–99)
GLUCOSE SERPL-MCNC: 222 MG/DL (ref 74–99)
HCT VFR BLD AUTO: 27.9 % (ref 34–46)
HGB BLD-MCNC: 8.9 GM/DL (ref 11.4–16)
INR PPP: 1.6 (ref ?–1.2)
LYMPHOCYTES # SPEC AUTO: 1.3 K/UL (ref 1–4.8)
LYMPHOCYTES NFR SPEC AUTO: 11 %
MCH RBC QN AUTO: 28.8 PG (ref 25–35)
MCHC RBC AUTO-ENTMCNC: 31.9 G/DL (ref 31–37)
MCV RBC AUTO: 90.3 FL (ref 80–100)
MONOCYTES # BLD AUTO: 0.9 K/UL (ref 0–1)
MONOCYTES NFR BLD AUTO: 8 %
NEUTROPHILS # BLD AUTO: 9 K/UL (ref 1.3–7.7)
NEUTROPHILS NFR BLD AUTO: 77 %
PH UR: 7 [PH] (ref 5–8)
PLATELET # BLD AUTO: 307 K/UL (ref 150–450)
POTASSIUM SERPL-SCNC: 4.9 MMOL/L (ref 3.5–5.1)
PROT SERPL-MCNC: 7 G/DL (ref 6.3–8.2)
PROT UR QL: (no result)
PT BLD: 16.2 SEC (ref 9–12)
RBC UR QL: 90 /HPF (ref 0–5)
SODIUM SERPL-SCNC: 137 MMOL/L (ref 137–145)
SP GR UR: 1.01 (ref 1–1.03)
UROBILINOGEN UR QL STRIP: <2 MG/DL (ref ?–2)
WBC # BLD AUTO: 11.7 K/UL (ref 3.8–10.6)
WBC # UR AUTO: >182 /HPF (ref 0–5)

## 2020-06-08 PROCEDURE — 51702 INSERT TEMP BLADDER CATH: CPT

## 2020-06-08 PROCEDURE — 85730 THROMBOPLASTIN TIME PARTIAL: CPT

## 2020-06-08 PROCEDURE — 87070 CULTURE OTHR SPECIMN AEROBIC: CPT

## 2020-06-08 PROCEDURE — 96367 TX/PROPH/DG ADDL SEQ IV INF: CPT

## 2020-06-08 PROCEDURE — 81001 URINALYSIS AUTO W/SCOPE: CPT

## 2020-06-08 PROCEDURE — 96365 THER/PROPH/DIAG IV INF INIT: CPT

## 2020-06-08 PROCEDURE — 87186 SC STD MICRODIL/AGAR DIL: CPT

## 2020-06-08 PROCEDURE — 83605 ASSAY OF LACTIC ACID: CPT

## 2020-06-08 PROCEDURE — 86140 C-REACTIVE PROTEIN: CPT

## 2020-06-08 PROCEDURE — 87086 URINE CULTURE/COLONY COUNT: CPT

## 2020-06-08 PROCEDURE — 85025 COMPLETE CBC W/AUTO DIFF WBC: CPT

## 2020-06-08 PROCEDURE — 99285 EMERGENCY DEPT VISIT HI MDM: CPT

## 2020-06-08 PROCEDURE — 80202 ASSAY OF VANCOMYCIN: CPT

## 2020-06-08 PROCEDURE — 87077 CULTURE AEROBIC IDENTIFY: CPT

## 2020-06-08 PROCEDURE — 87040 BLOOD CULTURE FOR BACTERIA: CPT

## 2020-06-08 PROCEDURE — 93005 ELECTROCARDIOGRAM TRACING: CPT

## 2020-06-08 PROCEDURE — 85610 PROTHROMBIN TIME: CPT

## 2020-06-08 PROCEDURE — 80053 COMPREHEN METABOLIC PANEL: CPT

## 2020-06-08 PROCEDURE — 36415 COLL VENOUS BLD VENIPUNCTURE: CPT

## 2020-06-08 PROCEDURE — 87075 CULTR BACTERIA EXCEPT BLOOD: CPT

## 2020-06-08 PROCEDURE — 80048 BASIC METABOLIC PNL TOTAL CA: CPT

## 2020-06-08 PROCEDURE — 85652 RBC SED RATE AUTOMATED: CPT

## 2020-06-08 PROCEDURE — 36573 INSJ PICC RS&I 5 YR+: CPT

## 2020-06-08 PROCEDURE — 87205 SMEAR GRAM STAIN: CPT

## 2020-06-08 RX ADMIN — INSULIN ASPART SCH UNIT: 100 INJECTION, SOLUTION INTRAVENOUS; SUBCUTANEOUS at 21:56

## 2020-06-08 RX ADMIN — SODIUM CHLORIDE SCH DROPS: 50 SOLUTION/ DROPS OPHTHALMIC at 21:56

## 2020-06-08 RX ADMIN — INSULIN DETEMIR SCH UNIT: 100 INJECTION, SOLUTION SUBCUTANEOUS at 21:56

## 2020-06-08 RX ADMIN — METOPROLOL TARTRATE SCH MG: 50 TABLET, FILM COATED ORAL at 21:57

## 2020-06-08 RX ADMIN — WARFARIN SODIUM SCH MG: 2 TABLET ORAL at 21:57

## 2020-06-08 RX ADMIN — CEFAZOLIN SCH MLS/HR: 330 INJECTION, POWDER, FOR SOLUTION INTRAMUSCULAR; INTRAVENOUS at 21:57

## 2020-06-08 RX ADMIN — LOSARTAN POTASSIUM SCH MG: 50 TABLET, FILM COATED ORAL at 21:57

## 2020-06-08 NOTE — ED
General Adult HPI





- General


Chief complaint: Wound/Laceration


Stated complaint: Poss Infection


Time Seen by Provider: 20 15:43


Source: EMS, RN notes reviewed, old records reviewed


Mode of arrival: EMS


Limitations: physical limitation





- History of Present Illness


Initial comments: 


85-year-old female patient with past history significant for right lower 

extremity above-knee amputation is sent from rehabilitation facility with 

concerns of infection.  Patient denies any acute complaints at this time.  

Denies any other complaints.





Systemic: Pt denies fatigue, fever/chills, rash. Pt denies weakness, night 

sweats, weight loss. 


Neuro: Pt denies headache, visual disturbances, syncope or pre-syncope.


HEENT: Pt denies ocular discharge or irritation, otalgia, rhinorrhea, 

pharyngitis or notable lymphadenopathy. 


Cardiopulmonary: Pt denies chest pain, SOB, heart palpitations, dyspnea on 

exertion.  


Abdominal/GI: Pt denies abdominal pain, n/v/d. 


: Pt denies dysuria, burning w/ urination, frequency/urgency. Denies new onset

urinary or bowel incontinence.  


MSK: Pt denies myalgia, loss of strength or function in extremities. 


Neuro: Pt denies new onset weakness, paresthesias. 








- Related Data


                                Home Medications











 Medication  Instructions  Recorded  Confirmed


 


Aspirin 81 mg PO DAILY 16


 


Levothyroxine Sodium [Synthroid] 50 mcg PO QAM 16


 


Pravastatin Sodium [Pravachol] 80 mg PO DAILY@1800 16


 


Sennosides-Docusate Sodium 1 tab PO DAILY 17





[Senokot-S]   


 


Multivitamins, Thera [Multivitamin 1 tab PO DAILY@1800 10/01/18 06/08/20





(formulary)]   


 


Sodium Chloride 5% Ophth Soln 1 drop BOTH EYES BID 10/01/18 06/08/20





[Vahid 128]   


 


Spironolactone [Aldactone] 12.5 mg PO DAILY 18


 


Cranberry 450mg 450 mg PO DAILY 19


 


Metoprolol Tartrate [Lopressor] 100 mg PO BID 19


 


hydrALAZINE HCL [Apresoline] 100 mg PO TID 19


 


sitaGLIPtin PHOSPHATE [Januvia] 50 mg PO DAILY@1800 19


 


Cholecalciferol (Vitamin D3) 2,000 unit PO DAILY 19





[Vitamin D3]   


 


Ferrous Sulfate [Feosol] 325 mg PO DAILY 19


 


Fluticasone Nasal Spray [Flonase 1 spr EA NOSTRIL DAILY 19





Nasal Spray]   


 


Loratadine [Claritin] 10 mg PO DAILY 19


 


Acetaminophen [Tylenol Arthritis] 650 mg PO Q6H PRN 20


 


Insulin Glargine [Lantus] 25 unit SQ DAILY@1800 20


 


Insulin Lispro [humaLOG Kwikpen] 10 unit SQ AC-TID 20


 


Insulin Lispro [humaLOG Kwikpen] See Protocol SQ ACHS 20


 


Losartan Potassium 50 mg PO BID 20








                                  Previous Rx's











 Medication  Instructions  Recorded


 


Warfarin [Coumadin] 4 mg PO DAILY@1800  tab 19











                                    Allergies











Allergy/AdvReac Type Severity Reaction Status Date / Time


 


No Known Allergies Allergy   Verified 20 18:05














Review of Systems


ROS Statement: 


Those systems with pertinent positive or pertinent negative responses have been 

documented in the HPI.





ROS Other: All systems not noted in ROS Statement are negative.





Past Medical History


Past Medical History: Atrial Fibrillation, Diabetes Mellitus, Hyperlipidemia, 

Hypertension, Thyroid Disorder


Additional Past Medical History / Comment(s): peripheral neuropathy, UTIs, 

urinary incontinence, OA bilateral hands and back, PVD, cervical cancer


History of Any Multi-Drug Resistant Organisms: ESBL, MRSA


Date of last positivie culture/infection: 18-MRSA; 17 ESBL-E.coli


MDRO Source:: Right Foot-MRSA; ESBL Urine


Past Surgical History: Hysterectomy, Tonsillectomy


Additional Past Surgical History / Comment(s): R leg bypass surgery, bilateral 

cataract removal


Past Anesthesia/Blood Transfusion Reactions: No Reported Reaction


Past Psychological History: No Psychological Hx Reported


Smoking Status: Never smoker


Past Alcohol Use History: None Reported


Past Drug Use History: None Reported





- Past Family History


  ** Mother


Family Medical History: CVA/TIA


Additional Family Medical History / Comment(s): Mother  in her 70's.





  ** Father


Family Medical History: Hypertension


Additional Family Medical History / Comment(s): Father  in his 70's.





General Exam





- General Exam Comments


Initial Comments: 











Constitutional: NAD, AOX3, Pt has pleasant affect. 


HEENT: NC/AT, trachea midline, neck supple, no lymphadenopathy. External ears 

appear normal, without discharge. Mucous membranes moist. EOM intact. There is 

no scleral icterus. No pallor noted. 


Cardiopulmonary: RRR, no murmurs, rubs or gallops, no JVD noted. Lungs CTAB in 

anterior and posterior fields. No peripheral edema. 


Abdominal exam: Abdomen soft and non-distended. Abdomen non-tender to palpation 

in all 4 quadrants. Bowel sounds active in LLQ. No hepatosplenomegaly. 


Neuro: CN II-XII grossly intact. No nuchal rigidity. No raccon eyes, no bowman 

sign, 


MSK: Right lower extremity amputation noted.  Extremities are warm.  Erythema 

and mild fluctuant mass with active draining noted distal right lower extremity.

  Mild amount of surrounding cellulitis.  No streaking.





Limitations: physical limitation





Course





                                   Vital Signs











  20





  15:45 16:31 17:32


 


Temperature 100.1 F H  


 


Pulse Rate 80 93 99


 


Respiratory 20 20 20





Rate   


 


Blood Pressure 152/91 147/79 149/66


 


O2 Sat by Pulse 97 97 96





Oximetry   














Medical Decision Making





- Medical Decision Making





85-year-old female patient with past history significant for right lower 

extremity above-knee amputation is sent from rehabilitation facility with 

concerns of infection.  Patient denies any acute complaints at this time.  Slade

es any other complaints.  Patient vital signs are stable, afebrile.  Physical 

exam displayed: Right lower extremity amputation noted.  Extremities are warm.  

Erythema and mild fluctuant mass with active draining noted distal right lower 

extremity.  Mild amount of surrounding cellulitis.  No streaking.  Patient is 

anticoagulated on Alquist.  Laboratory investigations were obtained.  White 

blood cell count of 11.7.  Left shift.  Hemoglobin 8.9 around baseline.  

Creatinine and BUN around baseline.  Glucose of 222.  Urine is positive for 

urinary tract infection.  Patient has a indwelling catheter this will be 

replaced.  Plain film of femur displayed a patient deformity no specific signs 

of osteomyelitis.  Patient had prior culture which grew Pseudomonas.  Patient 

initiated on vancomycin and Zosyn.  EKG displayed a defibrillation of which 

patient has history.  Will be admitted for further evaluation.  Case discussed 

with Dr. Lucas. 











- Lab Data


Result diagrams: 


                                 20 16:04





                                 20 16:04





                                   Lab Results











  20 Range/Units





  16:04 16:04 16:04 


 


WBC  11.7 H    (3.8-10.6)  k/uL


 


RBC  3.08 L    (3.80-5.40)  m/uL


 


Hgb  8.9 L    (11.4-16.0)  gm/dL


 


Hct  27.9 L    (34.0-46.0)  %


 


MCV  90.3    (80.0-100.0)  fL


 


MCH  28.8    (25.0-35.0)  pg


 


MCHC  31.9    (31.0-37.0)  g/dL


 


RDW  14.1    (11.5-15.5)  %


 


Plt Count  307    (150-450)  k/uL


 


Neutrophils %  77    %


 


Lymphocytes %  11    %


 


Monocytes %  8    %


 


Eosinophils %  2    %


 


Basophils %  1    %


 


Neutrophils #  9.0 H    (1.3-7.7)  k/uL


 


Lymphocytes #  1.3    (1.0-4.8)  k/uL


 


Monocytes #  0.9    (0-1.0)  k/uL


 


Eosinophils #  0.2    (0-0.7)  k/uL


 


Basophils #  0.1    (0-0.2)  k/uL


 


Hypochromasia  Slight    


 


PT   16.2 H   (9.0-12.0)  sec


 


INR   1.6 H   (<1.2)  


 


APTT   31.4 H   (22.0-30.0)  sec


 


Sodium    137  (137-145)  mmol/L


 


Potassium    4.9  (3.5-5.1)  mmol/L


 


Chloride    110 H  ()  mmol/L


 


Carbon Dioxide    17 L  (22-30)  mmol/L


 


Anion Gap    10  mmol/L


 


BUN    63 H  (7-17)  mg/dL


 


Creatinine    1.96 H  (0.52-1.04)  mg/dL


 


Est GFR (CKD-EPI)AfAm    26  (>60 ml/min/1.73 sqM)  


 


Est GFR (CKD-EPI)NonAf    23  (>60 ml/min/1.73 sqM)  


 


Glucose    222 H  (74-99)  mg/dL


 


Plasma Lactic Acid Amari     (0.7-2.0)  mmol/L


 


Calcium    9.4  (8.4-10.2)  mg/dL


 


Total Bilirubin    0.3  (0.2-1.3)  mg/dL


 


AST    19  (14-36)  U/L


 


ALT    15  (4-34)  U/L


 


Alkaline Phosphatase    69  ()  U/L


 


Total Protein    7.0  (6.3-8.2)  g/dL


 


Albumin    3.4 L  (3.5-5.0)  g/dL


 


Urine Color     


 


Urine Appearance     (Clear)  


 


Urine pH     (5.0-8.0)  


 


Ur Specific Gravity     (1.001-1.035)  


 


Urine Protein     (Negative)  


 


Urine Glucose (UA)     (Negative)  


 


Urine Ketones     (Negative)  


 


Urine Blood     (Negative)  


 


Urine Nitrite     (Negative)  


 


Urine Bilirubin     (Negative)  


 


Urine Urobilinogen     (<2.0)  mg/dL


 


Ur Leukocyte Esterase     (Negative)  


 


Urine RBC     (0-5)  /hpf


 


Urine WBC     (0-5)  /hpf


 


Urine WBC Clumps     (None)  /hpf


 


Urine Bacteria     (None)  /hpf


 


Urine Mucus     (None)  /hpf


 


Urine Yeast (Budding)     (None)  /hpf














  20 Range/Units





  16:04 17:30 


 


WBC    (3.8-10.6)  k/uL


 


RBC    (3.80-5.40)  m/uL


 


Hgb    (11.4-16.0)  gm/dL


 


Hct    (34.0-46.0)  %


 


MCV    (80.0-100.0)  fL


 


MCH    (25.0-35.0)  pg


 


MCHC    (31.0-37.0)  g/dL


 


RDW    (11.5-15.5)  %


 


Plt Count    (150-450)  k/uL


 


Neutrophils %    %


 


Lymphocytes %    %


 


Monocytes %    %


 


Eosinophils %    %


 


Basophils %    %


 


Neutrophils #    (1.3-7.7)  k/uL


 


Lymphocytes #    (1.0-4.8)  k/uL


 


Monocytes #    (0-1.0)  k/uL


 


Eosinophils #    (0-0.7)  k/uL


 


Basophils #    (0-0.2)  k/uL


 


Hypochromasia    


 


PT    (9.0-12.0)  sec


 


INR    (<1.2)  


 


APTT    (22.0-30.0)  sec


 


Sodium    (137-145)  mmol/L


 


Potassium    (3.5-5.1)  mmol/L


 


Chloride    ()  mmol/L


 


Carbon Dioxide    (22-30)  mmol/L


 


Anion Gap    mmol/L


 


BUN    (7-17)  mg/dL


 


Creatinine    (0.52-1.04)  mg/dL


 


Est GFR (CKD-EPI)AfAm    (>60 ml/min/1.73 sqM)  


 


Est GFR (CKD-EPI)NonAf    (>60 ml/min/1.73 sqM)  


 


Glucose    (74-99)  mg/dL


 


Plasma Lactic Acid Amari  1.2   (0.7-2.0)  mmol/L


 


Calcium    (8.4-10.2)  mg/dL


 


Total Bilirubin    (0.2-1.3)  mg/dL


 


AST    (14-36)  U/L


 


ALT    (4-34)  U/L


 


Alkaline Phosphatase    ()  U/L


 


Total Protein    (6.3-8.2)  g/dL


 


Albumin    (3.5-5.0)  g/dL


 


Urine Color   Yellow  


 


Urine Appearance   Turbid H  (Clear)  


 


Urine pH   7.0  (5.0-8.0)  


 


Ur Specific Gravity   1.015  (1.001-1.035)  


 


Urine Protein   1+ H  (Negative)  


 


Urine Glucose (UA)   Negative  (Negative)  


 


Urine Ketones   Negative  (Negative)  


 


Urine Blood   Moderate H  (Negative)  


 


Urine Nitrite   Positive H  (Negative)  


 


Urine Bilirubin   Negative  (Negative)  


 


Urine Urobilinogen   <2.0  (<2.0)  mg/dL


 


Ur Leukocyte Esterase   Large H  (Negative)  


 


Urine RBC   90 H  (0-5)  /hpf


 


Urine WBC   >182 H  (0-5)  /hpf


 


Urine WBC Clumps   Many H  (None)  /hpf


 


Urine Bacteria   Moderate H  (None)  /hpf


 


Urine Mucus   Rare H  (None)  /hpf


 


Urine Yeast (Budding)   Many H  (None)  /hpf














- EKG Data


-: EKG Interpreted by Me


EKG Comments: 


Ventricular rate 87, QRS 76, QT//466.  Atrial fibrillation.  No concern 

for acute ischemia.





Disposition


Clinical Impression: 


 Skin infection





Disposition: ADMITTED AS IP TO THIS HOSP


Condition: Serious


Is patient prescribed a controlled substance at d/c from ED?: No


Referrals: 


Azalea Pardo MD [Primary Care Provider] - 1-2 days

## 2020-06-08 NOTE — XR
EXAMINATION TYPE: XR femur RT

 

DATE OF EXAM: 6/8/2020

 

COMPARISON: None

 

HISTORY: Distal femur infection

 

TECHNIQUE: 2 views

 

FINDINGS: There is amputation at the mid shaft of the femur. There are some medial surgical clips. Th
ere is vascular calcification. There is mixed density at the stump in the soft tissues that could rel
ate to edema. I see no definite soft tissue air.

 

IMPRESSION: Amputation deformity. No specific sign of osteomyelitis. I have no recent comparison.

## 2020-06-09 LAB
GLUCOSE BLD-MCNC: 106 MG/DL (ref 75–99)
GLUCOSE BLD-MCNC: 145 MG/DL (ref 75–99)
GLUCOSE BLD-MCNC: 171 MG/DL (ref 75–99)
GLUCOSE BLD-MCNC: 68 MG/DL (ref 75–99)
GLUCOSE BLD-MCNC: 84 MG/DL (ref 75–99)
INR PPP: 1.9 (ref ?–1.2)
PT BLD: 18.6 SEC (ref 9–12)

## 2020-06-09 RX ADMIN — PIPERACILLIN AND TAZOBACTAM SCH MLS/HR: 3; .375 INJECTION, POWDER, FOR SOLUTION INTRAVENOUS at 17:31

## 2020-06-09 RX ADMIN — WARFARIN SODIUM SCH MG: 2 TABLET ORAL at 17:32

## 2020-06-09 RX ADMIN — CEFAZOLIN SCH MLS/HR: 330 INJECTION, POWDER, FOR SOLUTION INTRAMUSCULAR; INTRAVENOUS at 07:58

## 2020-06-09 RX ADMIN — THERA TABS SCH EACH: TAB at 17:31

## 2020-06-09 RX ADMIN — INSULIN ASPART SCH: 100 INJECTION, SOLUTION INTRAVENOUS; SUBCUTANEOUS at 12:20

## 2020-06-09 RX ADMIN — INSULIN ASPART SCH UNIT: 100 INJECTION, SOLUTION INTRAVENOUS; SUBCUTANEOUS at 20:45

## 2020-06-09 RX ADMIN — Medication SCH UNIT: at 07:59

## 2020-06-09 RX ADMIN — PRAVASTATIN SODIUM SCH MG: 80 TABLET ORAL at 17:31

## 2020-06-09 RX ADMIN — FLUTICASONE PROPIONATE SCH SPRAY: 50 SPRAY, METERED NASAL at 07:59

## 2020-06-09 RX ADMIN — SODIUM CHLORIDE SCH DROPS: 50 SOLUTION/ DROPS OPHTHALMIC at 08:00

## 2020-06-09 RX ADMIN — METOPROLOL TARTRATE SCH MG: 50 TABLET, FILM COATED ORAL at 07:58

## 2020-06-09 RX ADMIN — LORATADINE SCH MG: 10 TABLET ORAL at 07:59

## 2020-06-09 RX ADMIN — LOSARTAN POTASSIUM SCH MG: 50 TABLET, FILM COATED ORAL at 20:46

## 2020-06-09 RX ADMIN — SODIUM CHLORIDE SCH DROPS: 50 SOLUTION/ DROPS OPHTHALMIC at 20:46

## 2020-06-09 RX ADMIN — INSULIN DETEMIR SCH UNIT: 100 INJECTION, SOLUTION SUBCUTANEOUS at 17:50

## 2020-06-09 RX ADMIN — DOCUSATE SODIUM AND SENNOSIDES SCH EACH: 50; 8.6 TABLET ORAL at 07:59

## 2020-06-09 RX ADMIN — LOSARTAN POTASSIUM SCH MG: 50 TABLET, FILM COATED ORAL at 07:59

## 2020-06-09 RX ADMIN — INSULIN ASPART SCH: 100 INJECTION, SOLUTION INTRAVENOUS; SUBCUTANEOUS at 07:11

## 2020-06-09 RX ADMIN — LINAGLIPTIN SCH MG: 5 TABLET, FILM COATED ORAL at 17:31

## 2020-06-09 RX ADMIN — INSULIN ASPART SCH UNIT: 100 INJECTION, SOLUTION INTRAVENOUS; SUBCUTANEOUS at 17:32

## 2020-06-09 RX ADMIN — CEFAZOLIN SCH MLS/HR: 330 INJECTION, POWDER, FOR SOLUTION INTRAMUSCULAR; INTRAVENOUS at 20:46

## 2020-06-09 RX ADMIN — LEVOTHYROXINE SODIUM SCH MCG: 50 TABLET ORAL at 05:59

## 2020-06-09 RX ADMIN — PIPERACILLIN AND TAZOBACTAM SCH MLS/HR: 3; .375 INJECTION, POWDER, FOR SOLUTION INTRAVENOUS at 05:59

## 2020-06-09 RX ADMIN — Medication SCH MG: at 07:59

## 2020-06-09 RX ADMIN — METOPROLOL TARTRATE SCH MG: 50 TABLET, FILM COATED ORAL at 20:46

## 2020-06-09 RX ADMIN — ASPIRIN 81 MG CHEWABLE TABLET SCH MG: 81 TABLET CHEWABLE at 07:59

## 2020-06-09 NOTE — P.GSCN
History of Present Illness


Consult date: 20


Reason for Consult: 





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 Perez.


Requesting physician: Azalea Pardo


History of present illness: 





Patient is an 85-year-old female who resides at [facility].  she 

was brought into the emergency department for a wound to her right lower 

extremity at the site of her AKA.  The patient has a history of diabetes 

mellitus 2 on insulin, peripheral arterial disease, peripheral neuropathy, 

hypertension, hypothyroid, atrial fibrillation, chronic gangrene and ulcers to 

the right lower extremity.  She has had multiple bypasses in her right lower 

extremity.  The patient was following with vascular surgery regarding a right 

heel wound which she underwent a right above-the-knee amputation in 2019 

for infected gangrene of the right heel.  The patient states she had had no 

further complications with the site since that time.  Patient denies any fever 

or chills, she states that she believes the sores began from bumping it on her 

wheelchair with transferring.  She has a history of MRSA and E. coli infections.

history of the right femur amputation deformity.  No specific sign of 

osteomyelitis.  No definite soft tissue air.  she has been started on IV 

vancomycin and Zosyn.  Infectious disease has also been consulted.  cultures 

were collected and pending.





Review of Systems





A review of systems completed and all pertinent positives and negatives as 

stated in the HPI.





Past Medical History


Past Medical History: Atrial Fibrillation, Diabetes Mellitus, Hyperlipidemia, 

Hypertension, Thyroid Disorder


Additional Past Medical History / Comment(s): peripheral neuropathy, UTIs, 

urinary incontinence, OA bilateral hands and back, PVD, cervical cancer


History of Any Multi-Drug Resistant Organisms: ESBL, MRSA


Year Discovered:: 18-MRSA; 17 ESBL-E.coli


MDRO Source:: Right Foot-MRSA; ESBL Urine


Past Surgical History: Hysterectomy, Tonsillectomy


Additional Past Surgical History / Comment(s): R leg bypass surgery, bilateral 

cataract removal


Past Anesthesia/Blood Transfusion Reactions: No Reported Reaction


Past Psychological History: No Psychological Hx Reported


Additional Psychological History / Comment(s): Pt states she lives at [facility].  States uses a wheelchair to get around.


Smoking Status: Never smoker


Past Alcohol Use History: None Reported


Additional Past Alcohol Use History / Comment(s): Patient resides at [facility] and is wheelchair bound.


Past Drug Use History: None Reported





- Past Family History


  ** Mother


Family Medical History: CVA/TIA


Additional Family Medical History / Comment(s): Mother  in her 70's.





  ** Father


Family Medical History: Hypertension


Additional Family Medical History / Comment(s): Father  in his 70's.





Medications and Allergies


                                Home Medications











 Medication  Instructions  Recorded  Confirmed  Type


 


Aspirin 81 mg PO DAILY 16 History


 


Levothyroxine Sodium [Synthroid] 50 mcg PO QAM 16 History


 


Pravastatin Sodium [Pravachol] 80 mg PO DAILY@1800 16 History


 


Sennosides-Docusate Sodium 1 tab PO DAILY 17 History





[Senokot-S]    


 


Multivitamins, Thera [Multivitamin 1 tab PO DAILY@1800 10/01/18 06/08/20 History





(formulary)]    


 


Sodium Chloride 5% Ophth Soln 1 drop BOTH EYES BID 10/01/18 06/08/20 History





[Vahid 128]    


 


Spironolactone [Aldactone] 12.5 mg PO DAILY 18 History


 


Cranberry 450mg 450 mg PO DAILY 19 History


 


Metoprolol Tartrate [Lopressor] 100 mg PO BID 19 History


 


hydrALAZINE HCL [Apresoline] 100 mg PO TID 19 History


 


sitaGLIPtin PHOSPHATE [Januvia] 50 mg PO DAILY@1800 19 History


 


Cholecalciferol (Vitamin D3) 2,000 unit PO DAILY 19 History





[Vitamin D3]    


 


Ferrous Sulfate [Feosol] 325 mg PO DAILY 19 History


 


Fluticasone Nasal Spray [Flonase 1 spr EA NOSTRIL DAILY 19 

History





Nasal Spray]    


 


Loratadine [Claritin] 10 mg PO DAILY 19 History


 


Warfarin [Coumadin] 4 mg PO DAILY@1800  tab 19 Rx


 


Acetaminophen [Tylenol Arthritis] 650 mg PO Q6H PRN 20 History


 


Insulin Glargine [Lantus] 25 unit SQ DAILY@1800 20 History


 


Insulin Lispro [humaLOG Kwikpen] 10 unit SQ AC-TID 20 History


 


Insulin Lispro [humaLOG Kwikpen] See Protocol SQ ACHS 20 History


 


Losartan Potassium 50 mg PO BID 20 History








                                    Allergies











Allergy/AdvReac Type Severity Reaction Status Date / Time


 


No Known Allergies Allergy   Verified 20 18:05














Surgical - Exam


                                   Vital Signs











Temp Pulse Resp BP Pulse Ox


 


 100.1 F H  80   20   152/91   97 


 


 20 15:45  20 15:45  20 15:45  20 15:45  20 15:45














General appearance: The patient is alert, oriented, in no acute distress. Hard 

of hearing.


HET: Head is normocephalic and atraumatic.


Neck: Supple without lymphadenopathy.  Trachea midline.


Heart: S1 S2.  Regular rate and rhythm.


Lungs: No crackles or wheezes are heard.


Abdomen: Soft, nontender, nondistended with  bowel sounds.  No peritoneal signs.




Extremities: right lower extremity with above-the-knee amputation stump, with 

erythema and serosanguineous and purulent drainage with fluctuance, warm to the 

touch.  Left lower extremity with edema, palpable dorsalis pedis.


Neurological: No focal deficits.  Strength and sensation are grossly intact.





Results





femur x-ray reviewed showing amputation deformity.  No specific signs of 

osteomyelitis.





- Labs





                                 20 16:04





                                 20 16:04


                  Abnormal Lab Results - Last 24 Hours (Table)











  20 Range/Units





  16:04 16:04 16:04 


 


WBC  11.7 H    (3.8-10.6)  k/uL


 


RBC  3.08 L    (3.80-5.40)  m/uL


 


Hgb  8.9 L    (11.4-16.0)  gm/dL


 


Hct  27.9 L    (34.0-46.0)  %


 


Neutrophils #  9.0 H    (1.3-7.7)  k/uL


 


PT   16.2 H   (9.0-12.0)  sec


 


INR   1.6 H   (<1.2)  


 


APTT   31.4 H   (22.0-30.0)  sec


 


Chloride    110 H  ()  mmol/L


 


Carbon Dioxide    17 L  (22-30)  mmol/L


 


BUN    63 H  (7-17)  mg/dL


 


Creatinine    1.96 H  (0.52-1.04)  mg/dL


 


Glucose    222 H  (74-99)  mg/dL


 


POC Glucose (mg/dL)     (75-99)  mg/dL


 


Albumin    3.4 L  (3.5-5.0)  g/dL


 


Urine Appearance     (Clear)  


 


Urine Protein     (Negative)  


 


Urine Blood     (Negative)  


 


Urine Nitrite     (Negative)  


 


Ur Leukocyte Esterase     (Negative)  


 


Urine RBC     (0-5)  /hpf


 


Urine WBC     (0-5)  /hpf


 


Urine WBC Clumps     (None)  /hpf


 


Urine Bacteria     (None)  /hpf


 


Urine Mucus     (None)  /hpf


 


Urine Yeast (Budding)     (None)  /hpf














  20 Range/Units





  17:30 21:35 06:41 


 


WBC     (3.8-10.6)  k/uL


 


RBC     (3.80-5.40)  m/uL


 


Hgb     (11.4-16.0)  gm/dL


 


Hct     (34.0-46.0)  %


 


Neutrophils #     (1.3-7.7)  k/uL


 


PT     (9.0-12.0)  sec


 


INR     (<1.2)  


 


APTT     (22.0-30.0)  sec


 


Chloride     ()  mmol/L


 


Carbon Dioxide     (22-30)  mmol/L


 


BUN     (7-17)  mg/dL


 


Creatinine     (0.52-1.04)  mg/dL


 


Glucose     (74-99)  mg/dL


 


POC Glucose (mg/dL)   145 H  68 L  (75-99)  mg/dL


 


Albumin     (3.5-5.0)  g/dL


 


Urine Appearance  Turbid H    (Clear)  


 


Urine Protein  1+ H    (Negative)  


 


Urine Blood  Moderate H    (Negative)  


 


Urine Nitrite  Positive H    (Negative)  


 


Ur Leukocyte Esterase  Large H    (Negative)  


 


Urine RBC  90 H    (0-5)  /hpf


 


Urine WBC  >182 H    (0-5)  /hpf


 


Urine WBC Clumps  Many H    (None)  /hpf


 


Urine Bacteria  Moderate H    (None)  /hpf


 


Urine Mucus  Rare H    (None)  /hpf


 


Urine Yeast (Budding)  Many H    (None)  /hpf














  20 Range/Units





  07:14 


 


WBC   (3.8-10.6)  k/uL


 


RBC   (3.80-5.40)  m/uL


 


Hgb   (11.4-16.0)  gm/dL


 


Hct   (34.0-46.0)  %


 


Neutrophils #   (1.3-7.7)  k/uL


 


PT  18.6 H  (9.0-12.0)  sec


 


INR  1.9 H  (<1.2)  


 


APTT   (22.0-30.0)  sec


 


Chloride   ()  mmol/L


 


Carbon Dioxide   (22-30)  mmol/L


 


BUN   (7-17)  mg/dL


 


Creatinine   (0.52-1.04)  mg/dL


 


Glucose   (74-99)  mg/dL


 


POC Glucose (mg/dL)   (75-99)  mg/dL


 


Albumin   (3.5-5.0)  g/dL


 


Urine Appearance   (Clear)  


 


Urine Protein   (Negative)  


 


Urine Blood   (Negative)  


 


Urine Nitrite   (Negative)  


 


Ur Leukocyte Esterase   (Negative)  


 


Urine RBC   (0-5)  /hpf


 


Urine WBC   (0-5)  /hpf


 


Urine WBC Clumps   (None)  /hpf


 


Urine Bacteria   (None)  /hpf


 


Urine Mucus   (None)  /hpf


 


Urine Yeast (Budding)   (None)  /hpf








                      Microbiology - Last 24 Hours (Table)











 20 17:38 Gram Stain - Preliminary





 Thigh - Right Wound Culture - Preliminary


 


 20 17:30 Urine Culture - Preliminary





 Urine,Voided 








                                 Diabetes panel











  20 Range/Units





  16:04 


 


Sodium  137  (137-145)  mmol/L


 


Potassium  4.9  (3.5-5.1)  mmol/L


 


Chloride  110 H  ()  mmol/L


 


Carbon Dioxide  17 L  (22-30)  mmol/L


 


BUN  63 H  (7-17)  mg/dL


 


Creatinine  1.96 H  (0.52-1.04)  mg/dL


 


Glucose  222 H  (74-99)  mg/dL


 


Calcium  9.4  (8.4-10.2)  mg/dL


 


AST  19  (14-36)  U/L


 


ALT  15  (4-34)  U/L


 


Alkaline Phosphatase  69  ()  U/L


 


Total Protein  7.0  (6.3-8.2)  g/dL


 


Albumin  3.4 L  (3.5-5.0)  g/dL








                                  Calcium panel











  20 Range/Units





  16:04 


 


Calcium  9.4  (8.4-10.2)  mg/dL


 


Albumin  3.4 L  (3.5-5.0)  g/dL








                                 Pituitary panel











  20 Range/Units





  16:04 


 


Sodium  137  (137-145)  mmol/L


 


Potassium  4.9  (3.5-5.1)  mmol/L


 


Chloride  110 H  ()  mmol/L


 


Carbon Dioxide  17 L  (22-30)  mmol/L


 


BUN  63 H  (7-17)  mg/dL


 


Creatinine  1.96 H  (0.52-1.04)  mg/dL


 


Glucose  222 H  (74-99)  mg/dL


 


Calcium  9.4  (8.4-10.2)  mg/dL








                                  Adrenal panel











  20 Range/Units





  16:04 


 


Sodium  137  (137-145)  mmol/L


 


Potassium  4.9  (3.5-5.1)  mmol/L


 


Chloride  110 H  ()  mmol/L


 


Carbon Dioxide  17 L  (22-30)  mmol/L


 


BUN  63 H  (7-17)  mg/dL


 


Creatinine  1.96 H  (0.52-1.04)  mg/dL


 


Glucose  222 H  (74-99)  mg/dL


 


Calcium  9.4  (8.4-10.2)  mg/dL


 


Total Bilirubin  0.3  (0.2-1.3)  mg/dL


 


AST  19  (14-36)  U/L


 


ALT  15  (4-34)  U/L


 


Alkaline Phosphatase  69  ()  U/L


 


Total Protein  7.0  (6.3-8.2)  g/dL


 


Albumin  3.4 L  (3.5-5.0)  g/dL














Assessment and Plan


Assessment: 





1.  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


2.  Peripheral arterial disease, status post previous failed bypasses, status 

post right above-the-knee amputation for infected gangrene of the right heel


3.  Urinary tract infection with sepsis


4.  To kidney injury and chronic kidney disease stage III.


5.  Type 2 diabetes mellitus


6.  Hypertension


6.  Hyperlipidemia


Plan: 





She was started on IV antibiotics Zosyn and vancomycin.  Patient will be nothing

by mouth after midnight, patient will have I&D of the right lower extremity 

abcess at AKA site.  Obtain consent from her grandson who is named power of 

.  Cultures were obtained and pending.  Infectious disease on consult, 

appreciate their recommendations. Dr. Perez discussed with patient that during 

her procedure she will have to become a full code, after she will return to a no

code.  Patient states she understood and was agreeable.























thank you for this consultation and allowing us to take part in the plan of care

this patient during her hospital stay.











The above dictated assessment and findings were discussed with Dr. Perez.  The 

impression and plan of care have been directed as dictated.

## 2020-06-09 NOTE — P.CONS
History of Present Illness





- Reason for Consult


Consult date: 20


UTI and right AKA stump cellulitis


Requesting physician: Azalea Pardo





- Chief Complaint


Fever and right aka STUMP WOUND/redness





- History of Present Illness


Patient is 85-year  female with a past medical he significant for 

insulin-dependent diabetes mellitus and this patient also have a history of PAD 

and diabetic foot infection requiring right above-the-knee amputation in 2019 the patient has been doing well, however the last few days patient was 

noticed to have a swelling redness some superficial ulceration to the right 

above-the-knee amputated stump site and some purulent drainage the patient will 

also running a fever with the symptom the patient was advised to be admitted to 

the hospital on arrival to the ER patient did have a x-ray of the right femur 

with tissues amputation deformity but no signs of osteomyelitis patient on 

admitted to hospital have fever 100.1 degree for night patient did have white of

11.7 creatinine has been 1.96 urine has been positive with large leukocyte 

esterase is more than 90 WBC wound cultures are now showing gram-negative 

bacilli patient has been treated with a vancomycin and Zosyn infectious was 

consulted for further management of antibiotic therapy most information has been

obtained from her review the chart as the patient is currently not a very good 

historian.








Review of Systems


Positive point has been mentioned in HPI complete review could not be obtained 

because of  underlying mental status








Past Medical History


Past Medical History: Atrial Fibrillation, Diabetes Mellitus, Hyperlipidemia, 

Hypertension, Thyroid Disorder


Additional Past Medical History / Comment(s): peripheral neuropathy, UTIs, 

urinary incontinence, OA bilateral hands and back, PVD, cervical cancer


History of Any Multi-Drug Resistant Organisms: ESBL, MRSA


Year Discovered:: 18-MRSA; 17 ESBL-E.coli


MDRO Source:: Right Foot-MRSA; ESBL Urine


Past Surgical History: Hysterectomy, Tonsillectomy


Additional Past Surgical History / Comment(s): R leg bypass surgery, bilateral 

cataract removal


Past Anesthesia/Blood Transfusion Reactions: No Reported Reaction


Past Psychological History: No Psychological Hx Reported


Additional Psychological History / Comment(s): Pt states she lives at [facility].  States uses a wheelchair to get around.


Smoking Status: Never smoker


Past Alcohol Use History: None Reported


Additional Past Alcohol Use History / Comment(s): Patient resides at [facility] and is wheelchair bound.


Past Drug Use History: None Reported





- Past Family History


  ** Mother


Family Medical History: CVA/TIA


Additional Family Medical History / Comment(s): Mother  in her 70's.





  ** Father


Family Medical History: Hypertension


Additional Family Medical History / Comment(s): Father  in his 70's.





Medications and Allergies


                                Home Medications











 Medication  Instructions  Recorded  Confirmed  Type


 


Aspirin 81 mg PO DAILY 16 History


 


Levothyroxine Sodium [Synthroid] 50 mcg PO QAM 16 History


 


Pravastatin Sodium [Pravachol] 80 mg PO DAILY@1800 16 History


 


Sennosides-Docusate Sodium 1 tab PO DAILY 17 History





[Senokot-S]    


 


Multivitamins, Thera [Multivitamin 1 tab PO DAILY@1800 10/01/18 06/08/20 History





(formulary)]    


 


Sodium Chloride 5% Ophth Soln 1 drop BOTH EYES BID 10/01/18 06/08/20 History





[Vahid 128]    


 


Spironolactone [Aldactone] 12.5 mg PO DAILY 18 History


 


Cranberry 450mg 450 mg PO DAILY 19 History


 


Metoprolol Tartrate [Lopressor] 100 mg PO BID 19 History


 


hydrALAZINE HCL [Apresoline] 100 mg PO TID 19 History


 


sitaGLIPtin PHOSPHATE [Januvia] 50 mg PO DAILY@1800 19 History


 


Cholecalciferol (Vitamin D3) 2,000 unit PO DAILY 19 History





[Vitamin D3]    


 


Ferrous Sulfate [Feosol] 325 mg PO DAILY 19 History


 


Fluticasone Nasal Spray [Flonase 1 spr EA NOSTRIL DAILY 19 

History





Nasal Spray]    


 


Loratadine [Claritin] 10 mg PO DAILY 19 History


 


Warfarin [Coumadin] 4 mg PO DAILY@1800  tab 19 Rx


 


Acetaminophen [Tylenol Arthritis] 650 mg PO Q6H PRN 20 History


 


Insulin Glargine [Lantus] 25 unit SQ DAILY@1800 20 History


 


Insulin Lispro [humaLOG Kwikpen] 10 unit SQ AC-TID 20 History


 


Insulin Lispro [humaLOG Kwikpen] See Protocol SQ ACHS 20 History


 


Losartan Potassium 50 mg PO BID 20 History








                                    Allergies











Allergy/AdvReac Type Severity Reaction Status Date / Time


 


No Known Allergies Allergy   Verified 20 18:05














Physical Exam


Vitals: 


                                   Vital Signs











  Temp Pulse Pulse Resp BP BP Pulse Ox


 


 20 14:13  98.5 F   94  17   127/73  97


 


 20 06:54  98.5 F   80  16   156/71  99


 


 20 03:40     16   


 


 20 00:43  98.5 F   82  15   112/65  97


 


 20 21:55    79    136/68 


 


 20 19:59  97.9 F   78  13   156/79  98


 


 20 19:30  99.1 F  89   20  144/65   99


 


 20 18:29   88   20  144/63   99


 


 20 17:32   99   20  149/66   96


 


 20 16:31   93   20  147/79   97


 


 20 15:45  100.1 F H  80   20  152/91   97








                                Intake and Output











 20





 22:59 06:59 14:59


 


Intake Total 350  


 


Output Total  550 700


 


Balance 350 -550 -700


 


Intake:   


 


  Intake, IV Titration 350  





  Amount   


 


    Piperacillin-Tazobactam 3 100  





    .375 gm In Sodium   





    Chloride 0.9% 100 ml @ 25   





    mls/hr IVPB Q12H [facility] Rx#   





    :[id number]   


 


    Vancomycin 1,500 mg In 250  





    Sodium Chloride 0.9% 250   





    ml @ 125 mls/hr IVPB ONCE   





    ONE Rx#:[id number]   


 


Output:   


 


  Urine  550 700


 


Other:   


 


  Voiding Method Indwelling Catheter  Indwelling Catheter


 


  Weight 81.647 kg  81.647 kg











GENERAL DESCRIPTION: Elderly female lying in bed, no distress. No tachypnea or 

accessory muscle of respiration use.


HEENT: Shows Pallor , no scleral icterus. Oral mucous membrane is dry.


NECK: Trachea central, no thyromegaly.


LUNGS: Unlabored breathing. Clear to auscultation anteriorly. No wheeze or 

crackle.


HEART: S1, S2, regular rate and rhythm.


ABDOMEN: Soft, no tenderness , guarding or rigidity


EXTREMITIES: Right AKA stump did have superficial ulceration with some purulent 

drainage surrounding redness no foul-smelling.


SKIN: No rash, no masses palpable.


NEUROLOGICAL: The patient is awake, alert, oriented x1, mood and affect normal.











Results


CBC & Chem 7: 


                                 20 16:04





                                 20 16:04


Labs: 


                  Abnormal Lab Results - Last 24 Hours (Table)











  20 Range/Units





  16:04 16:04 16:04 


 


WBC  11.7 H    (3.8-10.6)  k/uL


 


RBC  3.08 L    (3.80-5.40)  m/uL


 


Hgb  8.9 L    (11.4-16.0)  gm/dL


 


Hct  27.9 L    (34.0-46.0)  %


 


Neutrophils #  9.0 H    (1.3-7.7)  k/uL


 


PT   16.2 H   (9.0-12.0)  sec


 


INR   1.6 H   (<1.2)  


 


APTT   31.4 H   (22.0-30.0)  sec


 


Chloride    110 H  ()  mmol/L


 


Carbon Dioxide    17 L  (22-30)  mmol/L


 


BUN    63 H  (7-17)  mg/dL


 


Creatinine    1.96 H  (0.52-1.04)  mg/dL


 


Glucose    222 H  (74-99)  mg/dL


 


POC Glucose (mg/dL)     (75-99)  mg/dL


 


Albumin    3.4 L  (3.5-5.0)  g/dL


 


Urine Appearance     (Clear)  


 


Urine Protein     (Negative)  


 


Urine Blood     (Negative)  


 


Urine Nitrite     (Negative)  


 


Ur Leukocyte Esterase     (Negative)  


 


Urine RBC     (0-5)  /hpf


 


Urine WBC     (0-5)  /hpf


 


Urine WBC Clumps     (None)  /hpf


 


Urine Bacteria     (None)  /hpf


 


Urine Mucus     (None)  /hpf


 


Urine Yeast (Budding)     (None)  /hpf














  20 Range/Units





  17:30 21:35 06:41 


 


WBC     (3.8-10.6)  k/uL


 


RBC     (3.80-5.40)  m/uL


 


Hgb     (11.4-16.0)  gm/dL


 


Hct     (34.0-46.0)  %


 


Neutrophils #     (1.3-7.7)  k/uL


 


PT     (9.0-12.0)  sec


 


INR     (<1.2)  


 


APTT     (22.0-30.0)  sec


 


Chloride     ()  mmol/L


 


Carbon Dioxide     (22-30)  mmol/L


 


BUN     (7-17)  mg/dL


 


Creatinine     (0.52-1.04)  mg/dL


 


Glucose     (74-99)  mg/dL


 


POC Glucose (mg/dL)   145 H  68 L  (75-99)  mg/dL


 


Albumin     (3.5-5.0)  g/dL


 


Urine Appearance  Turbid H    (Clear)  


 


Urine Protein  1+ H    (Negative)  


 


Urine Blood  Moderate H    (Negative)  


 


Urine Nitrite  Positive H    (Negative)  


 


Ur Leukocyte Esterase  Large H    (Negative)  


 


Urine RBC  90 H    (0-5)  /hpf


 


Urine WBC  >182 H    (0-5)  /hpf


 


Urine WBC Clumps  Many H    (None)  /hpf


 


Urine Bacteria  Moderate H    (None)  /hpf


 


Urine Mucus  Rare H    (None)  /hpf


 


Urine Yeast (Budding)  Many H    (None)  /hpf














  20 Range/Units





  07:14 11:41 


 


WBC    (3.8-10.6)  k/uL


 


RBC    (3.80-5.40)  m/uL


 


Hgb    (11.4-16.0)  gm/dL


 


Hct    (34.0-46.0)  %


 


Neutrophils #    (1.3-7.7)  k/uL


 


PT  18.6 H   (9.0-12.0)  sec


 


INR  1.9 H   (<1.2)  


 


APTT    (22.0-30.0)  sec


 


Chloride    ()  mmol/L


 


Carbon Dioxide    (22-30)  mmol/L


 


BUN    (7-17)  mg/dL


 


Creatinine    (0.52-1.04)  mg/dL


 


Glucose    (74-99)  mg/dL


 


POC Glucose (mg/dL)   106 H  (75-99)  mg/dL


 


Albumin    (3.5-5.0)  g/dL


 


Urine Appearance    (Clear)  


 


Urine Protein    (Negative)  


 


Urine Blood    (Negative)  


 


Urine Nitrite    (Negative)  


 


Ur Leukocyte Esterase    (Negative)  


 


Urine RBC    (0-5)  /hpf


 


Urine WBC    (0-5)  /hpf


 


Urine WBC Clumps    (None)  /hpf


 


Urine Bacteria    (None)  /hpf


 


Urine Mucus    (None)  /hpf


 


Urine Yeast (Budding)    (None)  /hpf








                      Microbiology - Last 24 Hours (Table)











 20 17:38 Gram Stain - Preliminary





 Thigh - Right Wound Culture - Preliminary


 


 20 17:30 Urine Culture - Preliminary





 Urine,Voided 














Assessment and Plan


Assessment: 


1-patient presented hospital with a fever and weakness in this patient have 

evidence of leukocytosis also likely multifactorial in this patient did have a 

component of urinary tract infection also with a right AKA stump wound infection

with secondary cellulitis with a wound culture culture showing gram-negative 

bacilli.


2-patient with borderline kidney function high risk of nephrotoxicity from 

vancomycin








(1) Cellulitis of right lower limb


Current Visit: No   Status: Acute   Code(s): L03.115 - CELLULITIS OF RIGHT LOWER

LIMB   SNOMED Code(s): 877241999


   





(2) UTI (urinary tract infection)


Current Visit: No   Status: Acute   Priority: High   Code(s): N39.0 - URINARY 

TRACT INFECTION, SITE NOT SPECIFIED   SNOMED Code(s): 40686695


   


Plan: 


1-Zosyn 3.375 g every 8 hour that should cover both UTI as well as the right AKA

stump cellulitis as a culture currently showing only gram-negative and gram-

positive


2-discontinue vancomycin decrease risk of nephrotoxicity


3-local wound care per vascular surgery


We will follow on clinical condition and cultures to further adjust medication 

if needed


Thank you for this consultation we will follow the patient along with you








Time with Patient: Greater than 30

## 2020-06-09 NOTE — P.HPIM
History of Present Illness


H&P Date: 20


Chief Complaint: Right stump cellulitis





This is an 85-year-old pleasant female followed by me at [facility]

known history of diabetes mellitus type 2 requiring insulin, with PAD and other 

complications peripheral neuropathy hypertension, hypothyroidism atrial fi

brillation, PAD, chronic gangrene/chronic ulcer diabetic foot ulcers right heel 

at least since 2019, admitted to [facility] back in 

2019 where she ended up going for right above the knee amputation and she 

has done marvelously well after surgery and she was sent back to the [facility] facility has been doing fine up until 2 days ago when she developed to have

a significant redness and increased drainage from the bottom of the right stump,

I received a call from the nursing staff stated that the patient has fever and 

chills her Perez catheter was cloudy and she was having some bloody pussy 

drainage from the stump he was recommended for the patient be transferred to the

emergency department at [facility] where she was seen and evaluated by 

an x-ray that did not show any evidence of possible myelitis however because of 

the presentation she was admitted to the hospital she was started on IV 

antibiotic in the form of Zosyn and vancomycin and blood cultures and the 

culture from the stump was obtained, infectious disease consultation was 

obtained as well as vascular surgery consultation, patient would be kept in the 

hospital until the final result of the culture is back in until after surgical 

intervention with I&D to the stump.





Review of Systems


Constitutional: Denies anorexia, Denies chronic headaches, Denies lethargy, 

Denies weakness, Denies weight loss


Eyes: denies blurred vision, denies bulging eye, denies decreased vision


Ears: bilateral: decreased hearing


Ears, nose, mouth and throat: Denies dysphagia, Denies neck lump, Denies sore 

throat


Cardiovascular: Reports leg edema, Denies chest pain, Denies decreased exercise 

tolerance, Denies rapid heart beat, Denies shortness of breath, Denies syncope


Respiratory: Denies congestion, Denies cough with sputum, Denies home oxygen, 

Denies sleep apnea, Denies snoring, Denies wheezing


Gastrointestinal: Denies abdominal pain, Denies bloating, Denies BRBPR, Denies 

excessive gas, Denies heartburn, Denies loss of appetite, Denies melena, Denies 

nausea, Denies vomiting


Genitourinary: Denies dysuria, Denies nocturia


Menstruation: Reports postmenopausal


Musculoskeletal: Reports gait dysfunction


Musculoskeletal: left: foot swelling, absent: hand pain, hand stiffness, hand 

swelling, hip pain, hip stiffness, hip swelling, shoulder pain, shoulder 

stiffness, shoulder swelling, wrist pain, wrist stiffness, wrist swelling


Integumentary: Denies pruritus, Denies rash


Neurological: Denies numbness, Denies weakness


Psychiatric: Denies anxiety, Denies depression


Endocrine: Denies fatigue, Denies weight change





Past Medical History


Past Medical History: Atrial Fibrillation, Diabetes Mellitus, Hyperlipidemia, 

Hypertension, Thyroid Disorder


Additional Past Medical History / Comment(s): peripheral neuropathy, UTIs, 

urinary incontinence, OA bilateral hands and back, PVD, cervical cancer


History of Any Multi-Drug Resistant Organisms: ESBL, MRSA


Date of last positivie culture/infection: 18-MRSA; 17 ESBL-E.coli


MDRO Source:: Right Foot-MRSA; ESBL Urine


Past Surgical History: Hysterectomy, Tonsillectomy


Additional Past Surgical History / Comment(s): R leg bypass surgery, bilateral 

cataract removal


Past Anesthesia/Blood Transfusion Reactions: No Reported Reaction


Past Psychological History: No Psychological Hx Reported


Additional Psychological History / Comment(s): Pt states she lives at [facility].  States uses a wheelchair to get around.


Smoking Status: Never smoker


Past Alcohol Use History: None Reported


Additional Past Alcohol Use History / Comment(s): Patient resides at [facility] and is wheelchair bound.


Past Drug Use History: None Reported





- Past Family History


  ** Mother


Family Medical History: CVA/TIA


Additional Family Medical History / Comment(s): Mother  in her 70's.





  ** Father


Family Medical History: Hypertension


Additional Family Medical History / Comment(s): Father  in his 70's.





Medications and Allergies


                                Home Medications











 Medication  Instructions  Recorded  Confirmed  Type


 


Aspirin 81 mg PO DAILY 16 History


 


Levothyroxine Sodium [Synthroid] 50 mcg PO QAM 16 History


 


Pravastatin Sodium [Pravachol] 80 mg PO DAILY@1800 16 History


 


Sennosides-Docusate Sodium 1 tab PO DAILY 17 History





[Senokot-S]    


 


Multivitamins, Thera [Multivitamin 1 tab PO DAILY@1800 10/01/18 06/08/20 History





(formulary)]    


 


Sodium Chloride 5% Ophth Soln 1 drop BOTH EYES BID 10/01/18 06/08/20 History





[Vahid 128]    


 


Spironolactone [Aldactone] 12.5 mg PO DAILY 18 History


 


Cranberry 450mg 450 mg PO DAILY 19 History


 


Metoprolol Tartrate [Lopressor] 100 mg PO BID 19 History


 


hydrALAZINE HCL [Apresoline] 100 mg PO TID 19 History


 


sitaGLIPtin PHOSPHATE [Januvia] 50 mg PO DAILY@1800 19 History


 


Cholecalciferol (Vitamin D3) 2,000 unit PO DAILY 19 History





[Vitamin D3]    


 


Ferrous Sulfate [Feosol] 325 mg PO DAILY 19 History


 


Fluticasone Nasal Spray [Flonase 1 spr EA NOSTRIL DAILY 19 

History





Nasal Spray]    


 


Loratadine [Claritin] 10 mg PO DAILY 19 History


 


Warfarin [Coumadin] 4 mg PO DAILY@1800  tab 19 Rx


 


Acetaminophen [Tylenol Arthritis] 650 mg PO Q6H PRN 20 History


 


Insulin Glargine [Lantus] 25 unit SQ DAILY@1800 20 History


 


Insulin Lispro [humaLOG Kwikpen] 10 unit SQ AC-TID 20 History


 


Insulin Lispro [humaLOG Kwikpen] See Protocol SQ ACHS 20 History


 


Losartan Potassium 50 mg PO BID 20 History








                                    Allergies











Allergy/AdvReac Type Severity Reaction Status Date / Time


 


No Known Allergies Allergy   Verified 20 18:05














Physical Exam


Vitals: 


                                   Vital Signs











  Temp Pulse Pulse Resp BP BP Pulse Ox


 


 20 06:54  98.5 F   80  16   156/71  99


 


 20 03:40     16   


 


 20 00:43  98.5 F   82  15   112/65  97


 


 20 21:55    79    136/68 


 


 20 19:59  97.9 F   78  13   156/79  98


 


 20 19:30  99.1 F  89   20  144/65   99


 


 20 18:29   88   20  144/63   99


 


 20 17:32   99   20  149/66   96


 


 20 16:31   93   20  147/79   97


 


 20 15:45  100.1 F H  80   20  152/91   97








                                Intake and Output











 20





 22:59 06:59 14:59


 


Intake Total 350  


 


Output Total  550 


 


Balance 350 -550 


 


Intake:   


 


  Intake, IV Titration 350  





  Amount   


 


    Piperacillin-Tazobactam 3 100  





    .375 gm In Sodium   





    Chloride 0.9% 100 ml @ 25   





    mls/hr IVPB Q12H [facility] Rx#   





    :[id number]   


 


    Vancomycin 1,500 mg In 250  





    Sodium Chloride 0.9% 250   





    ml @ 125 mls/hr IVPB ONCE   





    ONE Rx#:[id number]   


 


Output:   


 


  Urine  550 


 


Other:   


 


  Voiding Method Indwelling Catheter  


 


  Weight 81.647 kg  














HEENT: Head is atraumatic, normocephalic, pupils were equal round reactive to 

light and accommodations, extraocular muscle movement were intact.





Neck: Supple, no JVP, decreased carotid upstroke bilaterally.





Chest: Clear to auscultation bilaterally there is no crackles no wheezes no 

chest wall tenderness no intercostal retractions.





Heart: First heart sound is depressed, second heart sound is normal, irregularly

irregular, there is systolic ejection murmur 2/6 located in the left sternal 

border.





Abdomen: Soft, nontender, nondistended, positive bowel sounds.





Extremities: Right stump from the above knee amputation showed some redness and 

increase bloody and pus discharge from an open area, left lower extremity with 

edema no calf tenderness dorsalis pedis is +1 on the left side.





Neurologic examination: Patient is awake alert and oriented 3, creatinine 

nerves III-12 appear grossly intact, patient moves all her extremities.





Results


CBC & Chem 7: 


                                 20 16:04





                                 20 16:04


Labs: 


                  Abnormal Lab Results - Last 24 Hours (Table)











  20/20 Range/Units





  16:04 16:04 16:04 


 


WBC  11.7 H    (3.8-10.6)  k/uL


 


RBC  3.08 L    (3.80-5.40)  m/uL


 


Hgb  8.9 L    (11.4-16.0)  gm/dL


 


Hct  27.9 L    (34.0-46.0)  %


 


Neutrophils #  9.0 H    (1.3-7.7)  k/uL


 


PT   16.2 H   (9.0-12.0)  sec


 


INR   1.6 H   (<1.2)  


 


APTT   31.4 H   (22.0-30.0)  sec


 


Chloride    110 H  ()  mmol/L


 


Carbon Dioxide    17 L  (22-30)  mmol/L


 


BUN    63 H  (7-17)  mg/dL


 


Creatinine    1.96 H  (0.52-1.04)  mg/dL


 


Glucose    222 H  (74-99)  mg/dL


 


POC Glucose (mg/dL)     (75-99)  mg/dL


 


Albumin    3.4 L  (3.5-5.0)  g/dL


 


Urine Appearance     (Clear)  


 


Urine Protein     (Negative)  


 


Urine Blood     (Negative)  


 


Urine Nitrite     (Negative)  


 


Ur Leukocyte Esterase     (Negative)  


 


Urine RBC     (0-5)  /hpf


 


Urine WBC     (0-5)  /hpf


 


Urine WBC Clumps     (None)  /hpf


 


Urine Bacteria     (None)  /hpf


 


Urine Mucus     (None)  /hpf


 


Urine Yeast (Budding)     (None)  /hpf














  20 Range/Units





  17:30 21:35 06:41 


 


WBC     (3.8-10.6)  k/uL


 


RBC     (3.80-5.40)  m/uL


 


Hgb     (11.4-16.0)  gm/dL


 


Hct     (34.0-46.0)  %


 


Neutrophils #     (1.3-7.7)  k/uL


 


PT     (9.0-12.0)  sec


 


INR     (<1.2)  


 


APTT     (22.0-30.0)  sec


 


Chloride     ()  mmol/L


 


Carbon Dioxide     (22-30)  mmol/L


 


BUN     (7-17)  mg/dL


 


Creatinine     (0.52-1.04)  mg/dL


 


Glucose     (74-99)  mg/dL


 


POC Glucose (mg/dL)   145 H  68 L  (75-99)  mg/dL


 


Albumin     (3.5-5.0)  g/dL


 


Urine Appearance  Turbid H    (Clear)  


 


Urine Protein  1+ H    (Negative)  


 


Urine Blood  Moderate H    (Negative)  


 


Urine Nitrite  Positive H    (Negative)  


 


Ur Leukocyte Esterase  Large H    (Negative)  


 


Urine RBC  90 H    (0-5)  /hpf


 


Urine WBC  >182 H    (0-5)  /hpf


 


Urine WBC Clumps  Many H    (None)  /hpf


 


Urine Bacteria  Moderate H    (None)  /hpf


 


Urine Mucus  Rare H    (None)  /hpf


 


Urine Yeast (Budding)  Many H    (None)  /hpf














  20 Range/Units





  07:14 


 


WBC   (3.8-10.6)  k/uL


 


RBC   (3.80-5.40)  m/uL


 


Hgb   (11.4-16.0)  gm/dL


 


Hct   (34.0-46.0)  %


 


Neutrophils #   (1.3-7.7)  k/uL


 


PT  18.6 H  (9.0-12.0)  sec


 


INR  1.9 H  (<1.2)  


 


APTT   (22.0-30.0)  sec


 


Chloride   ()  mmol/L


 


Carbon Dioxide   (22-30)  mmol/L


 


BUN   (7-17)  mg/dL


 


Creatinine   (0.52-1.04)  mg/dL


 


Glucose   (74-99)  mg/dL


 


POC Glucose (mg/dL)   (75-99)  mg/dL


 


Albumin   (3.5-5.0)  g/dL


 


Urine Appearance   (Clear)  


 


Urine Protein   (Negative)  


 


Urine Blood   (Negative)  


 


Urine Nitrite   (Negative)  


 


Ur Leukocyte Esterase   (Negative)  


 


Urine RBC   (0-5)  /hpf


 


Urine WBC   (0-5)  /hpf


 


Urine WBC Clumps   (None)  /hpf


 


Urine Bacteria   (None)  /hpf


 


Urine Mucus   (None)  /hpf


 


Urine Yeast (Budding)   (None)  /hpf








                      Microbiology - Last 24 Hours (Table)











 20 17:38 Gram Stain - Preliminary





 Thigh - Right Wound Culture - Preliminary


 


 20 17:30 Urine Culture - Preliminary





 Urine,Voided 














Thrombosis Risk Factor Assmnt





- DVT/VTE Prophylaxis


DVT/VTE Prophylaxis: Pharmacologic Prophylaxis ordered, Mechanical Prophylaxis o

rdered





- Choose All That Apply


Any of the Below Risk Factors Present?: Yes


Each Factor Represents 1 point: Obesity (BMI >25)


Each Risk Factor Represents 3 Points: Age 75 years or older


Thrombosis Risk Factor Assessment Total Risk Factor Score: 4


Thrombosis Risk Factor Assessment Level: Moderate Risk





Assessment and Plan


Assessment: 





Assessment and plan:





1.  Right above knee amputation stump cellulitis with possible abscess.  Start 

the patient on IV antibiotic in the form of Zosyn and vancomycin pharmacy to 

dose the peak and trough, vascular surgery consultation for I&D, infectious 

disease consultation, obtain blood culture, obtain wound culture, we will 

monitor the patient very closely, patient the past did have a history of MRSA 

and Pseudomonas.





2.  Urinary tract infection with sepsis.  Continue IV antibiotic in the form of 

Zosyn, obtain urine culture.





3.  Acute kidney injury and top of chronic kidney disease stage III.  Start the 

patient on IV fluid in the form of normal saline at 50 mL an hour monitor the 

patient input and output and daily weight.  Monitor the patient CMP.





4.  Hypertension and hypertensive cardiovascular disease.  We will continue 

patient on metoprolol 100 mg orally twice every day, losartan 50 minute gram 

orally twice every day, hydralazine 100 mg orally 3 times every day.





5.  Hyperlipidemia.  Continue patient on pravastatin 80 mg at bedtime.





6.  Diabetes mellitus type 2 into the patient on Levemir 25 units at bedtime 

along with Humalog 10 units before each meal along with a sliding scale insulin,

continue consistent carbohydrate diet, continue with BGM before each meal and at

bedtime.





7.  Hypothyroidism.  Continue patient on Synthroid 50 g orally once every day.





8.  Proximal atrial fibrillation.  Continue patient on metoprolol 100 mg orally 

twice every day as well as Coumadin form gram at bedtime.  Monitor the patient 

PT and INR.





9.  ALLERGIC rhinitis.  Continue Claritin 10 mg orally once every day as well as

Flonase nasal spray 1 puff in each nostril twice every day.





10.  Severe peripheral arterial occlusive disease status post right above-knee 

amputation.  Continue aspirin and pravastatin for secondary prevention.





11.  Iron deficiency anemia.  Continue iron 325 mg orally once every day monitor

the patient hemoglobin.





12.  Vitamin D deficiency.  Continue patient on vitamin D supplement.





13.  DVT prophylaxis.  Currently on Coumadin keep her INR between 2-3.





14.  GI prophylaxis.  Protonix 40 mg orally once every day.





15.  Admit to inpatient.  Estimated length of stay 2 midnights.





16.  No code.





17.  Plan is to go back to [facility].

## 2020-06-10 LAB
ANION GAP SERPL CALC-SCNC: 6 MMOL/L
BASOPHILS # BLD AUTO: 0.1 K/UL (ref 0–0.2)
BASOPHILS NFR BLD AUTO: 1 %
BUN SERPL-SCNC: 54 MG/DL (ref 7–17)
CALCIUM SPEC-MCNC: 8.9 MG/DL (ref 8.4–10.2)
CHLORIDE SERPL-SCNC: 116 MMOL/L (ref 98–107)
CO2 SERPL-SCNC: 19 MMOL/L (ref 22–30)
EOSINOPHIL # BLD AUTO: 0.4 K/UL (ref 0–0.7)
EOSINOPHIL NFR BLD AUTO: 4 %
ERYTHROCYTE [DISTWIDTH] IN BLOOD BY AUTOMATED COUNT: 2.82 M/UL (ref 3.8–5.4)
ERYTHROCYTE [DISTWIDTH] IN BLOOD: 14.3 % (ref 11.5–15.5)
GLUCOSE BLD-MCNC: 114 MG/DL (ref 75–99)
GLUCOSE BLD-MCNC: 144 MG/DL (ref 75–99)
GLUCOSE BLD-MCNC: 199 MG/DL (ref 75–99)
GLUCOSE BLD-MCNC: 73 MG/DL (ref 75–99)
GLUCOSE SERPL-MCNC: 63 MG/DL (ref 74–99)
HCT VFR BLD AUTO: 26.6 % (ref 34–46)
HGB BLD-MCNC: 7.7 GM/DL (ref 11.4–16)
INR PPP: 2.1 (ref ?–1.2)
LYMPHOCYTES # SPEC AUTO: 1.4 K/UL (ref 1–4.8)
LYMPHOCYTES NFR SPEC AUTO: 15 %
MCH RBC QN AUTO: 27.2 PG (ref 25–35)
MCHC RBC AUTO-ENTMCNC: 28.8 G/DL (ref 31–37)
MCV RBC AUTO: 94.5 FL (ref 80–100)
MONOCYTES # BLD AUTO: 0.9 K/UL (ref 0–1)
MONOCYTES NFR BLD AUTO: 9 %
NEUTROPHILS # BLD AUTO: 6.4 K/UL (ref 1.3–7.7)
NEUTROPHILS NFR BLD AUTO: 69 %
PLATELET # BLD AUTO: 284 K/UL (ref 150–450)
POTASSIUM SERPL-SCNC: 4.4 MMOL/L (ref 3.5–5.1)
PT BLD: 20.7 SEC (ref 9–12)
SODIUM SERPL-SCNC: 141 MMOL/L (ref 137–145)
VANCOMYCIN SERPL-MCNC: 18.8 UG/ML
WBC # BLD AUTO: 9.2 K/UL (ref 3.8–10.6)

## 2020-06-10 RX ADMIN — INSULIN ASPART SCH: 100 INJECTION, SOLUTION INTRAVENOUS; SUBCUTANEOUS at 07:48

## 2020-06-10 RX ADMIN — INSULIN DETEMIR SCH UNIT: 100 INJECTION, SOLUTION SUBCUTANEOUS at 18:34

## 2020-06-10 RX ADMIN — INSULIN ASPART SCH UNIT: 100 INJECTION, SOLUTION INTRAVENOUS; SUBCUTANEOUS at 17:53

## 2020-06-10 RX ADMIN — ASPIRIN 81 MG CHEWABLE TABLET SCH MG: 81 TABLET CHEWABLE at 08:40

## 2020-06-10 RX ADMIN — CEFAZOLIN SCH: 330 INJECTION, POWDER, FOR SOLUTION INTRAMUSCULAR; INTRAVENOUS at 10:53

## 2020-06-10 RX ADMIN — DEXTROSE MONOHYDRATE AND SODIUM CHLORIDE SCH MLS/HR: 5; .9 INJECTION, SOLUTION INTRAVENOUS at 20:42

## 2020-06-10 RX ADMIN — LEVOTHYROXINE SODIUM SCH MCG: 50 TABLET ORAL at 05:38

## 2020-06-10 RX ADMIN — POTASSIUM CHLORIDE SCH: 14.9 INJECTION, SOLUTION INTRAVENOUS at 13:58

## 2020-06-10 RX ADMIN — INSULIN ASPART SCH UNIT: 100 INJECTION, SOLUTION INTRAVENOUS; SUBCUTANEOUS at 12:47

## 2020-06-10 RX ADMIN — LINAGLIPTIN SCH MG: 5 TABLET, FILM COATED ORAL at 17:19

## 2020-06-10 RX ADMIN — SODIUM CHLORIDE SCH DROPS: 50 SOLUTION/ DROPS OPHTHALMIC at 10:51

## 2020-06-10 RX ADMIN — METOPROLOL TARTRATE SCH MG: 50 TABLET, FILM COATED ORAL at 08:16

## 2020-06-10 RX ADMIN — PIPERACILLIN AND TAZOBACTAM SCH MLS/HR: 3; .375 INJECTION, POWDER, FOR SOLUTION INTRAVENOUS at 17:20

## 2020-06-10 RX ADMIN — PIPERACILLIN AND TAZOBACTAM SCH MLS/HR: 3; .375 INJECTION, POWDER, FOR SOLUTION INTRAVENOUS at 05:38

## 2020-06-10 RX ADMIN — LOSARTAN POTASSIUM SCH MG: 50 TABLET, FILM COATED ORAL at 20:43

## 2020-06-10 RX ADMIN — FLUTICASONE PROPIONATE SCH SPRAY: 50 SPRAY, METERED NASAL at 10:51

## 2020-06-10 RX ADMIN — DEXTROSE MONOHYDRATE AND SODIUM CHLORIDE SCH MLS/HR: 5; .9 INJECTION, SOLUTION INTRAVENOUS at 08:14

## 2020-06-10 RX ADMIN — LOSARTAN POTASSIUM SCH MG: 50 TABLET, FILM COATED ORAL at 08:16

## 2020-06-10 RX ADMIN — SODIUM CHLORIDE SCH DROPS: 50 SOLUTION/ DROPS OPHTHALMIC at 20:43

## 2020-06-10 RX ADMIN — METOPROLOL TARTRATE SCH MG: 50 TABLET, FILM COATED ORAL at 20:43

## 2020-06-10 RX ADMIN — Medication SCH UNIT: at 08:40

## 2020-06-10 RX ADMIN — LORATADINE SCH MG: 10 TABLET ORAL at 08:40

## 2020-06-10 RX ADMIN — INSULIN ASPART SCH: 100 INJECTION, SOLUTION INTRAVENOUS; SUBCUTANEOUS at 20:44

## 2020-06-10 RX ADMIN — INSULIN ASPART SCH: 100 INJECTION, SOLUTION INTRAVENOUS; SUBCUTANEOUS at 07:08

## 2020-06-10 RX ADMIN — Medication SCH MG: at 08:40

## 2020-06-10 RX ADMIN — DOCUSATE SODIUM AND SENNOSIDES SCH EACH: 50; 8.6 TABLET ORAL at 08:40

## 2020-06-10 RX ADMIN — THERA TABS SCH EACH: TAB at 17:19

## 2020-06-10 RX ADMIN — PRAVASTATIN SODIUM SCH MG: 80 TABLET ORAL at 17:19

## 2020-06-10 NOTE — P.PN
Subjective


Progress Note Date: 06/10/20





This is an 85-year-old pleasant female followed by me at [facility]

known history of diabetes mellitus type 2 requiring insulin, with PAD and other 

complications peripheral neuropathy hypertension, hypothyroidism atrial 

fibrillation, PAD, chronic gangrene/chronic ulcer diabetic foot ulcers right 

heel at least since March 2019, admitted to [facility] back in 

08/30/2019 where she ended up going for right above the knee amputation and she 

has done marvelously well after surgery and she was sent back to the [facility] 

care facility has been doing fine up until 2 days ago when she developed to have

a significant redness and increased drainage from the bottom of the right stump,

I received a call from the nursing staff stated that the patient has fever and 

chills her Perez catheter was cloudy and she was having some bloody pussy 

drainage from the stump he was recommended for the patient be transferred to the

emergency department at [facility] where she was seen and evaluated by 

an x-ray that did not show any evidence of possible myelitis however because of 

the presentation she was admitted to the hospital she was started on IV 

antibiotic in the form of Zosyn and vancomycin and blood cultures and the 

culture from the stump was obtained, infectious disease consultation was 

obtained as well as vascular surgery consultation, patient would be kept in the 

hospital until the final result of the culture is back in until after surgical 

intervention with I&D to the stump.





6/10: Patient sitting up in bed in no apparent distress she denies any chest 

pain, shortness breath, she has not slept well last night, she is scheduled to 

go for I&D of right stump due to abscess, she is maintained on IV anabiotic in 

the form of Zosyn and vancomycin was discontinued per infectious disease, blood 

cultures so far no growth urine culture did show gram-negative bacilli and the 

preliminary wound culture showed gram-positive cocci and gram-negative bacilli 

with a few polymorphonuclear leukocytes, patient will be switched to D5 normal 

saline at 75 mL an hour and we'll hold her diabetic medication as her blood 

glucose level in the morning was about 70, she will be taking her oral 

antihypertensive medications with a few sips of water.





Objective





- Vital Signs


Vital signs: 


                                   Vital Signs











Temp  97.9 F   06/10/20 00:33


 


Pulse  86   06/10/20 00:33


 


Resp  14   06/10/20 03:36


 


BP  124/70   06/10/20 00:33


 


Pulse Ox  98   06/10/20 00:33








                                 Intake & Output











 06/09/20 06/10/20 06/10/20





 18:59 06:59 18:59


 


Intake Total  1300 


 


Output Total 700 1100 


 


Balance -700 200 


 


Weight 81.647 kg  


 


Intake:   


 


  Intake, IV Titration  1300 





  Amount   


 


    Piperacillin-Tazobactam 3  100 





    .375 gm In Sodium   





    Chloride 0.9% 100 ml @ 25   





    mls/hr IVPB Q12H [facility] Rx#   





    :[id number]   


 


    Sodium Chloride 0.9% 1,  1200 





    000 ml @ 75 mls/hr IV .   





    [id number] [facility] Rx#:[id number]   


 


Output:   


 


  Urine 700 1100 


 


Other:   


 


  Voiding Method Indwelling Catheter  














- Exam

















Review of Systems


Constitutional: Denies anorexia, Denies chronic headaches, Denies lethargy, 

Denies weakness, Denies weight loss


Eyes: denies blurred vision, denies bulging eye, denies decreased vision


Ears: bilateral: decreased hearing


Ears, nose, mouth and throat: Denies dysphagia, Denies neck lump, Denies sore 

throat


Cardiovascular: Reports leg edema, Denies chest pain, Denies decreased exercise 

tolerance, Denies rapid heart beat, Denies shortness of breath, Denies syncope


Respiratory: Denies congestion, Denies cough with sputum, Denies home oxygen, 

Denies sleep apnea, Denies snoring, Denies wheezing


Gastrointestinal: Denies abdominal pain, Denies bloating, Denies BRBPR, Denies 

excessive gas, Denies heartburn, Denies loss of appetite, Denies melena, Denies 

nausea, Denies vomiting


Genitourinary: Denies dysuria, Denies nocturia


Menstruation: Reports postmenopausal


Musculoskeletal: Reports gait dysfunction


Musculoskeletal: left: foot swelling, absent: hand pain, hand stiffness, hand 

swelling, hip pain, hip stiffness, hip swelling, shoulder pain, shoulder stiffne

ss, shoulder swelling, wrist pain, wrist stiffness, wrist swelling


Integumentary: Denies pruritus, Denies rash


Neurological: Denies numbness, Denies weakness


Psychiatric: Denies anxiety, Denies depression


Endocrine: Denies fatigue, Denies weight change





















































Physical examination:








HEENT: Head is atraumatic, normocephalic, pupils were equal round reactive to 

light and accommodations, extraocular muscle movement were intact.





Neck: Supple, no JVP, decreased carotid upstroke bilaterally.





Chest: Clear to auscultation bilaterally there is no crackles no wheezes no 

chest wall tenderness no intercostal retractions.





Heart: First heart sound is depressed, second heart sound is normal, irregularly

irregular, there is systolic ejection murmur 2/6 located in the left sternal 

border.





Abdomen: Soft, nontender, nondistended, positive bowel sounds.





Extremities: Right stump from the above knee amputation showed some redness and 

increase bloody and pus discharge from an open area, left lower extremity with 

edema no calf tenderness dorsalis pedis is +1 on the left side.





Neurologic examination: Patient is awake alert and oriented 3, creatinine 

nerves III-12 appear grossly intact, patient moves all her extremities.








- Labs


CBC & Chem 7: 


                                 06/08/20 16:04





                                 06/08/20 16:04


Labs: 


                  Abnormal Lab Results - Last 24 Hours (Table)











  06/09/20 06/09/20 06/09/20 Range/Units





  11:41 16:35 20:21 


 


POC Glucose (mg/dL)  106 H  171 H  145 H  (75-99)  mg/dL














  06/10/20 Range/Units





  06:45 


 


POC Glucose (mg/dL)  73 L  (75-99)  mg/dL








                      Microbiology - Last 24 Hours (Table)











 06/08/20 17:30 Urine Culture - Preliminary





 Urine,Voided    Gram Neg Bacilli


 


 06/08/20 17:38 Gram Stain - Preliminary





 Thigh - Right Wound Culture - Preliminary





    Gram Neg Bacilli


 


 06/08/20 16:04 Blood Culture - Preliminary





 Blood    No Growth after 24 hours














Assessment and Plan


Assessment: 





Assessment and plan:





1.  Right above knee amputation stump cellulitis with possible abscess.  

Continue IV Zosyn 3.375 g IV piggyback every 8 hours, await the final result of 

the blood culture and wound culture, so far showed gram-positive cocci and gram-

negative bacilli , patient is scheduled to go for I&D of the right stump later 

on today.





2.  Urinary tract infection with sepsis.  Continue IV antibiotic in the form of 

Zosyn, urine culture showed gram-negative bacilli greater than 100,000 colonies.





3.  Acute kidney injury and top of chronic kidney disease stage III.  We will 

switch the patient to D5 normal saline at 75 mL an hour, monitor the patient BMP

today





4.  Hypertension and hypertensive cardiovascular disease.  We will continue 

patient on metoprolol 100 mg orally twice every day, losartan 50 mg orally twice

every day, hydralazine 100 mg orally 3 times every day.





5.  Hyperlipidemia.  Continue patient on pravastatin 80 mg at bedtime.





6.  Diabetes mellitus type 2 into the patient on Levemir 25 units at bedtime 

along with Humalog 10 units before each meal along with a sliding scale insulin,

continue consistent carbohydrate diet, continue with BGM before each meal and at

bedtime we will hold her diabetic medication just before surgery and resume 

after that.





7.  Hypothyroidism.  Continue patient on Synthroid 50 g orally once every day.





8.  Proximal atrial fibrillation.  Continue patient on metoprolol 100 mg orally 

twice every day as well as Coumadin, patient will have her PT and INR checked 

prior to her surgical intervention.





9.  ALLERGIC rhinitis.  Continue Claritin 10 mg orally once every day as well as

Flonase nasal spray 1 puff in each nostril twice every day.





10.  Severe peripheral arterial occlusive disease status post right above-knee 

amputation.  Continue aspirin and pravastatin for secondary prevention.





11.  Iron deficiency anemia.  Continue iron 325 mg orally once every day monitor

the patient hemoglobin.





12.  Vitamin D deficiency.  Continue patient on vitamin D supplement.





13.  DVT prophylaxis.  Currently on Coumadin keep her INR between 2-3.





14.  GI prophylaxis.  Protonix 40 mg orally once every day.





15.  Plan is to transfer the patient back to [facility].

## 2020-06-10 NOTE — P.PN
Subjective


Progress Note Date: 06/10/20





Patient was seen and examined at the bedside.  No acute changes overnight.  

Patient was scheduled to have incision and debridement of the right lower 

extremity amputation stump, however patient was given her dose of Coumadin.  INR

this morning was 2.1.  Vitamin K 10 mg ordered, patient to be rescheduled for 

I&D tomorrow with Dr. Perez.  Infectious disease remains on consult.  Patient 

denies any fever or chills, shortness breath or chest pain.





Objective





- Vital Signs


Vital signs: 


                                   Vital Signs











Temp  98 F   06/10/20 07:00


 


Pulse  86   06/10/20 07:00


 


Resp  16   06/10/20 07:00


 


BP  161/64   06/10/20 07:00


 


Pulse Ox  100   06/10/20 07:00








                                 Intake & Output











 06/09/20 06/10/20 06/10/20





 18:59 06:59 18:59


 


Intake Total  1300 


 


Output Total 700 1100 


 


Balance -700 200 


 


Weight 81.647 kg  


 


Intake:   


 


  Intake, IV Titration  1300 





  Amount   


 


    Piperacillin-Tazobactam 3  100 





    .375 gm In Sodium   





    Chloride 0.9% 100 ml @ 25   





    mls/hr IVPB Q12H URI Rx#   





    [id number]   


 


    Sodium Chloride 0.9% 1,  1200 





    000 ml @ 75 mls/hr IV .   





    [id number] URI Rx#:[id number]   


 


Output:   


 


  Urine 700 1100 


 


Other:   


 


  Voiding Method Indwelling Catheter  Indwelling Catheter














- Exam





General appearance: The patient is alert, oriented, in no acute distress.


HET: Head is normocephalic and atraumatic.  Pupils are equal and reactive.  

Oropharynx is clear without lesions.


Neck: Supple without lymphadenopathy.  Trachea midline.


Heart: S1 S2.  Regular rate and rhythm.


Lungs: No crackles or wheezes are heard.


Extremities: Right lower extremity with above-the-knee amputation, stump with 

erythema and serosanguineous drainage.  Dressing intact.


Neurological: No focal deficits.  Strength and sensation are grossly intact.





- Labs


CBC & Chem 7: 


                                 06/10/20 07:18





                                 06/10/20 07:18


Labs: 


                  Abnormal Lab Results - Last 24 Hours (Table)











  06/09/20 06/09/20 06/10/20 Range/Units





  16:35 20:21 06:45 


 


RBC     (3.80-5.40)  m/uL


 


Hgb     (11.4-16.0)  gm/dL


 


Hct     (34.0-46.0)  %


 


MCHC     (31.0-37.0)  g/dL


 


PT     (9.0-12.0)  sec


 


INR     (<1.2)  


 


Chloride     ()  mmol/L


 


Carbon Dioxide     (22-30)  mmol/L


 


BUN     (7-17)  mg/dL


 


Creatinine     (0.52-1.04)  mg/dL


 


Glucose     (74-99)  mg/dL


 


POC Glucose (mg/dL)  171 H  145 H  73 L  (75-99)  mg/dL














  06/10/20 06/10/20 06/10/20 Range/Units





  07:18 07:18 07:18 


 


RBC    2.82 L  (3.80-5.40)  m/uL


 


Hgb    7.7 L  (11.4-16.0)  gm/dL


 


Hct    26.6 L  (34.0-46.0)  %


 


MCHC    28.8 L  (31.0-37.0)  g/dL


 


PT  20.7 H    (9.0-12.0)  sec


 


INR  2.1 H    (<1.2)  


 


Chloride   116 H   ()  mmol/L


 


Carbon Dioxide   19 L   (22-30)  mmol/L


 


BUN   54 H   (7-17)  mg/dL


 


Creatinine   1.81 H   (0.52-1.04)  mg/dL


 


Glucose   63 L   (74-99)  mg/dL


 


POC Glucose (mg/dL)     (75-99)  mg/dL














  06/10/20 Range/Units





  12:00 


 


RBC   (3.80-5.40)  m/uL


 


Hgb   (11.4-16.0)  gm/dL


 


Hct   (34.0-46.0)  %


 


MCHC   (31.0-37.0)  g/dL


 


PT   (9.0-12.0)  sec


 


INR   (<1.2)  


 


Chloride   ()  mmol/L


 


Carbon Dioxide   (22-30)  mmol/L


 


BUN   (7-17)  mg/dL


 


Creatinine   (0.52-1.04)  mg/dL


 


Glucose   (74-99)  mg/dL


 


POC Glucose (mg/dL)  199 H  (75-99)  mg/dL








                      Microbiology - Last 24 Hours (Table)











 06/08/20 17:30 Urine Culture - Preliminary





 Urine,Voided    Gram Neg Bacilli


 


 06/08/20 17:38 Gram Stain - Preliminary





 Thigh - Right Wound Culture - Preliminary





    Gram Neg Bacilli


 


 06/08/20 16:04 Blood Culture - Preliminary





 Blood    No Growth after 24 hours














Assessment and Plan


Assessment: 





1.  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


2.  Peripheral arterial disease, status post previous failed bypasses, status 

post right above-the-knee amputation for infected gangrene of the right heel


3.  Urinary tract infection with sepsis


4.  To kidney injury and chronic kidney disease stage III.


5.  Type 2 diabetes mellitus


6.  Hypertension


6.  Hyperlipidemia


Plan: 





Patient to be given a dose of vitamin K 10 mg orally.  Repeat INR in the 

morning.  Patient is rescheduled for an incision and debridement of the right 

lower extremity for tomorrow with Dr. Perez.  Hold Coumadin.  Nothing by mouth 

after midnight.  Continue IV antibiotics per recommendations by infectious 

disease.  Wound cultures pending.




















The above dictated assessment and findings were discussed with Dr. Perez.  The 

impression and plan of care have been directed as dictated.

## 2020-06-10 NOTE — CDI
Documentation Clarification Form



Date: 06/10/2020 03:13:13 PM

From: Eve Kenyon RN, CCDS

Phone: [phone]

MRN: [id number]

Admit Date: 06/08/2020 05:54:00 PM

Patient Name: Dinah Field

Visit Number: [id number]



ATTENTION: The Clinical Documentation Specialists (CDI) and [facility] Coding Staff 
appreciate your assistance in clarifying documentation. Please respond to the 
clarification below the line at the bottom and electronically sign. The CDI & 
[facility] Coding staff will review the response and follow-up if needed. Please note: 
Queries are made part of the Legal Health Record. If you have any questions, 
please contact the author of this message via ITS.



Dr. Azalea Pardo



A diagnosis of UTI with sepsis has been documented in the H&P and progress notes
and requires further clarification as the patient presented to hospital with IDC
in place.



History/Risk Factors:

Per documentation in the H&P and Progress Notes this patient was admitted with 
an indwelling Perez catheter



Clinical Indicators:

6/9 H&P and 6/10 Progress note: "sent back to the [facility] care facility has 
been doing fine up until 2 days ago when she developed to have a significant 
redness and increased drainage from the bottom of the right stump, I received a 
call from the nursing staff stated that the patient has fever and chills her 
Perez catheter was cloudy and she was having some bloody pussy drainage from the
stump he was recommended for the patient be transferred to the emergency 
department at [facility] ."

6/8 EC Note: "Patient has an indwelling catheter this will be replaced."

Urinalysis: Turbid, +1 Protein, Moderate blood, +Nitrate, Large Leukocyte 
esterase, 90 RBC, >182 WBC, many WBC Clumps, moderate urine bacteria, rare urine
mucus, many yeast

Urine culture: gram neg Bacilli

Lab results: WBC 11.7/9.2, Neutrophils 9.0/6.4



Treatment: 

Iv Zosyn 3.375gm IVPB Q 12 hrs

IV Vanco 1,500 mg IVPB x 2 doses

IVF D5.45 @ @ 75 cc/hr



In your professional opinion, can you please clarify the etiology of the UTI, if
known?



UTI due to Perez Catheter POA

UTI not related to catheter

Other condition, please specify _________ 

Unable to determine



If an infective organism is present, please specify cause and effect 
relationship if applicable.



(Last Revision: April 2018)

___________________________________________________________________________

UTI due to Perez catheter, POA

MTDD

## 2020-06-11 LAB
ALBUMIN SERPL-MCNC: 2.8 G/DL (ref 3.5–5)
ALP SERPL-CCNC: 62 U/L (ref 38–126)
ALT SERPL-CCNC: 15 U/L (ref 4–34)
ANION GAP SERPL CALC-SCNC: 6 MMOL/L
AST SERPL-CCNC: 19 U/L (ref 14–36)
BASOPHILS # BLD AUTO: 0.1 K/UL (ref 0–0.2)
BASOPHILS NFR BLD AUTO: 1 %
BUN SERPL-SCNC: 44 MG/DL (ref 7–17)
CALCIUM SPEC-MCNC: 8.6 MG/DL (ref 8.4–10.2)
CHLORIDE SERPL-SCNC: 115 MMOL/L (ref 98–107)
CO2 SERPL-SCNC: 19 MMOL/L (ref 22–30)
EOSINOPHIL # BLD AUTO: 0.4 K/UL (ref 0–0.7)
EOSINOPHIL NFR BLD AUTO: 5 %
ERYTHROCYTE [DISTWIDTH] IN BLOOD BY AUTOMATED COUNT: 2.68 M/UL (ref 3.8–5.4)
ERYTHROCYTE [DISTWIDTH] IN BLOOD: 14 % (ref 11.5–15.5)
GLUCOSE BLD-MCNC: 110 MG/DL (ref 75–99)
GLUCOSE BLD-MCNC: 187 MG/DL (ref 75–99)
GLUCOSE BLD-MCNC: 86 MG/DL (ref 75–99)
GLUCOSE BLD-MCNC: 91 MG/DL (ref 75–99)
GLUCOSE SERPL-MCNC: 78 MG/DL (ref 74–99)
HCT VFR BLD AUTO: 24.9 % (ref 34–46)
HGB BLD-MCNC: 7.9 GM/DL (ref 11.4–16)
INR PPP: 1.3 (ref ?–1.2)
LYMPHOCYTES # SPEC AUTO: 1.4 K/UL (ref 1–4.8)
LYMPHOCYTES NFR SPEC AUTO: 17 %
MCH RBC QN AUTO: 29.3 PG (ref 25–35)
MCHC RBC AUTO-ENTMCNC: 31.6 G/DL (ref 31–37)
MCV RBC AUTO: 92.9 FL (ref 80–100)
MONOCYTES # BLD AUTO: 0.9 K/UL (ref 0–1)
MONOCYTES NFR BLD AUTO: 10 %
NEUTROPHILS # BLD AUTO: 5.3 K/UL (ref 1.3–7.7)
NEUTROPHILS NFR BLD AUTO: 65 %
PLATELET # BLD AUTO: 269 K/UL (ref 150–450)
POTASSIUM SERPL-SCNC: 4.3 MMOL/L (ref 3.5–5.1)
PROT SERPL-MCNC: 6.2 G/DL (ref 6.3–8.2)
PT BLD: 12.7 SEC (ref 9–12)
SODIUM SERPL-SCNC: 140 MMOL/L (ref 137–145)
WBC # BLD AUTO: 8.2 K/UL (ref 3.8–10.6)

## 2020-06-11 PROCEDURE — 0QBB0ZZ EXCISION OF RIGHT LOWER FEMUR, OPEN APPROACH: ICD-10-PCS

## 2020-06-11 RX ADMIN — LEVOTHYROXINE SODIUM SCH: 50 TABLET ORAL at 04:55

## 2020-06-11 RX ADMIN — INSULIN ASPART SCH: 100 INJECTION, SOLUTION INTRAVENOUS; SUBCUTANEOUS at 08:33

## 2020-06-11 RX ADMIN — LINAGLIPTIN SCH MG: 5 TABLET, FILM COATED ORAL at 18:07

## 2020-06-11 RX ADMIN — LOSARTAN POTASSIUM SCH MG: 50 TABLET, FILM COATED ORAL at 20:45

## 2020-06-11 RX ADMIN — POTASSIUM CHLORIDE SCH: 14.9 INJECTION, SOLUTION INTRAVENOUS at 15:10

## 2020-06-11 RX ADMIN — INSULIN DETEMIR SCH UNIT: 100 INJECTION, SOLUTION SUBCUTANEOUS at 18:07

## 2020-06-11 RX ADMIN — POTASSIUM CHLORIDE SCH: 14.9 INJECTION, SOLUTION INTRAVENOUS at 08:05

## 2020-06-11 RX ADMIN — PIPERACILLIN AND TAZOBACTAM SCH MLS/HR: 3; .375 INJECTION, POWDER, FOR SOLUTION INTRAVENOUS at 05:40

## 2020-06-11 RX ADMIN — Medication SCH MG: at 07:58

## 2020-06-11 RX ADMIN — FLUTICASONE PROPIONATE SCH SPRAY: 50 SPRAY, METERED NASAL at 07:59

## 2020-06-11 RX ADMIN — SODIUM CHLORIDE SCH DROPS: 50 SOLUTION/ DROPS OPHTHALMIC at 20:45

## 2020-06-11 RX ADMIN — INSULIN ASPART SCH UNIT: 100 INJECTION, SOLUTION INTRAVENOUS; SUBCUTANEOUS at 20:36

## 2020-06-11 RX ADMIN — CEFEPIME HYDROCHLORIDE SCH MLS/HR: 2 INJECTION, POWDER, FOR SOLUTION INTRAVENOUS at 20:37

## 2020-06-11 RX ADMIN — Medication SCH UNIT: at 07:58

## 2020-06-11 RX ADMIN — ASPIRIN 81 MG CHEWABLE TABLET SCH MG: 81 TABLET CHEWABLE at 07:58

## 2020-06-11 RX ADMIN — PRAVASTATIN SODIUM SCH MG: 80 TABLET ORAL at 18:07

## 2020-06-11 RX ADMIN — THERA TABS SCH EACH: TAB at 18:07

## 2020-06-11 RX ADMIN — LOSARTAN POTASSIUM SCH MG: 50 TABLET, FILM COATED ORAL at 07:57

## 2020-06-11 RX ADMIN — INSULIN ASPART SCH: 100 INJECTION, SOLUTION INTRAVENOUS; SUBCUTANEOUS at 12:44

## 2020-06-11 RX ADMIN — CEFAZOLIN SCH: 330 INJECTION, POWDER, FOR SOLUTION INTRAMUSCULAR; INTRAVENOUS at 04:54

## 2020-06-11 RX ADMIN — SODIUM CHLORIDE SCH DROPS: 50 SOLUTION/ DROPS OPHTHALMIC at 07:58

## 2020-06-11 RX ADMIN — DOCUSATE SODIUM AND SENNOSIDES SCH EACH: 50; 8.6 TABLET ORAL at 07:57

## 2020-06-11 RX ADMIN — POTASSIUM CHLORIDE SCH: 14.9 INJECTION, SOLUTION INTRAVENOUS at 04:54

## 2020-06-11 RX ADMIN — LORATADINE SCH MG: 10 TABLET ORAL at 07:58

## 2020-06-11 RX ADMIN — CEFAZOLIN SCH MLS/HR: 330 INJECTION, POWDER, FOR SOLUTION INTRAMUSCULAR; INTRAVENOUS at 13:48

## 2020-06-11 RX ADMIN — DEXTROSE MONOHYDRATE AND SODIUM CHLORIDE SCH MLS/HR: 5; .9 INJECTION, SOLUTION INTRAVENOUS at 05:44

## 2020-06-11 RX ADMIN — METOPROLOL TARTRATE SCH MG: 50 TABLET, FILM COATED ORAL at 07:58

## 2020-06-11 RX ADMIN — INSULIN ASPART SCH: 100 INJECTION, SOLUTION INTRAVENOUS; SUBCUTANEOUS at 17:57

## 2020-06-11 RX ADMIN — METOPROLOL TARTRATE SCH MG: 50 TABLET, FILM COATED ORAL at 20:45

## 2020-06-11 RX ADMIN — INSULIN ASPART SCH: 100 INJECTION, SOLUTION INTRAVENOUS; SUBCUTANEOUS at 12:45

## 2020-06-11 NOTE — PN
PROGRESS NOTE



DATE OF SERVICE:

06/10/2020



REASON FOR FOLLOWUP:

Right AKA stump wound infection and UTI.



INTERVAL HISTORY:

The patient is currently afebrile.  The patient is more awake and alert today. She is

breathing comfortably.  Denies having any chest pain or cough.  No abdominal pain.

Pain to the right AKA stump is currently controlled.



PHYSICAL EXAMINATION:

On examination, her blood pressure is 151/69 with a pulse of 85, temperature 98.4. She

is 97% on room air.

General description is an elderly female lying in bed in no distress.

RESPIRATORY SYSTEM: Unlabored breathing, clear to auscultation anteriorly.

HEART: S1, S2.  Regular rate and rhythm.

Right AKA stump wound is currently dressed up. No obvious drainage on the dressing.



LABS:

Hemoglobin  7.7, white count 9.2, BUN of 54, creatinine 1.81. Vancomycin random  was

18.8.



DIAGNOSTIC IMPRESSION AND PLAN:

Patient with right AKA stump wound infection with secondary cellulitis, culture showing

presumptive MRSA as well as gram-negative. Patient is covered with cefepime and

vancomycin to continue while watching her kidney function closely.  Continue supportive

care.





MMODL / IJN: [id number] / Job#: [id number]

## 2020-06-11 NOTE — P.PN
Subjective


Progress Note Date: 06/11/20





This is an 85-year-old pleasant female followed by me at [facility]

known history of diabetes mellitus type 2 requiring insulin, with PAD and other 

complications peripheral neuropathy hypertension, hypothyroidism atrial 

fibrillation, PAD, chronic gangrene/chronic ulcer diabetic foot ulcers right 

heel at least since March 2019, admitted to [facility] back in 

08/30/2019 where she ended up going for right above the knee amputation and she 

has done marvelously well after surgery and she was sent back to the [facility] 

care facility has been doing fine up until 2 days ago when she developed to have

a significant redness and increased drainage from the bottom of the right stump,

I received a call from the nursing staff stated that the patient has fever and 

chills her Perez catheter was cloudy and she was having some bloody pussy 

drainage from the stump he was recommended for the patient be transferred to the

emergency department at [facility] where she was seen and evaluated by 

an x-ray that did not show any evidence of possible myelitis however because of 

the presentation she was admitted to the hospital she was started on IV 

antibiotic in the form of Zosyn and vancomycin and blood cultures and the 

culture from the stump was obtained, infectious disease consultation was 

obtained as well as vascular surgery consultation, patient would be kept in the 

hospital until the final result of the culture is back in until after surgical 

intervention with I&D to the stump.





6/10: Patient sitting up in bed in no apparent distress she denies any chest 

pain, shortness breath, she has not slept well last night, she is scheduled to 

go for I&D of right stump due to abscess, she is maintained on IV anabiotic in 

the form of Zosyn and vancomycin was discontinued per infectious disease, blood 

cultures so far no growth urine culture did show gram-negative bacilli and the 

preliminary wound culture showed gram-positive cocci and gram-negative bacilli 

with a few polymorphonuclear leukocytes, patient will be switched to D5 normal 

saline at 75 mL an hour and we'll hold her diabetic medication as her blood 

glucose level in the morning was about 70, she will be taking her oral 

antihypertensive medications with a few sips of water.





6/11: Patient is sitting up in bed in no apparent distress she denies any chest 

pain, shortness breath, she has no abdominal pain, she has slept well last 

night, she is ready to go for her surgery today for I&D of the right stump ab

scess, she has not had any fever last night.





Objective





- Vital Signs


Vital signs: 


                                   Vital Signs











Temp  98.4 F   06/11/20 07:00


 


Pulse  84   06/11/20 07:00


 


Resp  18   06/11/20 07:00


 


BP  149/72   06/11/20 07:00


 


Pulse Ox  99   06/11/20 07:00








                                 Intake & Output











 06/10/20 06/11/20 06/11/20





 18:59 06:59 18:59


 


Intake Total 600 100 


 


Output Total 700 2000 


 


Balance -100 -1900 


 


Intake:   


 


    


 


    Dextrose 5%-0.9% NaCl 1, 600  





    000 ml @ 75 mls/hr IV .   





    [id number] [facility] Rx#:[id number]   


 


  Oral  100 


 


Output:   


 


  Urine 700 2000 


 


Other:   


 


  Voiding Method Indwelling Catheter Indwelling Catheter 














- Exam

















Review of Systems


Constitutional: Denies anorexia, Denies chronic headaches, Denies lethargy, 

Denies weakness, Denies weight loss


Eyes: denies blurred vision, denies bulging eye, denies decreased vision


Ears: bilateral: decreased hearing


Ears, nose, mouth and throat: Denies dysphagia, Denies neck lump, Denies sore 

throat


Cardiovascular: Reports leg edema, Denies chest pain, Denies decreased exercise 

tolerance, Denies rapid heart beat, Denies shortness of breath, Denies syncope


Respiratory: Denies congestion, Denies cough with sputum, Denies home oxygen, 

Denies sleep apnea, Denies snoring, Denies wheezing


Gastrointestinal: Denies abdominal pain, Denies bloating, Denies BRBPR, Denies 

excessive gas, Denies heartburn, Denies loss of appetite, Denies melena, Denies 

nausea, Denies vomiting


Genitourinary: Denies dysuria, Denies nocturia


Menstruation: Reports postmenopausal


Musculoskeletal: Reports gait dysfunction


Musculoskeletal: left: foot swelling, absent: hand pain, hand stiffness, hand 

swelling, hip pain, hip stiffness, hip swelling, shoulder pain, shoulder 

stiffness, shoulder swelling, wrist pain, wrist stiffness, wrist swelling


Integumentary: Denies pruritus, Denies rash


Neurological: Denies numbness, Denies weakness


Psychiatric: Denies anxiety, Denies depression


Endocrine: Denies fatigue, Denies weight change





















































Physical examination:








HEENT: Head is atraumatic, normocephalic, pupils were equal round reactive to 

light and accommodations, extraocular muscle movement were intact.





Neck: Supple, no JVP, decreased carotid upstroke bilaterally.





Chest: Clear to auscultation bilaterally there is no crackles no wheezes no 

chest wall tenderness no intercostal retractions.





Heart: First heart sound is depressed, second heart sound is normal, irregularly

irregular, there is systolic ejection murmur 2/6 located in the left sternal 

border.





Abdomen: Soft, nontender, nondistended, positive bowel sounds.





Extremities: Right stump from the above knee amputation showed some redness and 

increase bloody and pus discharge from an open area, left lower extremity with 

edema no calf tenderness dorsalis pedis is +1 on the left side.





Neurologic examination: Patient is awake alert and oriented 3, creatinine 

nerves III-12 appear grossly intact, patient moves all her extremities.








- Labs


CBC & Chem 7: 


                                 06/11/20 06:47





                                 06/11/20 06:47


Labs: 


                  Abnormal Lab Results - Last 24 Hours (Table)











  06/10/20 06/10/20 06/10/20 Range/Units





  07:18 12:00 17:12 


 


RBC  2.82 L    (3.80-5.40)  m/uL


 


Hgb  7.7 L    (11.4-16.0)  gm/dL


 


Hct  26.6 L    (34.0-46.0)  %


 


MCHC  28.8 L    (31.0-37.0)  g/dL


 


PT     (9.0-12.0)  sec


 


INR     (<1.2)  


 


Chloride     ()  mmol/L


 


Carbon Dioxide     (22-30)  mmol/L


 


BUN     (7-17)  mg/dL


 


Creatinine     (0.52-1.04)  mg/dL


 


POC Glucose (mg/dL)   199 H  144 H  (75-99)  mg/dL


 


Total Protein     (6.3-8.2)  g/dL


 


Albumin     (3.5-5.0)  g/dL














  06/10/20 06/11/20 06/11/20 Range/Units





  20:30 06:47 06:47 


 


RBC     (3.80-5.40)  m/uL


 


Hgb     (11.4-16.0)  gm/dL


 


Hct     (34.0-46.0)  %


 


MCHC     (31.0-37.0)  g/dL


 


PT   12.7 H   (9.0-12.0)  sec


 


INR   1.3 H   (<1.2)  


 


Chloride    115 H  ()  mmol/L


 


Carbon Dioxide    19 L  (22-30)  mmol/L


 


BUN    44 H  (7-17)  mg/dL


 


Creatinine    1.64 H  (0.52-1.04)  mg/dL


 


POC Glucose (mg/dL)  114 H    (75-99)  mg/dL


 


Total Protein    6.2 L  (6.3-8.2)  g/dL


 


Albumin    2.8 L  (3.5-5.0)  g/dL














  06/11/20 Range/Units





  06:47 


 


RBC  2.68 L  (3.80-5.40)  m/uL


 


Hgb  7.9 L  (11.4-16.0)  gm/dL


 


Hct  24.9 L  (34.0-46.0)  %


 


MCHC   (31.0-37.0)  g/dL


 


PT   (9.0-12.0)  sec


 


INR   (<1.2)  


 


Chloride   ()  mmol/L


 


Carbon Dioxide   (22-30)  mmol/L


 


BUN   (7-17)  mg/dL


 


Creatinine   (0.52-1.04)  mg/dL


 


POC Glucose (mg/dL)   (75-99)  mg/dL


 


Total Protein   (6.3-8.2)  g/dL


 


Albumin   (3.5-5.0)  g/dL








                      Microbiology - Last 24 Hours (Table)











 06/08/20 17:38 Gram Stain - Preliminary





 Thigh - Right Wound Culture - Preliminary





    Proteus mirabilis





    Presumptive MRSA





    Gram Neg Bacilli


 


 06/08/20 16:04 Blood Culture - Preliminary





 Blood    No Growth after 48 hours














Assessment and Plan


Assessment: 





Assessment and plan:





1.  Right above knee amputation stump cellulitis with possible abscess.  

Continue IV Zosyn 3.375 g IV piggyback every 8 hours, await the final result of 

the blood culture and wound culture, so far showed presumptive MRSA and Proteus 

mirabilis  , patient is scheduled to go for I&D of the right stump later on 

today.





2.  Urinary tract infection with sepsis.  Continue IV antibiotic in the form of 

Zosyn, urine culture showed gram-negative bacilli greater than 100,000 colonies.





3.  Acute kidney injury and top of chronic kidney disease stage III.  We will 

switch the patient to D5 normal saline at 75 mL an hour, monitor the patient BMP

today





4.  Hypertension and hypertensive cardiovascular disease.  We will continue 

patient on metoprolol 100 mg orally twice every day, losartan 50 mg orally twice

every day, hydralazine 100 mg orally 3 times every day.





5.  Hyperlipidemia.  Continue patient on pravastatin 80 mg at bedtime.





6.  Diabetes mellitus type 2 into the patient on Levemir 25 units at bedtime 

along with Humalog 10 units before each meal along with a sliding scale insulin,

continue consistent carbohydrate diet, continue with BGM before each meal and at

bedtime we will hold her diabetic medication just before surgery and resume 

after that.





7.  Hypothyroidism.  Continue patient on Synthroid 50 g orally once every day.





8.  Proximal atrial fibrillation.  Continue patient on metoprolol 100 mg orally 

twice every day as well as Coumadin, patient will have her PT and INR checked 

prior to her surgical intervention.





9.  ALLERGIC rhinitis.  Continue Claritin 10 mg orally once every day as well as

Flonase nasal spray 1 puff in each nostril twice every day.





10.  Severe peripheral arterial occlusive disease status post right above-knee 

amputation.  Continue aspirin and pravastatin for secondary prevention.





11.  Iron deficiency anemia.  Continue iron 325 mg orally once every day monitor

the patient hemoglobin.





12.  Vitamin D deficiency.  Continue patient on vitamin D supplement.





13.  DVT prophylaxis.  Currently on Coumadin keep her INR between 2-3.





14.  GI prophylaxis.  Protonix 40 mg orally once every day.





15.  Plan is to transfer the patient back to [facility].

## 2020-06-11 NOTE — PN
PROGRESS NOTE



DATE OF SERVICE:

06/11/2020



REASON FOR FOLLOWUP:

Right AKA stump wound infection.



INTERVAL HISTORY:

The patient is currently afebrile.  The patient has been taken to the OR this afternoon

in this patient who is status post debridement of the right BK stump wound with no

significant purulence.  No culture has been obtained.  The patient denies having any

chest pain, shortness of breath or cough.  No nausea, vomiting or diarrhea.



PHYSICAL EXAMINATION:

Blood pressure 176/73, pulse of 83, temperature is 97.4, she is 100% on 3 L nasal

cannula.

General description is an elderly female, lying in bed in no distress.

RESPIRATORY SYSTEM: Unlabored breathing, clear to auscultation anteriorly.

HEART:  S1, S2.  Regular rate and rhythm.

ABDOMEN:  Soft, no tenderness.

Right AK stump is currently dressed up.  No obvious drainage on the dressing.



LABS:

Hemoglobin 7.1, white count 8.2, BUN of 44, creatinine 1.64.  Culture with presumptive

MRSA and gram-negative bacilli.



DIAGNOSTIC IMPRESSION AND PLAN:

Patient with right AKA stump wound infection, status post debridement.  Culture has

been positive for MRSA and gram-negative antibiotic will be adjusted to cefepime and

vancomycin and monitor clinical course closely.





MMODL / IJN: [id number] / Job#: [id number]

## 2020-06-11 NOTE — P.OP
Date of Procedure: 06/11/20


Description of Procedure: 


Preoperative diagnosis: Right lower extremity above-knee amputation site abscess


Postoperative diagnosis: Same, no john purulent drainage, bone wax rejection


Procedure: []Sharp incision and debridement of right above-knee amputation site 

wound final measurement 7 cm x 2 cm x 0.3 cm tunneling 5 cm 


Surgeon: Christy Perez D.O.


EBL: [20 mL]


IV fluids: [See anesthesia records]


Urine output: [See anesthesia records]


Drains: [None]


Complications: [None immediately apparent]


Condition: [Stable to recovery]


Operative indication and findings: [The patient is an 85-year-old female with a 

previous above-knee amputation on the right side for nonhealing wounds and 

peripheral arterial disease back in August 2019.  She has been utilizing a 

prosthetic and over the past few weeks has developed a wound of her right 

lateral portion of the amputation site.  She is on Coumadin and once her INR was

 felt to be adequate she was taken the operative suite for incision and 

drainage.]


Procedure in detail: [The patient was taken to the operative suite and placed in

 supine position.  The right lower extremity was prepped and draped in usual 

sterile fashion.  A preprocedure timeout was performed, all parties were in 

agreement.  The areas of denuded tissue were incised sharply with scalpel, upon 

unroofing there was found to be multiple areas of bone wax.  There was no john 

purulent drainage.  A culture of the tract from the bone was taken.  There is an

 copiously irrigated.  Hemostasis was perfected with electrocautery.  A 

wet-to-dry dressing was placed.  The resultant wound measured 7 x 2 x 0.3 cm.  

There was a portion the tunnel approximate 5 cm likely the tract for the bone 

wax was projected.  A dressing was placed.  The patient was allowed awaken from 

surgery and transferred to PACU in stable condition having tolerated her 

procedure well.]

## 2020-06-12 LAB
ALBUMIN SERPL-MCNC: 2.7 G/DL (ref 3.5–5)
ALP SERPL-CCNC: 62 U/L (ref 38–126)
ALT SERPL-CCNC: 15 U/L (ref 4–34)
ANION GAP SERPL CALC-SCNC: 8 MMOL/L
AST SERPL-CCNC: 19 U/L (ref 14–36)
BASOPHILS # BLD AUTO: 0.1 K/UL (ref 0–0.2)
BASOPHILS NFR BLD AUTO: 1 %
BUN SERPL-SCNC: 40 MG/DL (ref 7–17)
CALCIUM SPEC-MCNC: 8.8 MG/DL (ref 8.4–10.2)
CHLORIDE SERPL-SCNC: 115 MMOL/L (ref 98–107)
CO2 SERPL-SCNC: 17 MMOL/L (ref 22–30)
EOSINOPHIL # BLD AUTO: 0.3 K/UL (ref 0–0.7)
EOSINOPHIL NFR BLD AUTO: 4 %
ERYTHROCYTE [DISTWIDTH] IN BLOOD BY AUTOMATED COUNT: 2.72 M/UL (ref 3.8–5.4)
ERYTHROCYTE [DISTWIDTH] IN BLOOD: 14.5 % (ref 11.5–15.5)
GLUCOSE BLD-MCNC: 131 MG/DL (ref 75–99)
GLUCOSE BLD-MCNC: 172 MG/DL (ref 75–99)
GLUCOSE BLD-MCNC: 194 MG/DL (ref 75–99)
GLUCOSE BLD-MCNC: 67 MG/DL (ref 75–99)
GLUCOSE BLD-MCNC: 92 MG/DL (ref 75–99)
GLUCOSE SERPL-MCNC: 78 MG/DL (ref 74–99)
HCT VFR BLD AUTO: 25.5 % (ref 34–46)
HGB BLD-MCNC: 7.5 GM/DL (ref 11.4–16)
INR PPP: 1.1 (ref ?–1.2)
LYMPHOCYTES # SPEC AUTO: 1.2 K/UL (ref 1–4.8)
LYMPHOCYTES NFR SPEC AUTO: 15 %
MCH RBC QN AUTO: 27.7 PG (ref 25–35)
MCHC RBC AUTO-ENTMCNC: 29.5 G/DL (ref 31–37)
MCV RBC AUTO: 93.9 FL (ref 80–100)
MONOCYTES # BLD AUTO: 0.8 K/UL (ref 0–1)
MONOCYTES NFR BLD AUTO: 10 %
NEUTROPHILS # BLD AUTO: 5.7 K/UL (ref 1.3–7.7)
NEUTROPHILS NFR BLD AUTO: 69 %
PLATELET # BLD AUTO: 256 K/UL (ref 150–450)
POTASSIUM SERPL-SCNC: 4.5 MMOL/L (ref 3.5–5.1)
PROT SERPL-MCNC: 6.1 G/DL (ref 6.3–8.2)
PT BLD: 11.3 SEC (ref 9–12)
SODIUM SERPL-SCNC: 140 MMOL/L (ref 137–145)
WBC # BLD AUTO: 8.4 K/UL (ref 3.8–10.6)

## 2020-06-12 PROCEDURE — 02HV33Z INSERTION OF INFUSION DEVICE INTO SUPERIOR VENA CAVA, PERCUTANEOUS APPROACH: ICD-10-PCS

## 2020-06-12 RX ADMIN — Medication SCH UNIT: at 07:26

## 2020-06-12 RX ADMIN — INSULIN ASPART SCH UNIT: 100 INJECTION, SOLUTION INTRAVENOUS; SUBCUTANEOUS at 16:53

## 2020-06-12 RX ADMIN — FLUTICASONE PROPIONATE SCH SPRAY: 50 SPRAY, METERED NASAL at 07:25

## 2020-06-12 RX ADMIN — CEFEPIME HYDROCHLORIDE SCH MLS/HR: 2 INJECTION, POWDER, FOR SOLUTION INTRAVENOUS at 20:08

## 2020-06-12 RX ADMIN — THERA TABS SCH EACH: TAB at 16:54

## 2020-06-12 RX ADMIN — CEFAZOLIN SCH: 330 INJECTION, POWDER, FOR SOLUTION INTRAMUSCULAR; INTRAVENOUS at 03:38

## 2020-06-12 RX ADMIN — Medication SCH MG: at 07:26

## 2020-06-12 RX ADMIN — INSULIN ASPART SCH: 100 INJECTION, SOLUTION INTRAVENOUS; SUBCUTANEOUS at 11:32

## 2020-06-12 RX ADMIN — LORATADINE SCH MG: 10 TABLET ORAL at 07:27

## 2020-06-12 RX ADMIN — INSULIN ASPART SCH UNIT: 100 INJECTION, SOLUTION INTRAVENOUS; SUBCUTANEOUS at 07:24

## 2020-06-12 RX ADMIN — Medication SCH MG: at 20:11

## 2020-06-12 RX ADMIN — DEXTROSE MONOHYDRATE AND SODIUM CHLORIDE SCH: 5; .9 INJECTION, SOLUTION INTRAVENOUS at 00:06

## 2020-06-12 RX ADMIN — LINAGLIPTIN SCH MG: 5 TABLET, FILM COATED ORAL at 16:54

## 2020-06-12 RX ADMIN — METOPROLOL TARTRATE SCH MG: 50 TABLET, FILM COATED ORAL at 07:26

## 2020-06-12 RX ADMIN — ASPIRIN 81 MG CHEWABLE TABLET SCH MG: 81 TABLET CHEWABLE at 07:27

## 2020-06-12 RX ADMIN — POTASSIUM CHLORIDE SCH: 14.9 INJECTION, SOLUTION INTRAVENOUS at 14:52

## 2020-06-12 RX ADMIN — SODIUM CHLORIDE SCH DROPS: 50 SOLUTION/ DROPS OPHTHALMIC at 20:10

## 2020-06-12 RX ADMIN — LEVOTHYROXINE SODIUM SCH MCG: 50 TABLET ORAL at 07:27

## 2020-06-12 RX ADMIN — DOCUSATE SODIUM AND SENNOSIDES SCH EACH: 50; 8.6 TABLET ORAL at 07:26

## 2020-06-12 RX ADMIN — INSULIN ASPART SCH UNIT: 100 INJECTION, SOLUTION INTRAVENOUS; SUBCUTANEOUS at 07:25

## 2020-06-12 RX ADMIN — LOSARTAN POTASSIUM SCH MG: 50 TABLET, FILM COATED ORAL at 20:09

## 2020-06-12 RX ADMIN — DEXTROSE MONOHYDRATE AND SODIUM CHLORIDE SCH: 5; .9 INJECTION, SOLUTION INTRAVENOUS at 14:52

## 2020-06-12 RX ADMIN — POTASSIUM CHLORIDE SCH: 14.9 INJECTION, SOLUTION INTRAVENOUS at 03:39

## 2020-06-12 RX ADMIN — CEFAZOLIN SCH: 330 INJECTION, POWDER, FOR SOLUTION INTRAMUSCULAR; INTRAVENOUS at 16:58

## 2020-06-12 RX ADMIN — PRAVASTATIN SODIUM SCH MG: 80 TABLET ORAL at 16:54

## 2020-06-12 RX ADMIN — INSULIN ASPART SCH UNIT: 100 INJECTION, SOLUTION INTRAVENOUS; SUBCUTANEOUS at 20:09

## 2020-06-12 RX ADMIN — METOPROLOL TARTRATE SCH MG: 50 TABLET, FILM COATED ORAL at 20:09

## 2020-06-12 RX ADMIN — INSULIN ASPART SCH UNIT: 100 INJECTION, SOLUTION INTRAVENOUS; SUBCUTANEOUS at 16:54

## 2020-06-12 RX ADMIN — SODIUM CHLORIDE SCH DROPS: 50 SOLUTION/ DROPS OPHTHALMIC at 07:25

## 2020-06-12 RX ADMIN — LOSARTAN POTASSIUM SCH MG: 50 TABLET, FILM COATED ORAL at 07:26

## 2020-06-12 RX ADMIN — INSULIN DETEMIR SCH UNIT: 100 INJECTION, SOLUTION SUBCUTANEOUS at 17:47

## 2020-06-12 NOTE — P.PN
Subjective


Progress Note Date: 06/12/20





This is an 85-year-old pleasant female followed by me at [facility]

known history of diabetes mellitus type 2 requiring insulin, with PAD and other 

complications peripheral neuropathy hypertension, hypothyroidism atrial 

fibrillation, PAD, chronic gangrene/chronic ulcer diabetic foot ulcers right 

heel at least since March 2019, admitted to [facility] back in 

08/30/2019 where she ended up going for right above the knee amputation and she 

has done marvelously well after surgery and she was sent back to the [facility] 

care facility has been doing fine up until 2 days ago when she developed to have

a significant redness and increased drainage from the bottom of the right stump,

I received a call from the nursing staff stated that the patient has fever and 

chills her Perez catheter was cloudy and she was having some bloody pussy 

drainage from the stump he was recommended for the patient be transferred to the

emergency department at [facility] where she was seen and evaluated by 

an x-ray that did not show any evidence of possible myelitis however because of 

the presentation she was admitted to the hospital she was started on IV 

antibiotic in the form of Zosyn and vancomycin and blood cultures and the 

culture from the stump was obtained, infectious disease consultation was 

obtained as well as vascular surgery consultation, patient would be kept in the 

hospital until the final result of the culture is back in until after surgical 

intervention with I&D to the stump.





6/10: Patient sitting up in bed in no apparent distress she denies any chest 

pain, shortness breath, she has not slept well last night, she is scheduled to 

go for I&D of right stump due to abscess, she is maintained on IV anabiotic in 

the form of Zosyn and vancomycin was discontinued per infectious disease, blood 

cultures so far no growth urine culture did show gram-negative bacilli and the 

preliminary wound culture showed gram-positive cocci and gram-negative bacilli 

with a few polymorphonuclear leukocytes, patient will be switched to D5 normal 

saline at 75 mL an hour and we'll hold her diabetic medication as her blood 

glucose level in the morning was about 70, she will be taking her oral 

antihypertensive medications with a few sips of water.





6/11: Patient is sitting up in bed in no apparent distress she denies any chest 

pain, shortness breath, she has no abdominal pain, she has slept well last 

night, she is ready to go for her surgery today for I&D of the right stump ab

scess, she has not had any fever last night.





6/12: Yesterday, patient underwent debridement of the right above-knee 

amputation site with Dr. Perez.  She has had no postprocedure complications.  

Cultures positive for MRSA, Proteus mirabilis and Pseudomonas, urine culture 

positive for Proteus mirabilis and Citrobacter werkmanii. Antibiotics were 

adjusted by Dr. Stovall to cefepime and vancomycin.  Patient has been afebrile, 

heart rate 89, blood pressure 141/68, pulse ox 97% on room air.  Repeat blood 

work revealed WBC 8.4, hemoglobin 7.5.  Platelet count 256.  Sodium 140, 

potassium 4.5, chloride 115, CO2 17, BUN 40 creatinine 1.57.  Blood sugars are 

running between 67 and 187.  INR is 1.1 and patient will be resumed back on 

Coumadin tonight.  Anticipate need for PICC line placement will be requested 

today while patient has subtherapeutic INR.  Anticipate discharge back to 

[facility] on Monday.





Objective





- Vital Signs


Vital signs: 


                                   Vital Signs











Temp  98.9 F   06/12/20 07:00


 


Pulse  89   06/12/20 07:00


 


Resp  16   06/12/20 07:00


 


BP  141/68   06/12/20 07:00


 


Pulse Ox  97   06/12/20 07:00








                                 Intake & Output











 06/11/20 06/12/20 06/12/20





 18:59 06:59 18:59


 


Intake Total 100 1100 


 


Output Total 320 950 


 


Balance -220 150 


 


Intake:   


 


   0 


 


    Dextrose 5%-0.9% NaCl 1,  0 





    000 ml @ 75 mls/hr IV .   





    [id number] URI Rx#:[id number]   


 


  Intake, IV Titration  1100 





  Amount   


 


    Cefepime 2 gm In Sodium  100 





    Chloride 0.9% 100 ml @   





    200 mls/hr IVPB Q24H URI   





    Rx#:[id number]   


 


    Sodium Chloride 0.9% 1,  1000 





    000 ml @ 75 mls/hr IV .   





    [id number] URI Rx#:[id number]   


 


Output:   


 


  Urine 300 950 


 


  Estimated Blood Loss 20  


 


Other:   


 


  Voiding Method Indwelling Catheter Indwelling Catheter 


 


  # Bowel Movements  0 














- Exam

















Review of Systems


Constitutional: Denies anorexia, Denies chronic headaches, Denies lethargy, 

Denies weakness, Denies weight loss


Eyes: denies blurred vision, denies bulging eye, denies decreased vision


Ears: bilateral: decreased hearing


Ears, nose, mouth and throat: Denies dysphagia, Denies neck lump, Denies sore 

throat


Cardiovascular: Reports leg edema, Denies chest pain, Denies decreased exercise 

tolerance, Denies rapid heart beat, Denies shortness of breath, Denies syncope


Respiratory: Denies congestion, Denies cough with sputum, Denies home oxygen, 

Denies sleep apnea, Denies snoring, Denies wheezing


Gastrointestinal: Denies abdominal pain, Denies bloating, Denies BRBPR, Denies 

excessive gas, Denies heartburn, Denies loss of appetite, Denies melena, Denies 

nausea, Denies vomiting


Genitourinary: Denies dysuria, Denies nocturia


Menstruation: Reports postmenopausal


Musculoskeletal: Reports gait dysfunction


Musculoskeletal: left: foot swelling, absent: hand pain, hand stiffness, hand 

swelling, hip pain, hip stiffness, hip swelling, shoulder pain, shoulder 

stiffness, shoulder swelling, wrist pain, wrist stiffness, wrist swelling


Integumentary: Denies pruritus, Denies rash


Neurological: Denies numbness, Denies weakness


Psychiatric: Denies anxiety, Denies depression


Endocrine: Denies fatigue, Denies weight change





















































Physical examination:








HEENT: Head is atraumatic, normocephalic, pupils were equal round reactive to 

light and accommodations, extraocular muscle movement were intact.





Neck: Supple, no JVP, decreased carotid upstroke bilaterally.





Chest: Clear to auscultation bilaterally there is no crackles no wheezes no 

chest wall tenderness no intercostal retractions.





Heart: First heart sound is depressed, second heart sound is normal, irregularly

irregular, there is systolic ejection murmur 2/6 located in the left sternal 

border.





Abdomen: Soft, nontender, nondistended, positive bowel sounds.





Extremities: Right stump from the above knee amputation showed some redness and 

increase bloody and pus discharge from an open area, left lower extremity with 

edema no calf tenderness dorsalis pedis is +1 on the left side.





Neurologic examination: Patient is awake alert and oriented 3, creatinine 

nerves III-12 appear grossly intact, patient moves all her extremities.








- Labs


CBC & Chem 7: 


                                 06/13/20 07:02





                                 06/13/20 07:02


Labs: 


                  Abnormal Lab Results - Last 24 Hours (Table)











  06/11/20 06/11/20 06/11/20 Range/Units





  06:47 12:26 20:17 


 


PT  12.7 H    (9.0-12.0)  sec


 


INR  1.3 H    (<1.2)  


 


POC Glucose (mg/dL)   110 H  187 H  (75-99)  mg/dL














  06/12/20 Range/Units





  07:04 


 


PT   (9.0-12.0)  sec


 


INR   (<1.2)  


 


POC Glucose (mg/dL)  131 H  (75-99)  mg/dL








                      Microbiology - Last 24 Hours (Table)











 06/08/20 17:30 Urine Culture - Final





 Urine,Voided    Proteus mirabilis





    Citrobacter werkmanii


 


 06/11/20 12:10 Gram Stain - Preliminary





 Leg - Right Wound Culture - Preliminary


 


 06/08/20 17:38 Gram Stain - Final





 Thigh - Right Wound Culture - Final





    Proteus mirabilis





    Methicillin resist S. aureus





    Pseudomonas fluorescens/putida


 


 06/08/20 16:04 Blood Culture - Preliminary





 Blood    No Growth after 72 hours


 


 06/11/20 12:10 Anaerobic Culture - Preliminary





 Leg - Right 














Assessment and Plan


Assessment: 





Assessment and plan:





1.  Right above knee amputation stump cellulitis with possible abscess.  

Continue IV Zosyn 3.375 g IV piggyback every 8 hours, await the final result of 

the blood culture and wound culture, so far showed presumptive MRSA and Proteus 

mirabilis  , patient is status post I&D of the right stump.





2.  Urinary tract infection with sepsis.  Continue IV antibiotic in the form of 

Zosyn, urine culture showed gram-negative bacilli greater than 100,000 colonies.





3.  Acute kidney injury and top of chronic kidney disease stage III.  We will 

switch the patient to D5 normal saline at 75 mL an hour, monitor the patient BMP

today





4.  Hypertension and hypertensive cardiovascular disease.  We will continue 

patient on metoprolol 100 mg orally twice every day, losartan 50 mg orally twice

every day, hydralazine 100 mg orally 3 times every day.





5.  Hyperlipidemia.  Continue patient on pravastatin 80 mg at bedtime.





6.  Diabetes mellitus type 2 into the patient on Levemir 25 units at bedtime 

along with Humalog 10 units before each meal along with a sliding scale insulin,

continue consistent carbohydrate diet, continue with BGM before each meal and at

bedtime we will hold her diabetic medication just before surgery and resume 

after that.





7.  Hypothyroidism.  Continue patient on Synthroid 50 g orally once every day.





8.  Proximal atrial fibrillation.  Continue patient on metoprolol 100 mg orally 

twice every day as well as Coumadin, patient will have her PT and INR checked 

prior to her surgical intervention.





9.  ALLERGIC rhinitis.  Continue Claritin 10 mg orally once every day as well as

Flonase nasal spray 1 puff in each nostril twice every day.





10.  Severe peripheral arterial occlusive disease status post right above-knee 

amputation.  Continue aspirin and pravastatin for secondary prevention.





11.  Iron deficiency anemia.  Continue iron 325 mg orally once every day monitor

the patient hemoglobin.





12.  Vitamin D deficiency.  Continue patient on vitamin D supplement.





13.  DVT prophylaxis.  Currently on Coumadin keep her INR between 2-3.





14.  GI prophylaxis.  Protonix 40 mg orally once every day.





15.  Plan is to transfer the patient back to [facility].


Plan: 





1.  Right above knee amputation stump cellulitis with possible abscess.  

Continue IV cefepime 2 g IV piggyback every 24 hours, culture positive for MRSA 

and Proteus mirabilis and Pseudomonas fluorescens, patient is status post I&D of

the right stump.  Anticipate need for PICC line which will be requested today.  

Coumadin resumed.





2.  Urinary tract infection with sepsis.  Continue IV antibiotic in the form of 

cefepime, urine culture showed Proteus mirabilis and Citrobacter werkmanii.





3.  Acute kidney injury and top of chronic kidney disease stage III.  We will 

Hep-Lock IV.





4.  Hypertension and hypertensive cardiovascular disease.  We will continue 

patient on metoprolol 100 mg orally twice every day, losartan 50 mg orally twice

every day, hydralazine 100 mg orally 3 times every day.





5.  Hyperlipidemia.  Continue patient on pravastatin 80 mg at bedtime.





6.  Diabetes mellitus type 2 into the patient on Levemir 25 units at bedtime 

along with Humalog 10 units before each meal along with a sliding scale insulin,

continue consistent carbohydrate diet, continue with BGM before each meal and at

bedtime we will hold her diabetic medication just before surgery and resume 

after that.





7.  Hypothyroidism.  Continue patient on Synthroid 50 g orally once every day.





8.  Proximal atrial fibrillation.  Continue patient on metoprolol 100 mg orally 

twice every day as well as Coumadin will be resumed this evening she will be 

getting 4 milligram at night and will repeat another dose tomorrow night and 

they were going to 2 milligram every night every that.  Meanwhile we will bridge

with Lovenox.





9.  ALLERGIC rhinitis.  Continue Claritin 10 mg orally once every day as well as

Flonase nasal spray 1 puff in each nostril twice every day.





10.  Severe peripheral arterial occlusive disease status post right above-knee 

amputation.  Continue aspirin and pravastatin for secondary prevention.





11.  Iron deficiency anemia.  Continue iron 325 mg orally once every day monitor

the patient hemoglobin.





12.  Vitamin D deficiency.  Continue patient on vitamin D supplement.





13.  DVT prophylaxis.  Currently on Coumadin keep her INR between 2-3.





14.  GI prophylaxis.  Protonix 40 mg orally once every day.





15.  Plan is to transfer the patient back to [facility] on Monday.

## 2020-06-12 NOTE — P.NPCON
History of Present Illness





- Reason for Consult


acute renal failure, chronic renal failure





- History of Present Illness





Reason for consultation: Acute kidney injury on chronic kidney disease





History of present illness:


Patient is a 85-year-old female seen in renal consultation for acute kidney 

injury on chronic kidney disease.  Patient has chronic kidney disease stage III 

with baseline creatinine near 1.5 secondary to diabetic kidney disease.  

Creatinine was 1.96 on admission and is down to 1.57 today.  Patient has history

of right lower extremity above-the-knee amputation and was sent from rehab fa

cility due to concerns for infection.  She underwent incision and debridement of

the wound site on .  She is currently resting in bed.  She is receiving 

IV antibiotics.  Oral intake is good.  No vomiting or diarrhea.  No edema.  No 

chest pain or shortness of breath.  Blood pressure is fairly stable.  She has 

been voiding.  No hematuria.  She is scheduled to receive a PICC line today.  

Patient is quite hard of hearing.





Vital signs are stable.


General: The patient appeared well nourished and normally developed. 


HEENT: Head exam is unremarkable. Neck is without jugular venous distension.


LUNGS: Lungs are clear to auscultation and percussion. Breath sounds decreased.


HEART: Rate and Rhythm are regular. 


ABDOMEN: Soft, nontender.


EXTREMITITES: No edema.  Right AKA noted.  Wound site wrapped.  No drainage.





Past Medical History


Past Medical History: Atrial Fibrillation, Diabetes Mellitus, Hyperlipidemia, 

Hypertension, Thyroid Disorder


Additional Past Medical History / Comment(s): peripheral neuropathy, UTIs, 

urinary incontinence, OA bilateral hands and back, PVD, cervical cancer


History of Any Multi-Drug Resistant Organisms: ESBL, MRSA


Date of last positivie culture/infection: 20-MRSA; 17 ESBL-E.coli


MDRO Source:: THIGH-MRSA; ESBL Urine


Past Surgical History: Hysterectomy, Tonsillectomy


Additional Past Surgical History / Comment(s): R leg bypass surgery, bilateral 

cataract removal


Past Anesthesia/Blood Transfusion Reactions: No Reported Reaction


Past Psychological History: No Psychological Hx Reported


Additional Psychological History / Comment(s): Pt states she lives at [facility].  States uses a wheelchair to get around.


Smoking Status: Never smoker


Past Alcohol Use History: None Reported


Additional Past Alcohol Use History / Comment(s): Patient resides at [facility] and is wheelchair bound.


Past Drug Use History: None Reported





- Past Family History


  ** Mother


Family Medical History: CVA/TIA


Additional Family Medical History / Comment(s): Mother  in her 70's.





  ** Father


Family Medical History: Hypertension


Additional Family Medical History / Comment(s): Father  in his 70's.





Medications and Allergies


                                Home Medications











 Medication  Instructions  Recorded  Confirmed  Type


 


Aspirin 81 mg PO DAILY 16 History


 


Levothyroxine Sodium [Synthroid] 50 mcg PO QAM 16 History


 


Pravastatin Sodium [Pravachol] 80 mg PO DAILY@1800 16 History


 


Sennosides-Docusate Sodium 1 tab PO DAILY 17 History





[Senokot-S]    


 


Multivitamins, Thera [Multivitamin 1 tab PO DAILY@1800 10/01/18 06/08/20 History





(formulary)]    


 


Sodium Chloride 5% Ophth Soln 1 drop BOTH EYES BID 10/01/18 06/08/20 History





[Vahid 128]    


 


Spironolactone [Aldactone] 12.5 mg PO DAILY 18 History


 


Cranberry 450mg 450 mg PO DAILY 19 History


 


Metoprolol Tartrate [Lopressor] 100 mg PO BID 19 History


 


hydrALAZINE HCL [Apresoline] 100 mg PO TID 19 History


 


sitaGLIPtin PHOSPHATE [Januvia] 50 mg PO DAILY@1800 19 History


 


Cholecalciferol (Vitamin D3) 2,000 unit PO DAILY 19 History





[Vitamin D3]    


 


Ferrous Sulfate [Feosol] 325 mg PO DAILY 19 History


 


Fluticasone Nasal Spray [Flonase 1 spr EA NOSTRIL DAILY 19 

History





Nasal Spray]    


 


Loratadine [Claritin] 10 mg PO DAILY 19 History


 


Warfarin [Coumadin] 4 mg PO DAILY@1800  tab 19 Rx


 


Acetaminophen [Tylenol Arthritis] 650 mg PO Q6H PRN 20 History


 


Insulin Glargine [Lantus] 25 unit SQ DAILY@1800 20 History


 


Insulin Lispro [humaLOG Kwikpen] 10 unit SQ AC-TID 20 History


 


Insulin Lispro [humaLOG Kwikpen] See Protocol SQ ACHS 20 History


 


Losartan Potassium 50 mg PO BID 20 History








                                    Allergies











Allergy/AdvReac Type Severity Reaction Status Date / Time


 


No Known Allergies Allergy   Verified 20 18:05














Physical Exam


Vitals: 


                                   Vital Signs











  Temp Pulse Resp BP Pulse Ox


 


 20 14:20  99.4 F  80  18  165/69  100


 


 20 07:00  98.9 F  89  16  141/68  97


 


 20 01:17  99.0 F  113 H  16  143/76  100


 


 20 23:10     148/73 


 


 20 20:44   101 H   154/74 


 


 20 18:58  99.5 F  105 H  16  146/77  100


 


 20 15:15   72   122/80 


 


 20 15:00   72   169/80 


 


 20 14:45   83   157/88 


 


 20 14:30   91   159/80 








                                Intake and Output











 20





 22:59 06:59 14:59


 


Intake Total 700 400 


 


Output Total 450 500 625


 


Balance 250 -100 -625


 


Intake:   


 


  IV 0  


 


    Dextrose 5%-0.9% NaCl 1, 0  





    000 ml @ 75 mls/hr IV .   





    [id number] URI Rx#:[id number]   


 


  Intake, IV Titration 700 400 





  Amount   


 


    Cefepime 2 gm In Sodium 100  





    Chloride 0.9% 100 ml @   





    200 mls/hr IVPB Q24H URI   





    Rx#:[id number]   


 


    Sodium Chloride 0.9% 1, 600 400 





    000 ml @ 75 mls/hr IV .   





    [id number] URI Rx#:[id number]   


 


Output:   


 


  Urine 450 500 625


 


Other:   


 


  Voiding Method Indwelling Catheter  Indwelling Catheter


 


  # Bowel Movements  0 


 


  Weight   81.647 kg














Results





- Lab Results


                             Most recent lab results











Calcium  8.8 mg/dL (8.4-10.2)   20  07:09    














                                 20 07:09





                                 20 07:09





Assessment and Plan


Plan: 





Assessment:


1.  Acute kidney injury mostly prerenal secondary to infection.  Improved.


2.  Chronic kidney disease stage III with baseline creatinine near 1.5.  

Etiology is diabetic kidney disease.


3.  Right lower extremity above-the-knee amputation site abscess status post 

incision and debridement on .


4.  Hypertension with chronic kidney disease.


5.  Insulin-dependent diabetes mellitus.


6.  History of A. fib.


7.  Metabolic acidosis secondary to chronic kidney disease and IV fluids.





Plan:


Encouraged oral intake.


Add oral sodium bicarbonate.


Avoid nephrotoxins.


Maintain current antihypertensives, including Cozaar, as blood pressure is on 

the higher side.


Cleared for PICC line placement on the dominant arm.





Thank you for the consultation.  I will continue to follow the patient with you 

during her hospital stay.

## 2020-06-12 NOTE — P.PN
Subjective


Progress Note Date: 06/12/20





Patient seen and examined at the bedside.  She has status postop day #1 from a 

right lower extremity sharp excisional debridement of her right above the knee 

amputation site, which was found to have bone wax rejection.  Patient sitting up

in bed in no acute distress.  Patient denies any fever or chills.  Patient 

denies any pain, nursing staff said patient has not requested or needed any pain

medications through the night.  She continues with IV antibiotics per 

recommendations by infectious disease.





Objective





- Vital Signs


Vital signs: 


                                   Vital Signs











Temp  98.9 F   06/12/20 07:00


 


Pulse  89   06/12/20 07:00


 


Resp  16   06/12/20 07:00


 


BP  141/68   06/12/20 07:00


 


Pulse Ox  97   06/12/20 07:00








                                 Intake & Output











 06/11/20 06/12/20 06/12/20





 18:59 06:59 18:59


 


Intake Total 100 1100 


 


Output Total 320 950 


 


Balance -220 150 


 


Weight   81.647 kg


 


Intake:   


 


   0 


 


    Dextrose 5%-0.9% NaCl 1,  0 





    000 ml @ 75 mls/hr IV .   





    [id number] URI Rx#:[id number]   


 


  Intake, IV Titration  1100 





  Amount   


 


    Cefepime 2 gm In Sodium  100 





    Chloride 0.9% 100 ml @   





    200 mls/hr IVPB Q24H URI   





    Rx#:[id number]   


 


    Sodium Chloride 0.9% 1,  1000 





    000 ml @ 75 mls/hr IV .   





    [id number] URI Rx#:[id number]   


 


Output:   


 


  Urine 300 950 


 


  Estimated Blood Loss 20  


 


Other:   


 


  Voiding Method Indwelling Catheter Indwelling Catheter Indwelling Catheter


 


  # Bowel Movements  0 














- Exam





General appearance: The patient is alert, oriented, in no acute distress.


HET: Head is normocephalic and atraumatic.  Pupils are equal and reactive.  

Oropharynx is clear without lesions.


Neck: Supple without lymphadenopathy.  Trachea midline.


Heart: S1 S2.  Regular rate and rhythm.


Lungs: No crackles or wheezes are heard.


Extremities: Right lower extremity dressing changed, dressing with moderate 

amount of dried blood.  Dressing was removed, debridement site without any 

drainage, surrounding erythema improved.  Wet-to-dry dressing reapplied, with 

Ace wrap


Neurological: No focal deficits.  Strength and sensation are grossly intact.





- Labs


CBC & Chem 7: 


                                 06/12/20 07:09





                                 06/12/20 07:09


Labs: 


                  Abnormal Lab Results - Last 24 Hours (Table)











  06/11/20 06/11/20 06/12/20 Range/Units





  12:26 20:17 07:04 


 


RBC     (3.80-5.40)  m/uL


 


Hgb     (11.4-16.0)  gm/dL


 


Hct     (34.0-46.0)  %


 


MCHC     (31.0-37.0)  g/dL


 


Chloride     ()  mmol/L


 


Carbon Dioxide     (22-30)  mmol/L


 


BUN     (7-17)  mg/dL


 


Creatinine     (0.52-1.04)  mg/dL


 


POC Glucose (mg/dL)  110 H  187 H  131 H  (75-99)  mg/dL


 


C-Reactive Protein     (<10.0)  mg/L


 


Total Protein     (6.3-8.2)  g/dL


 


Albumin     (3.5-5.0)  g/dL














  06/12/20 06/12/20 06/12/20 Range/Units





  07:09 07:09 11:23 


 


RBC   2.72 L   (3.80-5.40)  m/uL


 


Hgb   7.5 L   (11.4-16.0)  gm/dL


 


Hct   25.5 L   (34.0-46.0)  %


 


MCHC   29.5 L   (31.0-37.0)  g/dL


 


Chloride  115 H    ()  mmol/L


 


Carbon Dioxide  17 L    (22-30)  mmol/L


 


BUN  40 H    (7-17)  mg/dL


 


Creatinine  1.57 H    (0.52-1.04)  mg/dL


 


POC Glucose (mg/dL)    67 L  (75-99)  mg/dL


 


C-Reactive Protein  29.8 H    (<10.0)  mg/L


 


Total Protein  6.1 L    (6.3-8.2)  g/dL


 


Albumin  2.7 L    (3.5-5.0)  g/dL








                      Microbiology - Last 24 Hours (Table)











 06/11/20 12:10 Gram Stain - Preliminary





 Leg - Right Wound Culture - Preliminary





    Gram Neg Bacilli


 


 06/08/20 17:30 Urine Culture - Final





 Urine,Voided    Proteus mirabilis





    Citrobacter werkmanii


 


 06/08/20 17:38 Gram Stain - Final





 Thigh - Right Wound Culture - Final





    Proteus mirabilis





    Methicillin resist S. aureus





    Pseudomonas fluorescens/putida


 


 06/08/20 16:04 Blood Culture - Preliminary





 Blood    No Growth after 72 hours


 


 06/11/20 12:10 Anaerobic Culture - Preliminary





 Leg - Right 














Assessment and Plan


Assessment: 





1.  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 

excisional debridement of bone wax rejection.


2.  Positive wound culture for MRSA and gram-negative bacilli


3.  Peripheral arterial disease, status post previous failed bypasses, status 

post right above-the-knee amputation for infected gangrene of the right heel


4.  Urinary tract infection with sepsis


5.  To kidney injury and chronic kidney disease stage III.


6.  Type 2 diabetes mellitus


7.  Hypertension


9.  Hyperlipidemia


Plan: 





Continue with IV antibiotics per recommendation from infectious disease.  Daily 

wet-to-dry dressing changes.  Patient is to have a PICC line placed today for IV

antibiotics, plan is discharge Monday to [facility].  To follow-up with Dr. Perez 1-2 

weeks.

















The above dictated assessment and findings were discussed with Dr. Perez.  The 

impression and plan of care have been directed as dictated.

## 2020-06-12 NOTE — PN
PROGRESS NOTE



DATE OF SERVICE:

06/12/2020



REASON FOR FOLLOWUP:

Right AKA stump wound infection.



INTERVAL HISTORY:

The patient is currently afebrile. The patient is breathing comfortably. Overall pain

and discomfort to the right AKA stump is currently controlled.  No chest pain.  No

cough.  No abdominal pain or diarrhea.



PHYSICAL EXAMINATION:

Blood pressure 165/69 with a pulse of 80, temperature 99.4. She is 100% on room air.

General description is an elderly female lying in bed in no distress.

RESPIRATORY SYSTEM: Unlabored breathing. Clear to auscultation anteriorly.

HEART: S1, S2.  Regular rate and rhythm.

ABDOMEN: Soft. No tenderness.



LABS:

Hemoglobin 7.5, white count 8.4, creatinine 1.57. Blood cultures are showing Gram-

negative bacilli.



DIAGNOSTIC IMPRESSION AND PLAN:

Patient with right above-knee amputation stump wound infection.  Culture obtained shows

Pseudomonas, Proteus and MRSA. Patient is covered with vancomycin and cefepime; to

continue while waiting for the OR culture to be finalized.  She will get a PICC line

for outpatient IV antibiotic. Continue with supportive care.





MMODL / IJN: [id number] / Job#: [id number]

## 2020-06-12 NOTE — IR
EXAMINATION TYPE: IR cvc insert >=5 years

 

DATE OF EXAM: 6/12/2020

 

COMPARISON: NONE

 

CLINICAL HISTORY: Infection Needs long-term intravenous access for antibiotics.

 

PROCEDURE: Hand hygiene obtained with soap and water and alcohol-based hand rub.

After informed consent, the skin overlying the right brachial vein was localized with ultrasound and 
noted to be compressible and patent.  An ultrasound image was obtained and submitted on the patient's
 chart.  The overlying skin was prepped and draped and Lidocaine was used for local anesthesia.  A sk
in rhoda was made with a scalpel.  Access was gained to the vein under ultrasound guidance with a 21 g
auge needle and a 0.018 inch wire was advanced.  Access site was dilated with Peel-Away sheath and ca
theter tailored to the appropriate length and advanced such that the distal tip is at the cavoatrial 
junction.  Spot image was obtained verifying placement.  Catheter was fixed to the skin and a sterile
 dressing was placed following hemostasis.  Catheter was aspirated and flushed with saline.  Patient 
was discharged in stable condition without complication. Maximal barrier technique is utilized.  Ultr
asound image is documented on the chart. Ultrasound used with sterile technique. 

 

Fluoro time and fluoroscopic images submitted to document procedure: 34 intraoperative C-arm images d
ocument the procedure, 0.2 minutes fluoroscopy time

 

IMPRESSION: STATUS POST ULTRASOUND AND FLUOROSCOPIC GUIDED PICC LINE PLACEMENT, READY FOR USE.  THIS 
PROCEDURE WAS PERFORMED BY THE UNDERSIGNED.

## 2020-06-12 NOTE — CDI
Documentation Clarification Form



Date: 06/12/2020 10:56:29 AM

From: Eve Kenyon RN, CCDS

Phone: [phone]

MRN: [id number]

Admit Date: 06/08/2020 05:54:00 PM

Patient Name: Dinah Field

Visit Number: [id number]



ATTENTION: The Clinical Documentation Specialists (CDI) and [facility] Coding Staff 
appreciate your assistance in clarifying documentation. Please respond to the 
clarification below the line at the bottom and electronically sign. The CDI & 
[facility] Coding staff will review the response and follow-up if needed. Please note: 
Queries are made part of the Legal Health Record. If you have any questions, 
please contact the author of this message via ITS.



Dr. Christy Perez



Per your progress notes/operative note, an "Incisional Debridement" was 
preformed on 6/11 and requires further specificity.   



History/Risk Factors:

Cellulitis, DM, peripheral neuropathy, PVD



Clinical Indicators:

6/11 Procedure Note: "Right lower extremity above-knee amputation site abscess, 
no john purulent drainage, bone wax rejection."



Treatment: 

6/11 Procedure Note:"Sharp incision and debridement of right above-knee 
amputation site wound final measurement 7 cm x 2 cm x 0.3 cm tunneling 5 cm."



Five elements required for accurate and compliant documentation of a 
debridement:

1.   Technique used (e.g., excisional, excised, cutting, etc.)

2.   Instrument(s) used (e.g., scalpel, curette, etc.)

3.   Nature of the tissue removed (e.g., necrotic, devitalized tissues, non-
viable tissue, etc.)

4.   Appearance and size of the wound (e.g., down to fresh bleeding tissue, 7cm 
x 10cm, etc.)

5.   Depth of the debridement* (e.g., skin, subcutaneous tissue, fascia, muscle,
bone, etc.)

 

In order to capture the severity of condition and code the appropriate 
procedure; could you please document the following: 



Excisional debridement (the removal of necrotic, devitalized tissue or slough by
means of cutting away of tissue) 

 excisional



MTDD

## 2020-06-13 LAB
ALBUMIN SERPL-MCNC: 2.7 G/DL (ref 3.5–5)
ALP SERPL-CCNC: 63 U/L (ref 38–126)
ALT SERPL-CCNC: 13 U/L (ref 4–34)
ANION GAP SERPL CALC-SCNC: 4 MMOL/L
AST SERPL-CCNC: 14 U/L (ref 14–36)
BASOPHILS # BLD AUTO: 0.1 K/UL (ref 0–0.2)
BASOPHILS NFR BLD AUTO: 1 %
BUN SERPL-SCNC: 36 MG/DL (ref 7–17)
CALCIUM SPEC-MCNC: 8.7 MG/DL (ref 8.4–10.2)
CHLORIDE SERPL-SCNC: 114 MMOL/L (ref 98–107)
CO2 SERPL-SCNC: 21 MMOL/L (ref 22–30)
EOSINOPHIL # BLD AUTO: 0.4 K/UL (ref 0–0.7)
EOSINOPHIL NFR BLD AUTO: 5 %
ERYTHROCYTE [DISTWIDTH] IN BLOOD BY AUTOMATED COUNT: 2.72 M/UL (ref 3.8–5.4)
ERYTHROCYTE [DISTWIDTH] IN BLOOD: 14.1 % (ref 11.5–15.5)
GLUCOSE BLD-MCNC: 146 MG/DL (ref 75–99)
GLUCOSE BLD-MCNC: 175 MG/DL (ref 75–99)
GLUCOSE BLD-MCNC: 199 MG/DL (ref 75–99)
GLUCOSE BLD-MCNC: 75 MG/DL (ref 75–99)
GLUCOSE SERPL-MCNC: 64 MG/DL (ref 74–99)
HCT VFR BLD AUTO: 24.9 % (ref 34–46)
HGB BLD-MCNC: 7.9 GM/DL (ref 11.4–16)
INR PPP: 1.2 (ref ?–1.2)
LYMPHOCYTES # SPEC AUTO: 1.4 K/UL (ref 1–4.8)
LYMPHOCYTES NFR SPEC AUTO: 17 %
MCH RBC QN AUTO: 28.9 PG (ref 25–35)
MCHC RBC AUTO-ENTMCNC: 31.5 G/DL (ref 31–37)
MCV RBC AUTO: 91.6 FL (ref 80–100)
MONOCYTES # BLD AUTO: 0.8 K/UL (ref 0–1)
MONOCYTES NFR BLD AUTO: 9 %
NEUTROPHILS # BLD AUTO: 5.6 K/UL (ref 1.3–7.7)
NEUTROPHILS NFR BLD AUTO: 67 %
PLATELET # BLD AUTO: 228 K/UL (ref 150–450)
POTASSIUM SERPL-SCNC: 4 MMOL/L (ref 3.5–5.1)
PROT SERPL-MCNC: 6 G/DL (ref 6.3–8.2)
PT BLD: 11.9 SEC (ref 9–12)
SODIUM SERPL-SCNC: 139 MMOL/L (ref 137–145)
VANCOMYCIN SERPL-MCNC: 24.9 UG/ML
WBC # BLD AUTO: 8.4 K/UL (ref 3.8–10.6)

## 2020-06-13 RX ADMIN — INSULIN ASPART SCH: 100 INJECTION, SOLUTION INTRAVENOUS; SUBCUTANEOUS at 11:38

## 2020-06-13 RX ADMIN — SODIUM CHLORIDE SCH DROPS: 50 SOLUTION/ DROPS OPHTHALMIC at 08:17

## 2020-06-13 RX ADMIN — INSULIN ASPART SCH UNIT: 100 INJECTION, SOLUTION INTRAVENOUS; SUBCUTANEOUS at 21:33

## 2020-06-13 RX ADMIN — METOPROLOL TARTRATE SCH MG: 50 TABLET, FILM COATED ORAL at 21:34

## 2020-06-13 RX ADMIN — INSULIN ASPART SCH: 100 INJECTION, SOLUTION INTRAVENOUS; SUBCUTANEOUS at 07:39

## 2020-06-13 RX ADMIN — Medication SCH MG: at 21:34

## 2020-06-13 RX ADMIN — LOSARTAN POTASSIUM SCH MG: 50 TABLET, FILM COATED ORAL at 08:14

## 2020-06-13 RX ADMIN — WARFARIN SODIUM SCH MG: 2 TABLET ORAL at 17:29

## 2020-06-13 RX ADMIN — DEXTROSE MONOHYDRATE AND SODIUM CHLORIDE SCH: 5; .9 INJECTION, SOLUTION INTRAVENOUS at 11:20

## 2020-06-13 RX ADMIN — Medication SCH UNIT: at 08:14

## 2020-06-13 RX ADMIN — DEXTROSE MONOHYDRATE AND SODIUM CHLORIDE SCH: 5; .9 INJECTION, SOLUTION INTRAVENOUS at 04:14

## 2020-06-13 RX ADMIN — POTASSIUM CHLORIDE SCH: 14.9 INJECTION, SOLUTION INTRAVENOUS at 11:20

## 2020-06-13 RX ADMIN — ENOXAPARIN SODIUM SCH MG: 80 INJECTION SUBCUTANEOUS at 09:58

## 2020-06-13 RX ADMIN — FLUTICASONE PROPIONATE SCH SPRAY: 50 SPRAY, METERED NASAL at 08:17

## 2020-06-13 RX ADMIN — INSULIN ASPART SCH UNIT: 100 INJECTION, SOLUTION INTRAVENOUS; SUBCUTANEOUS at 12:26

## 2020-06-13 RX ADMIN — INSULIN ASPART SCH UNIT: 100 INJECTION, SOLUTION INTRAVENOUS; SUBCUTANEOUS at 17:29

## 2020-06-13 RX ADMIN — INSULIN ASPART SCH: 100 INJECTION, SOLUTION INTRAVENOUS; SUBCUTANEOUS at 08:09

## 2020-06-13 RX ADMIN — METOPROLOL TARTRATE SCH MG: 50 TABLET, FILM COATED ORAL at 08:14

## 2020-06-13 RX ADMIN — CEFEPIME HYDROCHLORIDE SCH MLS/HR: 2 INJECTION, POWDER, FOR SOLUTION INTRAVENOUS at 21:34

## 2020-06-13 RX ADMIN — ASPIRIN 81 MG CHEWABLE TABLET SCH MG: 81 TABLET CHEWABLE at 08:14

## 2020-06-13 RX ADMIN — PRAVASTATIN SODIUM SCH MG: 80 TABLET ORAL at 17:28

## 2020-06-13 RX ADMIN — LEVOTHYROXINE SODIUM SCH MCG: 50 TABLET ORAL at 05:40

## 2020-06-13 RX ADMIN — LOSARTAN POTASSIUM SCH MG: 50 TABLET, FILM COATED ORAL at 21:33

## 2020-06-13 RX ADMIN — POTASSIUM CHLORIDE SCH: 14.9 INJECTION, SOLUTION INTRAVENOUS at 05:40

## 2020-06-13 RX ADMIN — SODIUM CHLORIDE SCH DROPS: 50 SOLUTION/ DROPS OPHTHALMIC at 21:35

## 2020-06-13 RX ADMIN — THERA TABS SCH EACH: TAB at 17:28

## 2020-06-13 RX ADMIN — INSULIN ASPART SCH: 100 INJECTION, SOLUTION INTRAVENOUS; SUBCUTANEOUS at 16:35

## 2020-06-13 RX ADMIN — CEFAZOLIN SCH: 330 INJECTION, POWDER, FOR SOLUTION INTRAMUSCULAR; INTRAVENOUS at 11:21

## 2020-06-13 RX ADMIN — LORATADINE SCH MG: 10 TABLET ORAL at 08:14

## 2020-06-13 RX ADMIN — CEFAZOLIN SCH: 330 INJECTION, POWDER, FOR SOLUTION INTRAMUSCULAR; INTRAVENOUS at 05:41

## 2020-06-13 RX ADMIN — DOCUSATE SODIUM AND SENNOSIDES SCH EACH: 50; 8.6 TABLET ORAL at 08:14

## 2020-06-13 RX ADMIN — LINAGLIPTIN SCH MG: 5 TABLET, FILM COATED ORAL at 17:28

## 2020-06-13 RX ADMIN — INSULIN DETEMIR SCH UNIT: 100 INJECTION, SOLUTION SUBCUTANEOUS at 18:23

## 2020-06-13 RX ADMIN — Medication SCH MG: at 08:14

## 2020-06-13 NOTE — P.PN
Subjective


Progress Note Date: 06/13/20





This is an 85-year-old pleasant female followed by me at [facility]

known history of diabetes mellitus type 2 requiring insulin, with PAD and other 

complications peripheral neuropathy hypertension, hypothyroidism atrial 

fibrillation, PAD, chronic gangrene/chronic ulcer diabetic foot ulcers right 

heel at least since March 2019, admitted to [facility] back in 

08/30/2019 where she ended up going for right above the knee amputation and she 

has done marvelously well after surgery and she was sent back to the [facility] 

care facility has been doing fine up until 2 days ago when she developed to have

a significant redness and increased drainage from the bottom of the right stump,

I received a call from the nursing staff stated that the patient has fever and 

chills her Perez catheter was cloudy and she was having some bloody pussy 

drainage from the stump he was recommended for the patient be transferred to the

emergency department at [facility] where she was seen and evaluated by 

an x-ray that did not show any evidence of possible myelitis however because of 

the presentation she was admitted to the hospital she was started on IV 

antibiotic in the form of Zosyn and vancomycin and blood cultures and the 

culture from the stump was obtained, infectious disease consultation was 

obtained as well as vascular surgery consultation, patient would be kept in the 

hospital until the final result of the culture is back in until after surgical 

intervention with I&D to the stump.





6/10: Patient sitting up in bed in no apparent distress she denies any chest 

pain, shortness breath, she has not slept well last night, she is scheduled to 

go for I&D of right stump due to abscess, she is maintained on IV anabiotic in 

the form of Zosyn and vancomycin was discontinued per infectious disease, blood 

cultures so far no growth urine culture did show gram-negative bacilli and the 

preliminary wound culture showed gram-positive cocci and gram-negative bacilli 

with a few polymorphonuclear leukocytes, patient will be switched to D5 normal 

saline at 75 mL an hour and we'll hold her diabetic medication as her blood 

glucose level in the morning was about 70, she will be taking her oral 

antihypertensive medications with a few sips of water.





6/11: Patient is sitting up in bed in no apparent distress she denies any chest 

pain, shortness breath, she has no abdominal pain, she has slept well last 

night, she is ready to go for her surgery today for I&D of the right stump ab

scess, she has not had any fever last night.





6/12: Yesterday, patient underwent debridement of the right above-knee 

amputation site with Dr. Perez.  She has had no postprocedure complications.  

Cultures positive for MRSA, Proteus mirabilis and Pseudomonas, urine culture 

positive for Proteus mirabilis and Citrobacter werkmanii. Antibiotics were 

adjusted by Dr. Stovall to cefepime and vancomycin.  Patient has been afebrile, 

heart rate 89, blood pressure 141/68, pulse ox 97% on room air.  Repeat blood 

work revealed WBC 8.4, hemoglobin 7.5.  Platelet count 256.  Sodium 140, 

potassium 4.5, chloride 115, CO2 17, BUN 40 creatinine 1.57.  Blood sugars are 

running between 67 and 187.  INR is 1.1 and patient will be resumed back on 

Coumadin tonight.  Anticipate need for PICC line placement will be requested 

today while patient has subtherapeutic INR.  Anticipate discharge back to 

[facility] on Monday.





6/13: Patient is sitting up in bed in no apparent distress she denies any chest 

pain, shortness breath, she has no abdominal pain, nausea or vomiting, she did 

receive her PICC line in the right upper extremity yesterday, urine culture was 

positive for on Citrobacter as well as Proteus mirabilis, wound cultures 

positive for MRSA as well as pseudomonas Pituda, she will be maintained on 

vancomycin and cefepime, she will be kept in the hospital for another 24 hours 

and she will be transferred back to the [facility] care [facility] Monday morning.





Objective





- Vital Signs


Vital signs: 


                                   Vital Signs











Temp  98.3 F   06/13/20 08:01


 


Pulse  79   06/13/20 08:01


 


Resp  16   06/13/20 08:02


 


BP  164/81   06/13/20 08:01


 


Pulse Ox  98   06/13/20 08:01








                                 Intake & Output











 06/12/20 06/13/20 06/13/20





 18:59 06:59 18:59


 


Intake Total  250 


 


Output Total 625 1000 


 


Balance -625 -750 


 


Weight 81.647 kg  


 


Intake:   


 


  Oral  250 


 


Output:   


 


  Urine 625 1000 


 


Other:   


 


  Voiding Method Indwelling Catheter Indwelling Catheter Indwelling Catheter














- Exam

















Review of Systems


Constitutional: Denies anorexia, Denies chronic headaches, Denies lethargy, 

Denies weakness, Denies weight loss


Eyes: denies blurred vision, denies bulging eye, denies decreased vision


Ears: bilateral: decreased hearing


Ears, nose, mouth and throat: Denies dysphagia, Denies neck lump, Denies sore 

throat


Cardiovascular: Reports leg edema, Denies chest pain, Denies decreased exercise 

tolerance, Denies rapid heart beat, Denies shortness of breath, Denies syncope


Respiratory: Denies congestion, Denies cough with sputum, Denies home oxygen, 

Denies sleep apnea, Denies snoring, Denies wheezing


Gastrointestinal: Denies abdominal pain, Denies bloating, Denies BRBPR, Denies 

excessive gas, Denies heartburn, Denies loss of appetite, Denies melena, Denies 

nausea, Denies vomiting


Genitourinary: Denies dysuria, Denies nocturia


Menstruation: Reports postmenopausal


Musculoskeletal: Reports gait dysfunction


Musculoskeletal: left: foot swelling, absent: hand pain, hand stiffness, hand 

swelling, hip pain, hip stiffness, hip swelling, shoulder pain, shoulder 

stiffness, shoulder swelling, wrist pain, wrist stiffness, wrist swelling


Integumentary: Denies pruritus, Denies rash


Neurological: Denies numbness, Denies weakness


Psychiatric: Denies anxiety, Denies depression


Endocrine: Denies fatigue, Denies weight change





















































Physical examination:








HEENT: Head is atraumatic, normocephalic, pupils were equal round reactive to 

light and accommodations, extraocular muscle movement were intact.





Neck: Supple, no JVP, decreased carotid upstroke bilaterally.





Chest: Clear to auscultation bilaterally there is no crackles no wheezes no 

chest wall tenderness no intercostal retractions.





Heart: First heart sound is depressed, second heart sound is normal, irregularly

irregular, there is systolic ejection murmur 2/6 located in the left sternal 

border.





Abdomen: Soft, nontender, nondistended, positive bowel sounds.





Extremities: Right stump from the above knee amputation showed some redness and 

increase bloody and pus discharge from an open area, left lower extremity with 

edema no calf tenderness dorsalis pedis is +1 on the left side.





Neurologic examination: Patient is awake alert and oriented 3, creatinine 

nerves III-12 appear grossly intact, patient moves all her extremities.








- Labs


CBC & Chem 7: 


                                 06/13/20 07:02





                                 06/13/20 07:02


Labs: 


                  Abnormal Lab Results - Last 24 Hours (Table)











  06/12/20 06/12/20 06/12/20 Range/Units





  11:23 12:21 16:25 


 


RBC     (3.80-5.40)  m/uL


 


Hgb     (11.4-16.0)  gm/dL


 


Hct     (34.0-46.0)  %


 


ESR   101 H   (0-20)  mm/hr


 


INR     (<1.2)  


 


Chloride     ()  mmol/L


 


Carbon Dioxide     (22-30)  mmol/L


 


BUN     (7-17)  mg/dL


 


Creatinine     (0.52-1.04)  mg/dL


 


Glucose     (74-99)  mg/dL


 


POC Glucose (mg/dL)  67 L   172 H  (75-99)  mg/dL


 


Total Protein     (6.3-8.2)  g/dL


 


Albumin     (3.5-5.0)  g/dL














  06/12/20 06/13/20 06/13/20 Range/Units





  20:03 07:02 07:02 


 


RBC   2.72 L   (3.80-5.40)  m/uL


 


Hgb   7.9 L   (11.4-16.0)  gm/dL


 


Hct   24.9 L   (34.0-46.0)  %


 


ESR     (0-20)  mm/hr


 


INR    1.2 H  (<1.2)  


 


Chloride     ()  mmol/L


 


Carbon Dioxide     (22-30)  mmol/L


 


BUN     (7-17)  mg/dL


 


Creatinine     (0.52-1.04)  mg/dL


 


Glucose     (74-99)  mg/dL


 


POC Glucose (mg/dL)  194 H    (75-99)  mg/dL


 


Total Protein     (6.3-8.2)  g/dL


 


Albumin     (3.5-5.0)  g/dL














  06/13/20 Range/Units





  07:02 


 


RBC   (3.80-5.40)  m/uL


 


Hgb   (11.4-16.0)  gm/dL


 


Hct   (34.0-46.0)  %


 


ESR   (0-20)  mm/hr


 


INR   (<1.2)  


 


Chloride  114 H  ()  mmol/L


 


Carbon Dioxide  21 L  (22-30)  mmol/L


 


BUN  36 H  (7-17)  mg/dL


 


Creatinine  1.54 H  (0.52-1.04)  mg/dL


 


Glucose  64 L  (74-99)  mg/dL


 


POC Glucose (mg/dL)   (75-99)  mg/dL


 


Total Protein  6.0 L  (6.3-8.2)  g/dL


 


Albumin  2.7 L  (3.5-5.0)  g/dL








                      Microbiology - Last 24 Hours (Table)











 06/08/20 16:04 Blood Culture - Preliminary





 Blood    No Growth after 96 hours


 


 06/11/20 12:10 Gram Stain - Preliminary





 Leg - Right Wound Culture - Preliminary





    Gram Neg Bacilli














Assessment and Plan


Plan: 


Assessment and plan:











1.  Right above knee amputation stump cellulitis with possible abscess.  

Continue IV cefepime 2 g IV piggyback every 24 hours, culture positive for MRSA 

and Proteus mirabilis and Pseudomonas fluorescens, patient is status post I&D of

the right stump.  Anticipate need for PICC line which will be requested today.  

Coumadin resumed.





2.  Urinary tract infection with sepsis.  Continue IV antibiotic in the form of 

cefepime, urine culture showed Proteus mirabilis and Citrobacter werkmanii.





3.  Acute kidney injury and top of chronic kidney disease stage III.  We will 

Hep-Lock IV.





4.  Hypertension and hypertensive cardiovascular disease.  We will continue 

patient on metoprolol 100 mg orally twice every day, losartan 50 mg orally twice

every day, hydralazine 100 mg orally 3 times every day.





5.  Hyperlipidemia.  Continue patient on pravastatin 80 mg at bedtime.





6.  Diabetes mellitus type 2 into the patient on Levemir 25 units at bedtime 

along with Humalog 10 units before each meal along with a sliding scale insulin,

continue consistent carbohydrate diet, continue with BGM before each meal and at

bedtime we will hold her diabetic medication just before surgery and resume 

after that.





7.  Hypothyroidism.  Continue patient on Synthroid 50 g orally once every day.





8.  Proximal atrial fibrillation.  Continue patient on metoprolol 100 mg orally 

twice every day as well as Coumadin will be resumed this evening she will be 

getting 4 milligram at night and will repeat another dose tomorrow night and 

they were going to 2 milligram every night every that.  Meanwhile we will bridge

with Lovenox.





9.  ALLERGIC rhinitis.  Continue Claritin 10 mg orally once every day as well as

Flonase nasal spray 1 puff in each nostril twice every day.





10.  Severe peripheral arterial occlusive disease status post right above-knee 

amputation.  Continue aspirin and pravastatin for secondary prevention.





11.  Iron deficiency anemia.  Continue iron 325 mg orally once every day monitor

the patient hemoglobin.





12.  Vitamin D deficiency.  Continue patient on vitamin D supplement.





13.  DVT prophylaxis.  Currently on Coumadin keep her INR between 2-3.





14.  GI prophylaxis.  Protonix 40 mg orally once every day.





15.  Plan is to transfer the patient back to [facility] on Monday.

## 2020-06-13 NOTE — P.PN
Subjective


Progress Note Date: 06/13/20


Follow-up for acute kidney injury.








Objective





- Vital Signs


Vital signs: 


                                   Vital Signs











Temp  98.3 F   06/13/20 08:01


 


Pulse  79   06/13/20 08:01


 


Resp  16   06/13/20 08:02


 


BP  164/81   06/13/20 08:01


 


Pulse Ox  98   06/13/20 08:01








                                 Intake & Output











 06/12/20 06/13/20 06/13/20





 18:59 06:59 18:59


 


Intake Total  250 240


 


Output Total 625 1000 


 


Balance -625 -750 240


 


Weight 81.647 kg  


 


Intake:   


 


  Oral  250 240


 


Output:   


 


  Urine 625 1000 


 


Other:   


 


  Voiding Method Indwelling Catheter Indwelling Catheter Indwelling Catheter














- Exam


No acute distress


S1-S2 heard


Lungs clear


Abdomen soft


Right AKA.








- Labs


CBC & Chem 7: 


                                 06/13/20 07:02





                                 06/13/20 07:02


Labs: 


                  Abnormal Lab Results - Last 24 Hours (Table)











  06/12/20 06/12/20 06/12/20 Range/Units





  12:21 16:25 20:03 


 


RBC     (3.80-5.40)  m/uL


 


Hgb     (11.4-16.0)  gm/dL


 


Hct     (34.0-46.0)  %


 


ESR  101 H    (0-20)  mm/hr


 


INR     (<1.2)  


 


Chloride     ()  mmol/L


 


Carbon Dioxide     (22-30)  mmol/L


 


BUN     (7-17)  mg/dL


 


Creatinine     (0.52-1.04)  mg/dL


 


Glucose     (74-99)  mg/dL


 


POC Glucose (mg/dL)   172 H  194 H  (75-99)  mg/dL


 


Total Protein     (6.3-8.2)  g/dL


 


Albumin     (3.5-5.0)  g/dL














  06/13/20 06/13/20 06/13/20 Range/Units





  07:02 07:02 07:02 


 


RBC  2.72 L    (3.80-5.40)  m/uL


 


Hgb  7.9 L    (11.4-16.0)  gm/dL


 


Hct  24.9 L    (34.0-46.0)  %


 


ESR     (0-20)  mm/hr


 


INR   1.2 H   (<1.2)  


 


Chloride    114 H  ()  mmol/L


 


Carbon Dioxide    21 L  (22-30)  mmol/L


 


BUN    36 H  (7-17)  mg/dL


 


Creatinine    1.54 H  (0.52-1.04)  mg/dL


 


Glucose    64 L  (74-99)  mg/dL


 


POC Glucose (mg/dL)     (75-99)  mg/dL


 


Total Protein    6.0 L  (6.3-8.2)  g/dL


 


Albumin    2.7 L  (3.5-5.0)  g/dL














  06/13/20 Range/Units





  11:21 


 


RBC   (3.80-5.40)  m/uL


 


Hgb   (11.4-16.0)  gm/dL


 


Hct   (34.0-46.0)  %


 


ESR   (0-20)  mm/hr


 


INR   (<1.2)  


 


Chloride   ()  mmol/L


 


Carbon Dioxide   (22-30)  mmol/L


 


BUN   (7-17)  mg/dL


 


Creatinine   (0.52-1.04)  mg/dL


 


Glucose   (74-99)  mg/dL


 


POC Glucose (mg/dL)  175 H  (75-99)  mg/dL


 


Total Protein   (6.3-8.2)  g/dL


 


Albumin   (3.5-5.0)  g/dL








                      Microbiology - Last 24 Hours (Table)











 06/11/20 12:10 Gram Stain - Preliminary





 Leg - Right Wound Culture - Preliminary





    Pseudomonas aeruginosa





    Presumptive MRSA


 


 06/08/20 16:04 Blood Culture - Preliminary





 Blood    No Growth after 96 hours














Assessment and Plan


Assessment: 


#1 nonoliguric acute kidney injury secondary to septic ATN.


#2 chronic kidney disease stage III with a baseline creatinine of 1.5 MG per DL 

secondary to suspected diabetic nephropathy.


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


#4 hypertension with chronic kidney disease


#5 diabetes


#6 atrial fibrillation


#7" vancomycin toxicity with level of 24





Plan: 


#1 encourage by mouth intake, creatinine currently stable at baseline.


#2 antibiotics as per infectious disease.  Keep Vanco trough level less than 20


#3 avoid nephrotoxic agents and hypotensive episodes.

## 2020-06-13 NOTE — PN
PROGRESS NOTE



DATE OF SERVICE:

06/30/2020



REASON FOR FOLLOWUP:

Right AK stump wound infection.



INTERVAL HISTORY:

The patient is currently afebrile.  Patient is breathing comfortably.  Denies having

any chest pain or shortness of breath or cough.  Pain to the right AK stump is

currently controlled.  No diarrhea reported.



PHYSICAL EXAMINATION:

Blood pressure 116/72 with a pulse of 93, temperature 98.4, she is 100% on room air.

General description is an elderly female lying in bed in no distress.  Respiratory

system: Unlabored breathing, clear to auscultation anteriorly.  Heart S1, S2.  Regular

rate and rhythm.  Abdomen soft. No tenderness.  Right AKA stump wound looks clean with

no significant slough tissue, surrounding redness has improved.



LABS:

Hemoglobin is 7.8, white count 8.0, BUN of 26, creatinine 1.54. Vanco random has been

on the high side.



DIAGNOSTIC IMPRESSION AND PLAN:

Patient with right above knee amputation stump infection with Pseudomonas and MRSA.

Patient is covered with cefepime and vancomycin, to continue.  Will need to cover.  We

will watch kidney function closely and if needed we will cut it back to keep the trough

around 15.





MMODL / IJN: [id number] / Job#: [id number]

## 2020-06-14 LAB
ANION GAP SERPL CALC-SCNC: 4 MMOL/L
BASOPHILS # BLD AUTO: 0.1 K/UL (ref 0–0.2)
BASOPHILS NFR BLD AUTO: 1 %
BUN SERPL-SCNC: 32 MG/DL (ref 7–17)
CALCIUM SPEC-MCNC: 9 MG/DL (ref 8.4–10.2)
CHLORIDE SERPL-SCNC: 112 MMOL/L (ref 98–107)
CO2 SERPL-SCNC: 21 MMOL/L (ref 22–30)
EOSINOPHIL # BLD AUTO: 0.5 K/UL (ref 0–0.7)
EOSINOPHIL NFR BLD AUTO: 5 %
ERYTHROCYTE [DISTWIDTH] IN BLOOD BY AUTOMATED COUNT: 2.83 M/UL (ref 3.8–5.4)
ERYTHROCYTE [DISTWIDTH] IN BLOOD: 14.6 % (ref 11.5–15.5)
GLUCOSE BLD-MCNC: 114 MG/DL (ref 75–99)
GLUCOSE BLD-MCNC: 115 MG/DL (ref 75–99)
GLUCOSE BLD-MCNC: 166 MG/DL (ref 75–99)
GLUCOSE BLD-MCNC: 182 MG/DL (ref 75–99)
GLUCOSE SERPL-MCNC: 63 MG/DL (ref 74–99)
HCT VFR BLD AUTO: 25.9 % (ref 34–46)
HGB BLD-MCNC: 8.3 GM/DL (ref 11.4–16)
INR PPP: 1.3 (ref ?–1.2)
LYMPHOCYTES # SPEC AUTO: 1.6 K/UL (ref 1–4.8)
LYMPHOCYTES NFR SPEC AUTO: 18 %
MCH RBC QN AUTO: 29.3 PG (ref 25–35)
MCHC RBC AUTO-ENTMCNC: 32.1 G/DL (ref 31–37)
MCV RBC AUTO: 91.2 FL (ref 80–100)
MONOCYTES # BLD AUTO: 0.8 K/UL (ref 0–1)
MONOCYTES NFR BLD AUTO: 9 %
NEUTROPHILS # BLD AUTO: 6 K/UL (ref 1.3–7.7)
NEUTROPHILS NFR BLD AUTO: 66 %
PLATELET # BLD AUTO: 276 K/UL (ref 150–450)
POTASSIUM SERPL-SCNC: 3.5 MMOL/L (ref 3.5–5.1)
PT BLD: 13.3 SEC (ref 9–12)
SODIUM SERPL-SCNC: 137 MMOL/L (ref 137–145)
VANCOMYCIN SERPL-MCNC: 18 UG/ML
WBC # BLD AUTO: 9.1 K/UL (ref 3.8–10.6)

## 2020-06-14 RX ADMIN — CEFAZOLIN SCH: 330 INJECTION, POWDER, FOR SOLUTION INTRAMUSCULAR; INTRAVENOUS at 21:03

## 2020-06-14 RX ADMIN — CEFEPIME HYDROCHLORIDE SCH MLS/HR: 2 INJECTION, POWDER, FOR SOLUTION INTRAVENOUS at 20:51

## 2020-06-14 RX ADMIN — LEVOTHYROXINE SODIUM SCH MCG: 50 TABLET ORAL at 05:22

## 2020-06-14 RX ADMIN — INSULIN ASPART SCH UNIT: 100 INJECTION, SOLUTION INTRAVENOUS; SUBCUTANEOUS at 20:50

## 2020-06-14 RX ADMIN — SODIUM CHLORIDE SCH DROPS: 50 SOLUTION/ DROPS OPHTHALMIC at 08:11

## 2020-06-14 RX ADMIN — POTASSIUM CHLORIDE SCH: 14.9 INJECTION, SOLUTION INTRAVENOUS at 02:25

## 2020-06-14 RX ADMIN — Medication SCH MG: at 08:10

## 2020-06-14 RX ADMIN — DOCUSATE SODIUM AND SENNOSIDES SCH EACH: 50; 8.6 TABLET ORAL at 08:10

## 2020-06-14 RX ADMIN — DEXTROSE MONOHYDRATE AND SODIUM CHLORIDE SCH: 5; .9 INJECTION, SOLUTION INTRAVENOUS at 02:25

## 2020-06-14 RX ADMIN — WARFARIN SODIUM SCH MG: 2 TABLET ORAL at 17:23

## 2020-06-14 RX ADMIN — INSULIN ASPART SCH: 100 INJECTION, SOLUTION INTRAVENOUS; SUBCUTANEOUS at 17:10

## 2020-06-14 RX ADMIN — SODIUM CHLORIDE SCH DROPS: 50 SOLUTION/ DROPS OPHTHALMIC at 20:50

## 2020-06-14 RX ADMIN — INSULIN ASPART SCH: 100 INJECTION, SOLUTION INTRAVENOUS; SUBCUTANEOUS at 17:11

## 2020-06-14 RX ADMIN — LOSARTAN POTASSIUM SCH MG: 50 TABLET, FILM COATED ORAL at 20:50

## 2020-06-14 RX ADMIN — LINAGLIPTIN SCH MG: 5 TABLET, FILM COATED ORAL at 17:22

## 2020-06-14 RX ADMIN — METOPROLOL TARTRATE SCH MG: 50 TABLET, FILM COATED ORAL at 08:10

## 2020-06-14 RX ADMIN — ASPIRIN 81 MG CHEWABLE TABLET SCH MG: 81 TABLET CHEWABLE at 08:10

## 2020-06-14 RX ADMIN — FLUTICASONE PROPIONATE SCH SPRAY: 50 SPRAY, METERED NASAL at 08:11

## 2020-06-14 RX ADMIN — Medication SCH UNIT: at 08:10

## 2020-06-14 RX ADMIN — INSULIN ASPART SCH UNIT: 100 INJECTION, SOLUTION INTRAVENOUS; SUBCUTANEOUS at 08:02

## 2020-06-14 RX ADMIN — PRAVASTATIN SODIUM SCH MG: 80 TABLET ORAL at 17:22

## 2020-06-14 RX ADMIN — LOSARTAN POTASSIUM SCH MG: 50 TABLET, FILM COATED ORAL at 08:10

## 2020-06-14 RX ADMIN — DEXTROSE MONOHYDRATE AND SODIUM CHLORIDE SCH: 5; .9 INJECTION, SOLUTION INTRAVENOUS at 17:11

## 2020-06-14 RX ADMIN — THERA TABS SCH EACH: TAB at 17:22

## 2020-06-14 RX ADMIN — INSULIN ASPART SCH: 100 INJECTION, SOLUTION INTRAVENOUS; SUBCUTANEOUS at 08:01

## 2020-06-14 RX ADMIN — INSULIN ASPART SCH: 100 INJECTION, SOLUTION INTRAVENOUS; SUBCUTANEOUS at 11:35

## 2020-06-14 RX ADMIN — INSULIN DETEMIR SCH UNIT: 100 INJECTION, SOLUTION SUBCUTANEOUS at 17:25

## 2020-06-14 RX ADMIN — METOPROLOL TARTRATE SCH MG: 50 TABLET, FILM COATED ORAL at 20:50

## 2020-06-14 RX ADMIN — CEFAZOLIN SCH: 330 INJECTION, POWDER, FOR SOLUTION INTRAMUSCULAR; INTRAVENOUS at 08:03

## 2020-06-14 RX ADMIN — Medication SCH MG: at 20:50

## 2020-06-14 RX ADMIN — ENOXAPARIN SODIUM SCH MG: 80 INJECTION SUBCUTANEOUS at 08:10

## 2020-06-14 RX ADMIN — LORATADINE SCH MG: 10 TABLET ORAL at 08:10

## 2020-06-14 RX ADMIN — POTASSIUM CHLORIDE SCH: 14.9 INJECTION, SOLUTION INTRAVENOUS at 09:00

## 2020-06-14 RX ADMIN — POTASSIUM CHLORIDE SCH: 14.9 INJECTION, SOLUTION INTRAVENOUS at 21:46

## 2020-06-14 NOTE — P.PN
Subjective


Progress Note Date: 06/14/20





This is an 85-year-old pleasant female followed by me at [facility]

known history of diabetes mellitus type 2 requiring insulin, with PAD and other 

complications peripheral neuropathy hypertension, hypothyroidism atrial 

fibrillation, PAD, chronic gangrene/chronic ulcer diabetic foot ulcers right 

heel at least since March 2019, admitted to [facility] back in 

08/30/2019 where she ended up going for right above the knee amputation and she 

has done marvelously well after surgery and she was sent back to the [facility] 

care facility has been doing fine up until 2 days ago when she developed to have

a significant redness and increased drainage from the bottom of the right stump,

I received a call from the nursing staff stated that the patient has fever and 

chills her Perez catheter was cloudy and she was having some bloody pussy 

drainage from the stump he was recommended for the patient be transferred to the

emergency department at [facility] where she was seen and evaluated by 

an x-ray that did not show any evidence of possible myelitis however because of 

the presentation she was admitted to the hospital she was started on IV 

antibiotic in the form of Zosyn and vancomycin and blood cultures and the 

culture from the stump was obtained, infectious disease consultation was 

obtained as well as vascular surgery consultation, patient would be kept in the 

hospital until the final result of the culture is back in until after surgical 

intervention with I&D to the stump.





6/10: Patient sitting up in bed in no apparent distress she denies any chest 

pain, shortness breath, she has not slept well last night, she is scheduled to 

go for I&D of right stump due to abscess, she is maintained on IV anabiotic in 

the form of Zosyn and vancomycin was discontinued per infectious disease, blood 

cultures so far no growth urine culture did show gram-negative bacilli and the 

preliminary wound culture showed gram-positive cocci and gram-negative bacilli 

with a few polymorphonuclear leukocytes, patient will be switched to D5 normal 

saline at 75 mL an hour and we'll hold her diabetic medication as her blood 

glucose level in the morning was about 70, she will be taking her oral 

antihypertensive medications with a few sips of water.





6/11: Patient is sitting up in bed in no apparent distress she denies any chest 

pain, shortness breath, she has no abdominal pain, she has slept well last 

night, she is ready to go for her surgery today for I&D of the right stump ab

scess, she has not had any fever last night.





6/12: Yesterday, patient underwent debridement of the right above-knee 

amputation site with Dr. Perez.  She has had no postprocedure complications.  

Cultures positive for MRSA, Proteus mirabilis and Pseudomonas, urine culture 

positive for Proteus mirabilis and Citrobacter werkmanii. Antibiotics were 

adjusted by Dr. Stovall to cefepime and vancomycin.  Patient has been afebrile, 

heart rate 89, blood pressure 141/68, pulse ox 97% on room air.  Repeat blood 

work revealed WBC 8.4, hemoglobin 7.5.  Platelet count 256.  Sodium 140, 

potassium 4.5, chloride 115, CO2 17, BUN 40 creatinine 1.57.  Blood sugars are 

running between 67 and 187.  INR is 1.1 and patient will be resumed back on 

Coumadin tonight.  Anticipate need for PICC line placement will be requested 

today while patient has subtherapeutic INR.  Anticipate discharge back to 

[facility] on Monday.





6/13: Patient is sitting up in bed in no apparent distress she denies any chest 

pain, shortness breath, she has no abdominal pain, nausea or vomiting, she did 

receive her PICC line in the right upper extremity yesterday, urine culture was 

positive for on Citrobacter as well as Proteus mirabilis, wound cultures 

positive for MRSA as well as pseudomonas Pituda, she will be maintained on 

vancomycin and cefepime, she will be kept in the hospital for another 24 hours 

and she will be transferred back to the [facility] care facility Monday morning.





6/14: Patient is laying down in bed in no apparent distress, she denies any 

chest pain, she doesn't appear to be in acute shortness of breath, she has no 

abdominal pain, she has not had a bowel movement, she has no acute complaint, 

she likely be discharged back to the extended care facility tomorrow morning.





Objective





- Vital Signs


Vital signs: 


                                   Vital Signs











Temp  98.4 F   06/14/20 06:48


 


Pulse  87   06/14/20 06:48


 


Resp  16   06/14/20 06:48


 


BP  172/76   06/14/20 06:48


 


Pulse Ox  100   06/14/20 06:48








                                 Intake & Output











 06/13/20 06/14/20 06/14/20





 18:59 06:59 18:59


 


Intake Total 240 220 240


 


Output Total 650 625 


 


Balance -410 -405 240


 


Intake:   


 


  Intake, IV Titration  220 





  Amount   


 


    Cefepime 2 gm In Sodium  100 





    Chloride 0.9% 100 ml @   





    200 mls/hr IVPB Q24H [facility]   





    Rx#:[id number]   


 


    Sodium Chloride 0.9% 1,  120 





    000 ml @ 75 mls/hr IV .   





    [id number] [facility] Rx#:[id number]   


 


  Oral 240  240


 


Output:   


 


  Urine 650 625 


 


    Uretheral (Perez)  625 


 


Other:   


 


  Voiding Method Indwelling Catheter Indwelling Catheter Indwelling Catheter


 


  # Voids 1  














- Exam

















Review of Systems


Constitutional: Denies anorexia, Denies chronic headaches, Denies lethargy, 

Denies weakness, Denies weight loss


Eyes: denies blurred vision, denies bulging eye, denies decreased vision


Ears: bilateral: decreased hearing


Ears, nose, mouth and throat: Denies dysphagia, Denies neck lump, Denies sore 

throat


Cardiovascular: Reports leg edema, Denies chest pain, Denies decreased exercise 

tolerance, Denies rapid heart beat, Denies shortness of breath, Denies syncope


Respiratory: Denies congestion, Denies cough with sputum, Denies home oxygen, 

Denies sleep apnea, Denies snoring, Denies wheezing


Gastrointestinal: Denies abdominal pain, Denies bloating, Denies BRBPR, Denies 

excessive gas, Denies heartburn, Denies loss of appetite, Denies melena, Denies 

nausea, Denies vomiting


Genitourinary: Denies dysuria, Denies nocturia


Menstruation: Reports postmenopausal


Musculoskeletal: Reports gait dysfunction


Musculoskeletal: left: foot swelling, absent: hand pain, hand stiffness, hand 

swelling, hip pain, hip stiffness, hip swelling, shoulder pain, shoulder 

stiffness, shoulder swelling, wrist pain, wrist stiffness, wrist swelling


Integumentary: Denies pruritus, Denies rash


Neurological: Denies numbness, Denies weakness


Psychiatric: Denies anxiety, Denies depression


Endocrine: Denies fatigue, Denies weight change





















































Physical examination:








HEENT: Head is atraumatic, normocephalic, pupils were equal round reactive to 

light and accommodations, extraocular muscle movement were intact.





Neck: Supple, no JVP, decreased carotid upstroke bilaterally.





Chest: Clear to auscultation bilaterally there is no crackles no wheezes no 

chest wall tenderness no intercostal retractions.





Heart: First heart sound is depressed, second heart sound is normal, irregularly

irregular, there is systolic ejection murmur 2/6 located in the left sternal 

border.





Abdomen: Soft, nontender, nondistended, positive bowel sounds.





Extremities: Right stump from the above knee amputation showed some redness and 

increase bloody and pus discharge from an open area, left lower extremity with 

edema no calf tenderness dorsalis pedis is +1 on the left side.





Neurologic examination: Patient is awake alert and oriented 3, creatinine 

nerves III-12 appear grossly intact, patient moves all her extremities.








- Labs


CBC & Chem 7: 


                                 06/14/20 07:06





                                 06/14/20 07:06


Labs: 


                  Abnormal Lab Results - Last 24 Hours (Table)











  06/13/20 06/13/20 06/13/20 Range/Units





  11:21 16:24 21:21 


 


RBC     (3.80-5.40)  m/uL


 


Hgb     (11.4-16.0)  gm/dL


 


Hct     (34.0-46.0)  %


 


PT     (9.0-12.0)  sec


 


INR     (<1.2)  


 


Chloride     ()  mmol/L


 


Carbon Dioxide     (22-30)  mmol/L


 


BUN     (7-17)  mg/dL


 


Creatinine     (0.52-1.04)  mg/dL


 


Glucose     (74-99)  mg/dL


 


POC Glucose (mg/dL)  175 H  146 H  199 H  (75-99)  mg/dL














  06/14/20 06/14/20 06/14/20 Range/Units





  06:55 07:06 07:06 


 


RBC     (3.80-5.40)  m/uL


 


Hgb     (11.4-16.0)  gm/dL


 


Hct     (34.0-46.0)  %


 


PT   13.3 H   (9.0-12.0)  sec


 


INR   1.3 H   (<1.2)  


 


Chloride    112 H  ()  mmol/L


 


Carbon Dioxide    21 L  (22-30)  mmol/L


 


BUN    32 H  (7-17)  mg/dL


 


Creatinine    1.52 H  (0.52-1.04)  mg/dL


 


Glucose    63 L  (74-99)  mg/dL


 


POC Glucose (mg/dL)  182 H    (75-99)  mg/dL














  06/14/20 Range/Units





  07:06 


 


RBC  2.83 L  (3.80-5.40)  m/uL


 


Hgb  8.3 L  (11.4-16.0)  gm/dL


 


Hct  25.9 L  (34.0-46.0)  %


 


PT   (9.0-12.0)  sec


 


INR   (<1.2)  


 


Chloride   ()  mmol/L


 


Carbon Dioxide   (22-30)  mmol/L


 


BUN   (7-17)  mg/dL


 


Creatinine   (0.52-1.04)  mg/dL


 


Glucose   (74-99)  mg/dL


 


POC Glucose (mg/dL)   (75-99)  mg/dL








                      Microbiology - Last 24 Hours (Table)











 06/11/20 12:10 Gram Stain - Final





 Leg - Right Wound Culture - Final





    Pseudomonas aeruginosa





    Methicillin resist S. aureus


 


 06/08/20 16:04 Blood Culture - Preliminary





 Blood    No Growth after 120 hours














Assessment and Plan


Plan: 


Assessment and plan:











1.  Right above knee amputation stump cellulitis with possible abscess.  

Continue IV cefepime 2 g IV piggyback every 24 hours, culture positive for MRSA 

and Proteus mirabilis and Pseudomonas fluorescens, patient is status post I&D of

the right stump.  Post PICC line placement patient is ready to be transferred to

extended care facility tomorrow morning.





2.  Urinary tract infection with sepsis.  Continue IV antibiotic in the form of 

cefepime, urine culture showed Proteus mirabilis and Citrobacter werkmanii.





3.  Acute kidney injury and top of chronic kidney disease stage III.  We will 

Hep-Lock IV.





4.  Hypertension and hypertensive cardiovascular disease.  We will continue 

patient on metoprolol 100 mg orally twice every day, losartan 50 mg orally twice

every day, hydralazine 100 mg orally 3 times every day.





5.  Hyperlipidemia.  Continue patient on pravastatin 80 mg at bedtime.





6.  Diabetes mellitus type 2 into the patient on Levemir 25 units at bedtime 

along with Humalog 10 units before each meal along with a sliding scale insulin,

continue consistent carbohydrate diet, continue with BGM before each meal and at

bedtime we will hold her diabetic medication just before surgery and resume 

after that.





7.  Hypothyroidism.  Continue patient on Synthroid 50 g orally once every day.





8.  Proximal atrial fibrillation.  Continue patient on metoprolol 100 mg orally 

twice every day as well as Coumadin will be resumed this evening she will be 

getting 4 milligram at night and will repeat another dose tomorrow night and 

they were going to 2 milligram every night every that.  Meanwhile we will bridge

with Lovenox.





9.  ALLERGIC rhinitis.  Continue Claritin 10 mg orally once every day as well as

Flonase nasal spray 1 puff in each nostril twice every day.





10.  Severe peripheral arterial occlusive disease status post right above-knee 

amputation.  Continue aspirin and pravastatin for secondary prevention.





11.  Iron deficiency anemia.  Continue iron 325 mg orally once every day monitor

the patient hemoglobin.





12.  Vitamin D deficiency.  Continue patient on vitamin D supplement.





13.  DVT prophylaxis.  Currently on Coumadin keep her INR between 2-3.





14.  GI prophylaxis.  Protonix 40 mg orally once every day.





15.  Plan is to transfer the patient back to [facility] on Monday.

## 2020-06-14 NOTE — P.PN
Subjective


Progress Note Date: 06/14/20


Follow-up for acute kidney injury.








Objective





- Vital Signs


Vital signs: 


                                   Vital Signs











Temp  98.4 F   06/14/20 06:48


 


Pulse  87   06/14/20 06:48


 


Resp  16   06/14/20 06:48


 


BP  172/76   06/14/20 06:48


 


Pulse Ox  100   06/14/20 06:48








                                 Intake & Output











 06/13/20 06/14/20 06/14/20





 18:59 06:59 18:59


 


Intake Total 240 220 240


 


Output Total 650 625 


 


Balance -410 -405 240


 


Intake:   


 


  Intake, IV Titration  220 





  Amount   


 


    Cefepime 2 gm In Sodium  100 





    Chloride 0.9% 100 ml @   





    200 mls/hr IVPB Q24H [facility]   





    Rx#:[id number]   


 


    Sodium Chloride 0.9% 1,  120 





    000 ml @ 75 mls/hr IV .   





    [id number] [facility] Rx#:[id number]   


 


  Oral 240  240


 


Output:   


 


  Urine 650 625 


 


    Uretheral (Perez)  625 


 


Other:   


 


  Voiding Method Indwelling Catheter Indwelling Catheter Indwelling Catheter


 


  # Voids 1  














- Exam


No acute distress


S1-S2 heard


Lungs clear


Abdomen soft


Right AKA.








- Labs


CBC & Chem 7: 


                                 06/14/20 07:06





                                 06/14/20 07:06


Labs: 


                  Abnormal Lab Results - Last 24 Hours (Table)











  06/13/20 06/13/20 06/14/20 Range/Units





  16:24 21:21 06:55 


 


RBC     (3.80-5.40)  m/uL


 


Hgb     (11.4-16.0)  gm/dL


 


Hct     (34.0-46.0)  %


 


PT     (9.0-12.0)  sec


 


INR     (<1.2)  


 


Chloride     ()  mmol/L


 


Carbon Dioxide     (22-30)  mmol/L


 


BUN     (7-17)  mg/dL


 


Creatinine     (0.52-1.04)  mg/dL


 


Glucose     (74-99)  mg/dL


 


POC Glucose (mg/dL)  146 H  199 H  182 H  (75-99)  mg/dL














  06/14/20 06/14/20 06/14/20 Range/Units





  07:06 07:06 07:06 


 


RBC    2.83 L  (3.80-5.40)  m/uL


 


Hgb    8.3 L  (11.4-16.0)  gm/dL


 


Hct    25.9 L  (34.0-46.0)  %


 


PT  13.3 H    (9.0-12.0)  sec


 


INR  1.3 H    (<1.2)  


 


Chloride   112 H   ()  mmol/L


 


Carbon Dioxide   21 L   (22-30)  mmol/L


 


BUN   32 H   (7-17)  mg/dL


 


Creatinine   1.52 H   (0.52-1.04)  mg/dL


 


Glucose   63 L   (74-99)  mg/dL


 


POC Glucose (mg/dL)     (75-99)  mg/dL














  06/14/20 Range/Units





  11:32 


 


RBC   (3.80-5.40)  m/uL


 


Hgb   (11.4-16.0)  gm/dL


 


Hct   (34.0-46.0)  %


 


PT   (9.0-12.0)  sec


 


INR   (<1.2)  


 


Chloride   ()  mmol/L


 


Carbon Dioxide   (22-30)  mmol/L


 


BUN   (7-17)  mg/dL


 


Creatinine   (0.52-1.04)  mg/dL


 


Glucose   (74-99)  mg/dL


 


POC Glucose (mg/dL)  114 H  (75-99)  mg/dL








                      Microbiology - Last 24 Hours (Table)











 06/11/20 12:10 Gram Stain - Final





 Leg - Right Wound Culture - Final





    Pseudomonas aeruginosa





    Methicillin resist S. aureus


 


 06/08/20 16:04 Blood Culture - Preliminary





 Blood    No Growth after 120 hours














Assessment and Plan


Assessment: 


#1 nonoliguric acute kidney injury secondary to septic ATN.


#2 chronic kidney disease stage III with a baseline creatinine of 1.5 MG per DL 

secondary to suspected diabetic nephropathy.


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


#4 hypertension with chronic kidney disease


#5 diabetes


#6 atrial fibrillation


#7" vancomycin toxicity with level of 24, Repeat levels are 18.





Plan: 


#1 encourage Oral intake, creatinine currently stable at baseline.


#2 antibiotics as per infectious disease.  Keep Vanco trough level less than 20


#3 avoid nephrotoxic agents and hypotensive episodes.

## 2020-06-15 VITALS — TEMPERATURE: 98.3 F

## 2020-06-15 VITALS — RESPIRATION RATE: 17 BRPM | HEART RATE: 76 BPM | DIASTOLIC BLOOD PRESSURE: 72 MMHG | SYSTOLIC BLOOD PRESSURE: 145 MMHG

## 2020-06-15 LAB
ANION GAP SERPL CALC-SCNC: 7 MMOL/L
BASOPHILS # BLD AUTO: 0.1 K/UL (ref 0–0.2)
BASOPHILS NFR BLD AUTO: 1 %
BUN SERPL-SCNC: 29 MG/DL (ref 7–17)
CALCIUM SPEC-MCNC: 9.2 MG/DL (ref 8.4–10.2)
CHLORIDE SERPL-SCNC: 109 MMOL/L (ref 98–107)
CO2 SERPL-SCNC: 22 MMOL/L (ref 22–30)
EOSINOPHIL # BLD AUTO: 0.6 K/UL (ref 0–0.7)
EOSINOPHIL NFR BLD AUTO: 6 %
ERYTHROCYTE [DISTWIDTH] IN BLOOD BY AUTOMATED COUNT: 2.93 M/UL (ref 3.8–5.4)
ERYTHROCYTE [DISTWIDTH] IN BLOOD: 14.6 % (ref 11.5–15.5)
GLUCOSE BLD-MCNC: 175 MG/DL (ref 75–99)
GLUCOSE BLD-MCNC: 56 MG/DL (ref 75–99)
GLUCOSE BLD-MCNC: 60 MG/DL (ref 75–99)
GLUCOSE BLD-MCNC: 86 MG/DL (ref 75–99)
GLUCOSE BLD-MCNC: 98 MG/DL (ref 75–99)
GLUCOSE SERPL-MCNC: 78 MG/DL (ref 74–99)
HCT VFR BLD AUTO: 26.6 % (ref 34–46)
HGB BLD-MCNC: 8.5 GM/DL (ref 11.4–16)
INR PPP: 1.6 (ref ?–1.2)
LYMPHOCYTES # SPEC AUTO: 1.8 K/UL (ref 1–4.8)
LYMPHOCYTES NFR SPEC AUTO: 19 %
MCH RBC QN AUTO: 29 PG (ref 25–35)
MCHC RBC AUTO-ENTMCNC: 31.9 G/DL (ref 31–37)
MCV RBC AUTO: 90.9 FL (ref 80–100)
MONOCYTES # BLD AUTO: 0.9 K/UL (ref 0–1)
MONOCYTES NFR BLD AUTO: 9 %
NEUTROPHILS # BLD AUTO: 6.1 K/UL (ref 1.3–7.7)
NEUTROPHILS NFR BLD AUTO: 64 %
PLATELET # BLD AUTO: 297 K/UL (ref 150–450)
POTASSIUM SERPL-SCNC: 3.7 MMOL/L (ref 3.5–5.1)
PT BLD: 15.7 SEC (ref 9–12)
SODIUM SERPL-SCNC: 138 MMOL/L (ref 137–145)
WBC # BLD AUTO: 9.6 K/UL (ref 3.8–10.6)

## 2020-06-15 RX ADMIN — CEFAZOLIN SCH: 330 INJECTION, POWDER, FOR SOLUTION INTRAMUSCULAR; INTRAVENOUS at 12:03

## 2020-06-15 RX ADMIN — METOPROLOL TARTRATE SCH MG: 50 TABLET, FILM COATED ORAL at 07:40

## 2020-06-15 RX ADMIN — FLUTICASONE PROPIONATE SCH SPRAY: 50 SPRAY, METERED NASAL at 07:41

## 2020-06-15 RX ADMIN — PRAVASTATIN SODIUM SCH MG: 80 TABLET ORAL at 16:50

## 2020-06-15 RX ADMIN — LOSARTAN POTASSIUM SCH MG: 50 TABLET, FILM COATED ORAL at 07:40

## 2020-06-15 RX ADMIN — ENOXAPARIN SODIUM SCH MG: 80 INJECTION SUBCUTANEOUS at 07:39

## 2020-06-15 RX ADMIN — INSULIN DETEMIR SCH UNIT: 100 INJECTION, SOLUTION SUBCUTANEOUS at 17:44

## 2020-06-15 RX ADMIN — LEVOTHYROXINE SODIUM SCH MCG: 50 TABLET ORAL at 05:36

## 2020-06-15 RX ADMIN — ASPIRIN 81 MG CHEWABLE TABLET SCH MG: 81 TABLET CHEWABLE at 07:40

## 2020-06-15 RX ADMIN — Medication SCH UNIT: at 07:40

## 2020-06-15 RX ADMIN — THERA TABS SCH EACH: TAB at 16:50

## 2020-06-15 RX ADMIN — POTASSIUM CHLORIDE SCH: 14.9 INJECTION, SOLUTION INTRAVENOUS at 14:53

## 2020-06-15 RX ADMIN — DEXTROSE MONOHYDRATE AND SODIUM CHLORIDE SCH: 5; .9 INJECTION, SOLUTION INTRAVENOUS at 11:48

## 2020-06-15 RX ADMIN — Medication SCH MG: at 07:40

## 2020-06-15 RX ADMIN — DOCUSATE SODIUM AND SENNOSIDES SCH EACH: 50; 8.6 TABLET ORAL at 07:40

## 2020-06-15 RX ADMIN — INSULIN ASPART SCH: 100 INJECTION, SOLUTION INTRAVENOUS; SUBCUTANEOUS at 07:38

## 2020-06-15 RX ADMIN — INSULIN ASPART SCH UNIT: 100 INJECTION, SOLUTION INTRAVENOUS; SUBCUTANEOUS at 11:56

## 2020-06-15 RX ADMIN — INSULIN ASPART SCH UNIT: 100 INJECTION, SOLUTION INTRAVENOUS; SUBCUTANEOUS at 11:57

## 2020-06-15 RX ADMIN — LORATADINE SCH MG: 10 TABLET ORAL at 07:40

## 2020-06-15 RX ADMIN — LINAGLIPTIN SCH MG: 5 TABLET, FILM COATED ORAL at 16:50

## 2020-06-15 RX ADMIN — SODIUM CHLORIDE SCH DROPS: 50 SOLUTION/ DROPS OPHTHALMIC at 07:41

## 2020-06-15 RX ADMIN — INSULIN ASPART SCH: 100 INJECTION, SOLUTION INTRAVENOUS; SUBCUTANEOUS at 16:51

## 2020-06-15 NOTE — PN
PROGRESS NOTE



DATE OF SERVICE:

06/14/2020



REASON FOR FOLLOWUP:

Right AKA stump wound infection.



INTERVAL HISTORY:

The patient is currently afebrile. Patient is breathing comfortably. The patient denies

having any chest pain, shortness of breath or cough. No nausea, no vomiting, or any

worsening pain to the right AKA stump.



PHYSICAL EXAMINATION:

Blood pressure 174/60 with a pulse of 84, temperature 98. She is 100% on room air.

General description is an elderly female lying in bed in no distress.

RESPIRATORY SYSTEM: Unlabored breathing, clear to auscultation anteriorly.

HEART: S1, S2.  Regular rate and rhythm.

ABDOMEN:  Soft, no tenderness.

Right AKA stump is currently dressed.  No obvious drainage on the dressing.



LABS:

Hemoglobin 8.3, white count 9.1.  BUN of 32, creatinine 1.52.



DIAGNOSTIC IMPRESSION AND PLAN:

Patient with right above knee stump wound infection status post debridement.  Culture

with Pseudomonas and MRSA.  The patient at this time is covered with vancomycin and

cefepime to to continue for 2 weeks.  Local wound care with wet-to-dry dressing, which

can be switched to wound VAC on discharge to nursing home. Continue with supportive

care.





MMODL / IJN: [id number] / Job#: [id number]

## 2020-06-15 NOTE — P.DS
Providers


Date of admission: 


06/08/20 17:54





Expected date of discharge: 06/15/20


Attending physician: 


Azalea Pardo





Consults: 





                                        





06/08/20 18:18


Consult Physician Stat 


   Consulting Provider: Christy Perez


   Consult Reason/Comments: right lower extremity infection s/p amputation


   Do you want consulting provider notified?: Yes





06/08/20 18:52


Consult Physician Stat 


   Consulting Provider: Chon Stovall


   Consult Reason/Comments: LLE infection hx pseudomonas


   Do you want consulting provider notified?: Yes





06/12/20 13:10


Consult Physician Routine 


   Consulting Provider: Chris Olson


   Consult Reason/Comments: PICC placement. low GFR. Need Clearance and arm 

choice


   Do you want consulting provider notified?: Yes





06/12/20 13:12


Consult Physician Routine 


   Consulting Provider: Chris Olson


   Consult Reason/Comments: GAVIN, PICC insertion


   Do you want consulting provider notified?: Yes











Primary care physician: 


Azalea Pardo





[place] [facility] Course: 





This is an 85-year-old pleasant female followed by me at [facility]

known history of diabetes mellitus type 2 requiring insulin, with PAD and other 

complications peripheral neuropathy hypertension, hypothyroidism atrial 

fibrillation, PAD, chronic gangrene/chronic ulcer diabetic foot ulcers right 

heel at least since March 2019, admitted to [facility] back in 

08/30/2019 where she ended up going for right above the knee amputation and she 

has done marvelously well after surgery and she was sent back to the [facility] 

care facility has been doing fine up until 2 days ago when she developed to have

a significant redness and increased drainage from the bottom of the right stump,

I received a call from the nursing staff stated that the patient has fever and 

chills her Perez catheter was cloudy and she was having some bloody pussy 

drainage from the stump he was recommended for the patient be transferred to the

emergency department at [facility] where she was seen and evaluated by 

an x-ray that did not show any evidence of possible myelitis however because of 

the presentation she was admitted to the hospital she was started on IV 

antibiotic in the form of Zosyn and vancomycin and blood cultures and the 

culture from the stump was obtained, infectious disease consultation was 

obtained as well as vascular surgery consultation, patient would be kept in the 

hospital until the final result of the culture is back in until after surgical 

intervention with I&D to the stump.





6/10: Patient sitting up in bed in no apparent distress she denies any chest 

pain, shortness breath, she has not slept well last night, she is scheduled to 

go for I&D of right stump due to abscess, she is maintained on IV anabiotic in 

the form of Zosyn and vancomycin was discontinued per infectious disease, blood 

cultures so far no growth urine culture did show gram-negative bacilli and the 

preliminary wound culture showed gram-positive cocci and gram-negative bacilli 

with a few polymorphonuclear leukocytes, patient will be switched to D5 normal 

saline at 75 mL an hour and we'll hold her diabetic medication as her blood 

glucose level in the morning was about 70, she will be taking her oral 

antihypertensive medications with a few sips of water.





6/11: Patient is sitting up in bed in no apparent distress she denies any chest 

pain, shortness breath, she has no abdominal pain, she has slept well last 

night, she is ready to go for her surgery today for I&D of the right stump 

abscess, she has not had any fever last night.





6/12: Yesterday, patient underwent debridement of the right above-knee 

amputation site with Dr. Perez.  She has had no postprocedure complications.  

Cultures positive for MRSA, Proteus mirabilis and Pseudomonas, urine culture 

positive for Proteus mirabilis and Citrobacter werkmanii. Antibiotics were 

adjusted by Dr. Stovall to cefepime and vancomycin.  Patient has been afebrile, 

heart rate 89, blood pressure 141/68, pulse ox 97% on room air.  Repeat blood 

work revealed WBC 8.4, hemoglobin 7.5.  Platelet count 256.  Sodium 140, 

potassium 4.5, chloride 115, CO2 17, BUN 40 creatinine 1.57.  Blood sugars are 

running between 67 and 187.  INR is 1.1 and patient will be resumed back on 

Coumadin tonight.  Anticipate need for PICC line placement will be requested 

today while patient has subtherapeutic INR.  Anticipate discharge back to 

[facility] on Monday.





6/13: Patient is sitting up in bed in no apparent distress she denies any chest 

pain, shortness breath, she has no abdominal pain, nausea or vomiting, she did 

receive her PICC line in the right upper extremity yesterday, urine culture was 

positive for on Citrobacter as well as Proteus mirabilis, wound cultures 

positive for MRSA as well as pseudomonas Pituda, she will be maintained on 

vancomycin and cefepime, she will be kept in the hospital for another 24 hours 

and she will be transferred back to the [facility] care [facility] Monday morning.





6/14: Patient is laying down in bed in no apparent distress, she denies any c

hest pain, she doesn't appear to be in acute shortness of breath, she has no 

abdominal pain, she has not had a bowel movement, she has no acute complaint, 

she likely be discharged back to the extended care facility tomorrow morning.





6/15: Patient continues to do well.  She denies any chest pain or shortness of 

breath.  Patient has been afebrile, heart rate 86, blood pressure 136/72, pulse 

ox 90% on room air. Wound cultures positive for Pseudomonas and MRSA and Dr. Stovall recommends 2 weeks of vancomycin and cefepime.  PICC line was inserted on 

Friday.  Local wound care with wet-to-dry dressing which can be switched to a 

Pleur-evac and discharged to nursing home.   Repeat lab work reveals INR 1.6, 

BUN 29 creatinine 1.44.  Hemoglobin 8.5.  Patient will be discharged to nursing 

home on Lovenox and Coumadin until INR is therapeutic.  Patient will be 

discharged once all arrangements are completed.








Discharge Diagnoses:


1.  Right above knee amputation stump cellulitis with amputation site abscess.  


2.  Urinary tract infection with sepsis.  Continue IV antibiotic in the form of 

cefepime, urine culture showed Proteus mirabilis and Citrobacter werkmanii.


3.  Acute kidney injury and top of chronic kidney disease stage III. 


4.  Hypertension and hypertensive cardiovascular disease.


5.  Hyperlipidemia.  


6.  Diabetes mellitus type 2 .


7.  Hypothyroidism.  


8.  Proximal atrial fibrillation.  


9.  ALLERGIC rhinitis. 


10.  Severe peripheral arterial occlusive disease status post right above-knee 

amputation. 


11.  Iron deficiency anemia. 


12.  Vitamin D deficiency. 





Discharge plan: Return to [facility]








Patient Condition at Discharge: Good





Plan - Discharge Summary


Discharge Rx Participant: No


New Discharge Prescriptions: 


New


   Magnesium Hydroxide [Milk of Magnesia Concentrate] 2,400 mg PO ONCE PRN  ml


     PRN Reason: Constipation


   Sodium Bicarbonate Tab 650 mg PO BID  tab


   Cefepime HCl [Maxipime] 2 gm IVPB Q24H #14 vial


   Vancomycin 1,000 mg IVPB Q24HR #14 bag


   Warfarin [Coumadin] 2 mg PO DAILY@1800  tab





Continue


   Aspirin 81 mg PO DAILY


   Pravastatin Sodium [Pravachol] 80 mg PO DAILY@1800


   Levothyroxine Sodium [Synthroid] 50 mcg PO QAM


   Sennosides-Docusate Sodium [Senokot-S] 1 tab PO DAILY


   Sodium Chloride 5% Ophth Soln [Vahid 128] 1 drop BOTH EYES BID


   Multivitamins, Thera [Multivitamin (formulary)] 1 tab PO DAILY@1800


   hydrALAZINE HCL [Apresoline] 100 mg PO TID


   Metoprolol Tartrate [Lopressor] 100 mg PO BID


   sitaGLIPtin PHOSPHATE [Januvia] 50 mg PO DAILY@1800


   Cranberry 450mg 450 mg PO DAILY


   Cholecalciferol (Vitamin D3) [Vitamin D3] 2,000 unit PO DAILY


   Fluticasone Nasal Spray [Flonase Nasal Spray] 1 spr EA NOSTRIL DAILY


   Ferrous Sulfate [Feosol] 325 mg PO DAILY


   Loratadine [Claritin] 10 mg PO DAILY


   Insulin Lispro [humaLOG Kwikpen] See Protocol SQ ACHS


   Insulin Lispro [humaLOG Kwikpen] 10 unit SQ AC-TID


   Losartan Potassium 50 mg PO BID


   Insulin Glargine [Lantus] 25 unit SQ DAILY@1800


   Acetaminophen [Tylenol Arthritis] 650 mg PO Q6H PRN


     PRN Reason: Pain





Discontinued


   Spironolactone [Aldactone] 12.5 mg PO DAILY


   Warfarin [Coumadin] 4 mg PO DAILY@1800  tab


Discharge Medication List





Aspirin 81 mg PO DAILY 02/08/16 [History]


Levothyroxine Sodium [Synthroid] 50 mcg PO QAM 02/08/16 [History]


Pravastatin Sodium [Pravachol] 80 mg PO DAILY@1800 02/08/16 [History]


Sennosides-Docusate Sodium [Senokot-S] 1 tab PO DAILY 11/13/17 [History]


Multivitamins, Thera [Multivitamin (formulary)] 1 tab PO DAILY@1800 10/01/18 

[History]


Sodium Chloride 5% Ophth Soln [Vahid 128] 1 drop BOTH EYES BID 10/01/18 [History]


Cranberry 450mg 450 mg PO DAILY 03/12/19 [History]


Metoprolol Tartrate [Lopressor] 100 mg PO BID 03/12/19 [History]


hydrALAZINE HCL [Apresoline] 100 mg PO TID 03/12/19 [History]


sitaGLIPtin PHOSPHATE [Januvia] 50 mg PO DAILY@1800 03/12/19 [History]


Cholecalciferol (Vitamin D3) [Vitamin D3] 2,000 unit PO DAILY 08/28/19 [History]


Ferrous Sulfate [Feosol] 325 mg PO DAILY 08/28/19 [History]


Fluticasone Nasal Spray [Flonase Nasal Spray] 1 spr EA NOSTRIL DAILY 08/28/19 

[History]


Loratadine [Claritin] 10 mg PO DAILY 08/28/19 [History]


Acetaminophen [Tylenol Arthritis] 650 mg PO Q6H PRN 06/08/20 [History]


Insulin Glargine [Lantus] 25 unit SQ DAILY@1800 06/08/20 [History]


Insulin Lispro [humaLOG Kwikpen] 10 unit SQ AC-TID 06/08/20 [History]


Insulin Lispro [humaLOG Kwikpen] See Protocol SQ ACHS 06/08/20 [History]


Losartan Potassium 50 mg PO BID 06/08/20 [History]


Cefepime HCl [Maxipime] 2 gm IVPB Q24H #14 vial 06/15/20 [Rx]


Magnesium Hydroxide [Milk of Magnesia Concentrate] 2,400 mg PO ONCE PRN  ml 

06/15/20 [Rx]


Sodium Bicarbonate Tab 650 mg PO BID  tab 06/15/20 [Rx]


Vancomycin 1,000 mg IVPB Q24HR #14 bag 06/15/20 [Rx]


Warfarin [Coumadin] 2 mg PO DAILY@1800  tab 06/15/20 [Rx]








Follow up Appointment(s)/Referral(s): 


Azalea Pardo MD [Primary Care Provider] - 1-2 days (at [facility])


Christy Perez DO [STAFF PHYSICIAN] - 07/01/20 10:30 am


(1-2 weeks





[facility] Phone Number: [phone]


)


Chris Olson DO [STAFF PHYSICIAN] - 07/07/20 11:20 am


Ambulatory/Diagnostic Orders: 


Basic Metabolic Panel [LAB.AMB] Location: None Selected


Complete Blood Count w/diff [LAB.AMB] Location: None Selected


Prothrombin Time INR [LAB.AMB] Location: None Selected


Activity/Diet/Wound Care/Special Instructions: 


Wound Vac


Discharge Disposition: TRANSFER TO SNF/ECF

## 2020-06-15 NOTE — PN
PROGRESS NOTE



DATE OF SERVICE:

06/15/2020.



REASON FOR FOLLOWUP:

Right AKA stump wound infection pneumonia, MRSA.



INTERVAL HISTORY:

The patient is currently afebrile, she is breathing comfortably.  No distress.  No

chest pain, no cough.  No abdominal pain.



PHYSICAL EXAMINATION:

Blood pressure 146/72 with a pulse of 86, temperature 98.3, she is 98% on room air.

General description is an elderly female, up in the bed in no distress.

RESPIRATORY SYSTEM:  Unlabored breathing, clear to auscultation anteriorly.

HEART: S1, S2.  Regular rate and rhythm.

ABDOMEN:  Soft, no tenderness.

EXTREMITIES:  No edema, no swelling.



LABS:

White count 9.7, BUN of 29, creatinine 1.44.



DIAGNOSTIC IMPRESSION AND PLAN:

Patient with right AK stump wound infection with Pseudomonas and MRSA for about two

weeks, covered with IV vancomycin and cefepime.  Local care with wound VAC. Continue

supportive care.





MMODL / IJN: [id number] / Job#: [id number]

## 2020-06-15 NOTE — PN
PROGRESS NOTE



Patient is seen for followup for acute kidney injury.  Renal function is improving

creatinine down to 1.4 from 1.9 on initial admission.  Previous creatinine has been as

high as 2.5 in March of 2020.  The patient is currently maintained on D5 0.9 at 75 mL

an hour.  He has an indwelling Perez catheter with 24 hour urine output at about 3 L.



PHYSICAL EXAMINATION:

On examination today, blood pressure 146/72, heart rate 86 per minute.  He is afebrile.

Examination of the heart S1, S2.

Examination of the lungs, bilateral breath sounds are heard.

Abdomen is soft, nontender.

Examination of lower extremities shows no significant edema.



LABS:

Show sodium 138, potassium 3.7, chloride 109 BUN 29, creatinine 1.4.



ASSESSMENT:

1. Acute kidney injury, acute tubular necrosis, currently improving.

2. Urinary tract infection, maintained on antibiotics.

3. Chronic kidney disease with previous creatinine 1.5 secondary to diabetic

    nephropathy.  The patient has had previous episodes of acute kidney injury in March of 2020.

4. Right above knee amputation  with stump infection maintained on Cefepime.

5. Vancomycin toxicity, currently improved.

6. Metabolic acidosis maintained on oral sodium bicarb which I will decrease.



PLAN:

Encourage increased oral intake.  Continue antibiotics.  Decrease oral sodium bicarb

tomorrow.





MMODL / IJN: [id number] / Job#: [id number]

## 2021-01-02 ENCOUNTER — HOSPITAL ENCOUNTER (EMERGENCY)
Dept: HOSPITAL 47 - EC | Age: 86
Discharge: HOME | End: 2021-01-02
Payer: MEDICARE

## 2021-01-02 VITALS — RESPIRATION RATE: 18 BRPM | TEMPERATURE: 98.6 F

## 2021-01-02 VITALS — DIASTOLIC BLOOD PRESSURE: 84 MMHG | SYSTOLIC BLOOD PRESSURE: 147 MMHG | HEART RATE: 76 BPM

## 2021-01-02 DIAGNOSIS — M19.041: ICD-10-CM

## 2021-01-02 DIAGNOSIS — Z79.82: ICD-10-CM

## 2021-01-02 DIAGNOSIS — M19.042: ICD-10-CM

## 2021-01-02 DIAGNOSIS — E78.5: ICD-10-CM

## 2021-01-02 DIAGNOSIS — Z79.4: ICD-10-CM

## 2021-01-02 DIAGNOSIS — E11.42: ICD-10-CM

## 2021-01-02 DIAGNOSIS — Z85.41: ICD-10-CM

## 2021-01-02 DIAGNOSIS — E07.9: ICD-10-CM

## 2021-01-02 DIAGNOSIS — Z86.14: ICD-10-CM

## 2021-01-02 DIAGNOSIS — M19.09: ICD-10-CM

## 2021-01-02 DIAGNOSIS — Z79.890: ICD-10-CM

## 2021-01-02 DIAGNOSIS — B02.9: Primary | ICD-10-CM

## 2021-01-02 DIAGNOSIS — Z79.899: ICD-10-CM

## 2021-01-02 DIAGNOSIS — L98.8: ICD-10-CM

## 2021-01-02 DIAGNOSIS — Z79.51: ICD-10-CM

## 2021-01-02 DIAGNOSIS — I10: ICD-10-CM

## 2021-01-02 PROCEDURE — 99283 EMERGENCY DEPT VISIT LOW MDM: CPT

## 2021-01-02 PROCEDURE — 96374 THER/PROPH/DIAG INJ IV PUSH: CPT

## 2021-01-02 NOTE — ED
General Adult HPI





- General


Chief complaint: Skin/Abscess/Foreign Body


Stated complaint: rash


Time Seen by Provider: 21 20:10


Source: patient, EMS, RN notes reviewed


Mode of arrival: EMS


Limitations: no limitations





- History of Present Illness


Initial comments: 





86-year-old female with a past medical history of atrial fibrillation, diabetes 

mellitus, hyperlipidemia, hypertension presents to the emergency room for a 

chief complaint of rash and pain.  Apparently patient has had a rash on the left

hip and back for over a week now.  Patient has had increased pain in the past 

day.  She is refusing turns because of this pain.  Nurse reports that when 

patient arrives she had to soak off a dry dressing and the patient had breakdown

under this.  Steroid cream was being applied to area of breakdown at nursing 

home.  Patient has not had any fevers.  She does not have any other pain aside 

from the rash.Patient has no other complaints at this time including shortness 

of breath, chest pain, abdominal pain, nausea or vomiting, headache, or visual 

changes.





- Related Data


                                Home Medications











 Medication  Instructions  Recorded  Confirmed


 


Aspirin 81 mg PO DAILY 16


 


Levothyroxine Sodium [Synthroid] 50 mcg PO QAM 16


 


Pravastatin Sodium [Pravachol] 80 mg PO DAILY@1800 16


 


Sennosides-Docusate Sodium 1 tab PO DAILY 17





[Senokot-S]   


 


Multivitamins, Thera [Multivitamin 1 tab PO DAILY@1800 10/01/18 06/08/20





(formulary)]   


 


Sodium Chloride 5% Ophth Soln 1 drop BOTH EYES BID 10/01/18 06/08/20





[Vahid 128]   


 


Cranberry 450mg 450 mg PO DAILY 19


 


Metoprolol Tartrate [Lopressor] 100 mg PO BID 19


 


hydrALAZINE HCL [Apresoline] 100 mg PO TID 19


 


sitaGLIPtin PHOSPHATE [Januvia] 50 mg PO DAILY@1800 19


 


Cholecalciferol (Vitamin D3) 2,000 unit PO DAILY 19





[Vitamin D3]   


 


Ferrous Sulfate [Feosol] 325 mg PO DAILY 19


 


Fluticasone Nasal Spray [Flonase 1 spr EA NOSTRIL DAILY 19





Nasal Spray]   


 


Loratadine [Claritin] 10 mg PO DAILY 19


 


Acetaminophen [Tylenol Arthritis] 650 mg PO Q6H PRN 20


 


Insulin Glargine [Lantus] 25 unit SQ DAILY@1800 20


 


Insulin Lispro [humaLOG Kwikpen] 10 unit SQ AC-TID 20


 


Insulin Lispro [humaLOG Kwikpen] See Protocol SQ ACHS 20


 


Losartan Potassium 50 mg PO BID 20








                                  Previous Rx's











 Medication  Instructions  Recorded


 


Cefepime HCl [Maxipime] 2 gm IVPB Q24H #14 vial 06/15/20


 


Magnesium Hydroxide [Milk of 2,400 mg PO ONCE PRN  ml 06/15/20





Magnesia Concentrate]  


 


Sodium Bicarbonate Tab 650 mg PO BID  tab 06/15/20


 


Vancomycin 1,000 mg IVPB Q24HR #14 bag 06/15/20


 


Warfarin [Coumadin] 2 mg PO DAILY@1800  tab 06/15/20


 


Cephalexin [Keflex] 500 mg PO BID 7 Days #14 cap 21


 


Gabapentin 300 mg PO TID 3 Days #9 cap 21


 


HYDROcodone/APAP 5-325MG [Norco 1 tab PO Q6HR PRN #10 tab 21





5-325]  


 


valACYclovir HCL [Valtrex] 1,000 mg PO Q8HR #21 tab 21











                                    Allergies











Allergy/AdvReac Type Severity Reaction Status Date / Time


 


No Known Allergies Allergy   Verified 21 21:32














Review of Systems


ROS Statement: 


Those systems with pertinent positive or pertinent negative responses have been 

documented in the HPI.





ROS Other: All systems not noted in ROS Statement are negative.





Past Medical History


Past Medical History: Atrial Fibrillation, Diabetes Mellitus, Hyperlipidemia, 

Hypertension, Thyroid Disorder


Additional Past Medical History / Comment(s): peripheral neuropathy, UTIs, 

urinary incontinence, OA bilateral hands and back, PVD, cervical cancer


History of Any Multi-Drug Resistant Organisms: ESBL, MRSA


Date of last positivie culture/infection: 9/11/20-MRSA; 17 ESBL-E.coli


MDRO Source:: LEG-MRSA; ESBL Urine


Past Surgical History: Hysterectomy, Tonsillectomy


Additional Past Surgical History / Comment(s): R leg bypass surgery, bilateral 

cataract removal


Past Anesthesia/Blood Transfusion Reactions: No Reported Reaction


Past Psychological History: No Psychological Hx Reported


Smoking Status: Never smoker


Past Alcohol Use History: None Reported


Past Drug Use History: None Reported





- Past Family History


  ** Mother


Family Medical History: CVA/TIA


Additional Family Medical History / Comment(s): Mother  in her 70's.





  ** Father


Family Medical History: Hypertension


Additional Family Medical History / Comment(s): Father  in his 70's.





General Exam


Limitations: no limitations


General appearance: alert, in no apparent distress


Head exam: Present: atraumatic, normocephalic, normal inspection


Eye exam: Present: normal appearance, PERRL, EOMI.  Absent: scleral icterus, 

conjunctival injection, periorbital swelling


ENT exam: Present: normal exam, mucous membranes moist


Neck exam: Present: normal inspection, full ROM.  Absent: tenderness, 

meningismus, lymphadenopathy


Respiratory exam: Present: normal lung sounds bilaterally.  Absent: respiratory 

distress, wheezes, rales, rhonchi, stridor


Cardiovascular Exam: Present: regular rate, normal rhythm, normal heart sounds. 

 Absent: systolic murmur, diastolic murmur, rubs, gallop, clicks


GI/Abdominal exam: Present: soft, normal bowel sounds.  Absent: distended, 

tenderness, guarding, rebound, rigid


Back exam: Present: other (Patient has erythematous macular lesions noted in the

 lower left back extending around to the left hip in 1 dermatome but not 

crossing the midline.  Dry crusts noted.  On patient's sacral area there is an 

area of skin breakdown 5 cm x 5 cm.  No evidence of cellulitis or infection at 

this time. )





Course


                                   Vital Signs











  21





  20:29


 


Temperature 98.6 F


 


Pulse Rate 96


 


Respiratory 18





Rate 


 


Blood Pressure 138/72


 


O2 Sat by Pulse 100





Oximetry 














Medical Decision Making





- Medical Decision Making





HPI and physical exam as documented.  Dry gauze was soaked off.  Wet gauze was 

applied.  Patient was ordered a barrier cream.  She'll be started on Valtrex and

 gabapentin as this is likely shingles given it is presenting in 1 dermatome and

 is consistent with a shingles rash.  She will also be started on Norco for pain

 as she is only taking Tylenol at the nursing home.  Patient was given 100 

micrograms of fentanyl in EMS and 15 minutes of Toradol here in the emergency 

room for pain control.





Disposition


Clinical Impression: 


 Shingles, Skin breakdown





Disposition: HOME SELF-CARE


Condition: Good


Instructions (If sedation given, give patient instructions):  Shingles (ED), How

 to Prevent Pressure Injuries (ED)


Additional Instructions: 


Please give Norco for pain.  Please give Valtrex and gabapentin as directed and 

turn patient frequently.  Give antibiotic to prevent infection of this wound.  

Return to the emergency room for worsening symptoms or fevers.


Prescriptions: 


Gabapentin 300 mg PO TID 3 Days #9 cap


Cephalexin [Keflex] 500 mg PO BID 7 Days #14 cap


HYDROcodone/APAP 5-325MG [Norco 5-325] 1 tab PO Q6HR PRN #10 tab


 PRN Reason: Pain


valACYclovir HCL [Valtrex] 1,000 mg PO Q8HR #21 tab


Is patient prescribed a controlled substance at d/c from ED?: No


Referrals: 


Azalea Pardo MD [Primary Care Provider] - 1-2 days


Time of Disposition: 20:47

## 2023-10-02 NOTE — PN
PROGRESS NOTE



Patient is seen for followup for acute kidney injury.  Patient denies any significant

complaints.  Her renal function had been improving.  Serum creatinine was down to 2.47

yesterday.  I do not have any labs today.



PHYSICAL EXAMINATION:

Blood pressure is 127/66, heart rate 86 per minute.  Patient is afebrile.

Examination of the heart, S1, S2.  Examination of the lungs bilateral breath sounds are

heard.  Abdomen is soft, obese, nontender.  Examination of the lower extremities shows

trace edema bilaterally.  CNS exam is grossly intact. Patient moving all 4 extremities.



LABS:

Not available from today, they are pending.



ASSESSMENT:

1. Acute kidney injury, nonoliguric, currently improving.  The patient does have a

    solitary kidney. She is not on any nephrotoxic agents or medications.  The patient

    is maintained on IV fluids at 50 mL an hour, which we can discontinue and her oral

    intake has improved.

2. Chronic kidney disease stage III with previous creatinine 1.28 November 2018

    secondary to diabetic nephropathy and nephrosclerosis.

3. Urinary tract infection, maintained on antibiotics.

4. History of atrial fibrillation.

5. Dyslipidemia.



PLAN:

Discontinue IV fluids.  Check labs today and repeat labs in a.m.  Encourage increased

oral intake.





MMODL / IJN: [id number] / Job#: [id number] Tissue Cultured Epidermal Autograft Text: Because of the size and depth of the defect and to facilitate healing by granulation, a tissue-cultured epidermal autograft was planned.  After prep and local anesthesia, Xenograft material was trimmed to fi the defect and secured circumferentially.

## 2024-02-02 NOTE — XR
Right hip

 

HISTORY: Chronic right hip pain

 

2 views of the right hip

 

Correlation to plain film of the pelvis 2/8/2016

 

Bone mineralization is reduced. Alignment, joint space maintained. There are vascular calcifications 
present, surgical clips present in the medial right thigh. No fracture or dislocation. Technique is s
omewhat limited.

 

IMPRESSION: No fracture or dislocation. No significant arthropathy.
Hide Additional Notes?: No
Detail Level: Zone

## 2024-05-29 NOTE — P.PN
Pharmacy sent letter stating that there is a shortage on the diltiazem and needs a note sent in stating that it is ok to change to other ER forms that are available.  Per RM, that is fine and the patient was notified and RX has been sent in to the pharmacy.    Fidel   Subjective


Progress Note Date: 10/04/18





83-year-old  female patient of Dr. Pardo resides at [facility] with past medical history of atrial fibrillation on Coumadin, diabetes 

mellitus with neuropathy, hyperlipidemia, hypertension, thyroid disorder, 

history of peripheral artery disease status post to right lower extremity 

bypasses by Dr. Tsai, history of critical limb ischemia and nonhealing 

ulcer of right 11th in 2016 history of UTIs, urinary incontinence history of 

MRSA in 2016 presents with swelling and pain in the right lower extremity.  

Patient comes to the ER with significant swelling on involving the right lower 

extremity with erythema for the past few weeks.  Arterial study was done as 

outpatient by Dr. Pardo that suggested worsening of the peripheral artery 

disease.  Venous Doppler was done in the ER to rule out DVT that was negative 

for any occlusion.  Patient continues to complain of significant pain in the 

right lower extremity at the level of groin.  Patient is wheelchair bound and 

is a long-term patient at discharge.  Patient is hard of hearing and is unable 

to provide any history by herself history is obtained by the previous 

documentation and ER documentation.  On evaluation in the ER, where a suggested 

temp of 101, tachycardia 101, blood pressure 120/60 saturating well on room 

air.  Lab obtained suggest leukocytosis of 22.4, hemoglobin 10.3 which is close 

to patient's baseline.  INR obtained suggest 3.1, BNP suggestive sodium 132, 

potassium 5.2, V1 108 which is increased from the baseline, creatinine 2.35 and 

GFR decreased to 21 from 38 urinalysis obtained as suggest some leukocytosis 13

, with positive nitrates and leukocyte esterase. 








10/2: Consults and place with Dr. Napier and Dr. Ross.  Dr. Napier does not 

plan for any intervention at this time. If patient's kidney function improve 

she may benefit from angiogram. Patient does have pulses palpable in her lower 

extremities.  Perez catheter is to be exchanged today.  She has stage II 

pressure ulcers of the buttocks that were present on admission.  She has been 

afebrile since admission.  White count is down to 15.1.  BUN 98 creatinine 

2.44.  She is currently on cefepime and vancomycin that have been changed over 

to Zosyn and vancomycin.  Patient remains pleasantly confused and also suffers 

from difficulty hearing patient communication difficult.  Abdominal pain seems 

to have resolved.  Hip x-ray showed no fracture or dislocation.  CT of the 

abdomen and pelvis shows atrophic right kidney.  Cardiomegaly.  Coronary artery 

disease.  Hiatal hernia.  Renal ultrasound shows atrophic changes to the right 

kidney as on prior exam.  Cystic focus lower pole left kidney is similar to 

prior exam.





10/3: Dr. Ross has added medihoney for local wound care on the right toes.  

Patient has been seen by nephrology and recommends avoiding vancomycin.  

Patient is currently on Zosyn and vancomycin.  Iron level is low and patient 

will be giving Ferrlecit one dose.  She did cough up small blood clot but 

otherwise has not had a cough.  Blood culture and urine culture are in 

progress.  Anticipate discharge back to [facility] tomorrow.





10/4: Nephrology has seen the patient and added and 2 more doses of Ferrlecit 

which will be completed tomorrow.  Urine culture is showing no growth but note 

that this was obtained after 24 hours of antibiotics.  White count is at 12.5, 

hemoglobin 9.4.  Creatinine is down to 1.92.  INR 2.0 and Coumadin will be 

resumed at her normal dose.  Patient remains pleasantly confused..











Objective





- Vital Signs


Vital signs: 


 Vital Signs











Temp  99.2 F   10/04/18 07:00


 


Pulse  94   10/04/18 07:00


 


Resp  16   10/04/18 07:00


 


BP  138/75   10/04/18 07:00


 


Pulse Ox  99   10/04/18 07:00








 Intake & Output











 10/03/18 10/04/18 10/04/18





 18:59 06:59 18:59


 


Intake Total 600  200


 


Output Total 1225 2100 


 


Balance -625 -2100 200


 


Intake:   


 


  Oral 600  200


 


Output:   


 


  Urine 1225 2100 


 


    Uretheral (Perez)  800 


 


Other:   


 


  Voiding Method Indwelling Catheter Indwelling Catheter Indwelling Catheter


 


  # Bowel Movements 1  














- Exam





General appearance: Unable to provide any history, cooperative, moderate acute 

distress, obese





- EENT


Eyes: anicteric sclerae, PERRLA, normal appearance


ENT: Hard of hearing


- Neck


Neck: no lymphadenopathy, normal ROM, no other, no rigidity, no stridor, no 

thyromegaly





- Respiratory


Respiratory: bilateral: CTA, negative: diminished, dullness, rales, rhonchi





- Cardiovascular


Rhythm: regular


Heart sounds: normal: S1, S2


Abnormal Heart Sounds: no systolic murmur, no diastolic murmur, no rub, no S3 

Gallop, no S4 Gallop, no click, no other





- Gastrointestinal


General gastrointestinal: normal bowel sounds, soft





- Integumentary


Integumentary: no rash fourth and fifth right lower extremity digits with the 

necrotic tip with serous drainage and bad odor.  With significant lower 

extremity edema and redness involving the medial side of the thigh and shin 





- Neurologic


Neurologic: CNII-XII intact





- Musculoskeletal


Musculoskeletal: Gait could not be assessed due to generalized weakness, 

strength equal bilaterally decreased in the lower extremity 3+/5





- Psychiatric


Psychiatric: A&O x's2, appropriate affect





- Labs


CBC & Chem 7: 


 10/04/18 08:57





 10/04/18 08:57


Labs: 


 Abnormal Lab Results - Last 24 Hours (Table)











  10/03/18 10/03/18 10/03/18 Range/Units





  10:22 10:22 10:22 


 


WBC   13.2 H   (3.8-10.6)  k/uL


 


RBC   3.48 L   (3.80-5.40)  m/uL


 


Hgb   9.8 L   (11.4-16.0)  gm/dL


 


Hct   30.4 L   (34.0-46.0)  %


 


MCHC     (31.0-37.0)  g/dL


 


PT    22.7 H  (9.0-12.0)  sec


 


INR    2.5 H  (<1.2)  


 


Carbon Dioxide     (22-30)  mmol/L


 


BUN  92 H    (7-17)  mg/dL


 


Creatinine  2.15 H    (0.52-1.04)  mg/dL


 


Glucose  116 H    (74-99)  mg/dL


 


POC Glucose (mg/dL)     (75-99)  mg/dL


 


AST  38 H    (14-36)  U/L


 


Albumin  3.1 L    (3.5-5.0)  g/dL














  10/03/18 10/03/18 10/03/18 Range/Units





  12:33 16:54 20:46 


 


WBC     (3.8-10.6)  k/uL


 


RBC     (3.80-5.40)  m/uL


 


Hgb     (11.4-16.0)  gm/dL


 


Hct     (34.0-46.0)  %


 


MCHC     (31.0-37.0)  g/dL


 


PT     (9.0-12.0)  sec


 


INR     (<1.2)  


 


Carbon Dioxide     (22-30)  mmol/L


 


BUN     (7-17)  mg/dL


 


Creatinine     (0.52-1.04)  mg/dL


 


Glucose     (74-99)  mg/dL


 


POC Glucose (mg/dL)  108 H  164 H  193 H  (75-99)  mg/dL


 


AST     (14-36)  U/L


 


Albumin     (3.5-5.0)  g/dL














  10/04/18 10/04/18 10/04/18 Range/Units





  08:57 08:57 08:57 


 


WBC   12.5 H   (3.8-10.6)  k/uL


 


RBC   3.31 L   (3.80-5.40)  m/uL


 


Hgb   9.4 L   (11.4-16.0)  gm/dL


 


Hct   30.4 L   (34.0-46.0)  %


 


MCHC   30.8 L   (31.0-37.0)  g/dL


 


PT    18.2 H  (9.0-12.0)  sec


 


INR    2.0 H  (<1.2)  


 


Carbon Dioxide  21 L    (22-30)  mmol/L


 


BUN  71 H    (7-17)  mg/dL


 


Creatinine  1.92 H    (0.52-1.04)  mg/dL


 


Glucose  137 H    (74-99)  mg/dL


 


POC Glucose (mg/dL)     (75-99)  mg/dL


 


AST     (14-36)  U/L


 


Albumin  2.9 L    (3.5-5.0)  g/dL








 Microbiology - Last 24 Hours (Table)











 10/02/18 14:21 Urine Culture - Final





 Urine,Catheterized 


 


 10/01/18 12:45 Blood Culture - Preliminary





 Blood    No Growth after 48 hours














Assessment and Plan


Plan: 





#1 sepsis secondary to the catheter associated urinary tract infection.  Perez 

catheter to be changed.  IV antibiotics have been changed to Zosyn and 

daptomycin by Dr. Ross.  Continue to monitor closely.  Urine culture is 

showing no growth.





#2.  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  History of 

peripheral artery disease status post bypass and stenting with Dr. Tsai and 

Dr. Wood.  Consult with Dr. Napier.  No plan for any intervention.  If 

patient kidney function improve she may benefit from angiogram.





#3 paroxysmal atrial fibrillation on Coumadin.  INR 3.1 hold Coumadin today.  

Repeat INR tomorrow





#4 hyperkalemia secondary to acute kidney injury on CKD.  Kayexalate ordered.  

EKG ordered repeat BMP tomorrow hold lisinopril and potassium tablet





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  Status post 

2 L IV fluid continue normal saline at 75 mL per hour.  Hold Lasix, 

hydrochlorothiazide and amiloride.  Hold lisinopril.  Nephrology consult 

appreciated.  CT abdomen and renal ultrasound as above.





#6 UTI urine culture ordered.





#7 diabetes2 with diabetic neuropathy continue Lantus 30 units daily at bedtime 

with 10 units lispro with sliding scale





#8 hypertension controlled on metoprolol  hold Lasix hold hydrochlorothiazide 

and amiloride.  Hold lisinopril for hyper hyperkalemia





#10 DVT prophylaxis INR therapeutic hold Coumadin INR tomorrow





#11 GI prophylaxis Pepcid 20 mg by mouth daily





#12 CODE STATUS full code





#13 right lower quadrant abdominal pain, resolved.  No acute abnormality on CAT 

scan.





14.  Anemia of chronic disease.  Ferrlecit transfusion 3 to be completed 

tomorrow.





Discharge plan: [facility].





Impression and plan of care have been directed as dictated by the signing 

physician.  Candy Kumar nurse practitioner acting as scribe for signing 

physician.

## 2024-11-20 NOTE — P.CONS
History of Present Illness





- Reason for Consult


Consult date: 03/15/19


Multidrug resistant urinary tract infection





- History of Present Illness





This is an 84-year-old  female patient known to ID service as she was 

seen in the past for right foot ulcer as well as sepsis from urinary tract 

infection.  Patient currently resides at [facility] and is 

wheelchair bound.  She is known to have severe peripheral artery disease status 

post to right lower extremity bypasses by Dr. Tsai, history of critical limb

ischemia and nonhealing ulcer of right.  Patient has a chronic Perez catheter in

place.  Patient was brought in to [facility] emergency center 

due to decreased appetite with decreased oral intake for the past 4-5 days.  No 

diarrhea.  Patient is also on diuretics and was having increasing fatigue, 

weakness, low back pain.  She was found to have a creatinine of 3.18 and 

urinalysis was positive for leukoesterase, nitrates and a BBC clumping.  CT of 

the abdomen and pelvis showed right kidney markedly atrophy, hypodense lesion 

extending off the left kidney stable, perinephric edema noted.  No 

hydronephrosis.  Small amount of free fluid in the pelvis and hypodensity could 

represent hemorrhagic component.  Pelvic ultrasound was done.  Patient was seen 

in consultation by Dr. Anderson and acute abdomen was ruled out.  Patient was also 

seen by Dr. Melo and repeat pelvic ultrasound was ordered which revealed 

diffuse shadowing from the pubic bone and possibly additionally from air within 

the urinary bladder as patient has a Perez catheter grading shadowing that was 

questioned to be mass on prior ultrasound.  No pelvic mass seen.  Small amount 

of free fluid within the pelvis as seen on the prior CAT scan.  No further 

workup is needed from Dr. Melo.  She also feels that elevated CA-125 is related

to patient's pleural effusion.  The patient is to be resumed back on Coumadin 

for her atrial fibrillation.  Patient has been afebrile, heart rate running in 

the 80s, blood pressure 127/66, pulse ox 96% on room air.  White count is 16.9, 

hemoglobin 8.9, platelet count 317.  INR is 1.7.   Urine culture has been 

finalized with Klebsiella and Providencia susceptible to Rocephin and thus this 

consult was requested.











Review of Systems


All systems: negative


Constitutional: Reports anorexia, Reports fatigue, Reports poor appetite, 

Reports weakness, Denies chills, Denies fever


Eyes: denies blurred vision, denies pain


Ears, nose, mouth and throat: Denies dysphagia, Denies headache, Denies nasal 

discharge, Denies sore throat, Denies vertigo


Cardiovascular: Denies chest pain, Denies edema, Denies lightheadedness, Denies 

shortness of breath, Denies syncope


Respiratory: Denies cough, Denies cough with sputum, Denies dyspnea, Denies 

excessive sputum, Denies hemoptysis, Denies home oxygen, Denies wheezing


Gastrointestinal: Reports bloating, Reports constipation, Reports loss of 

appetite, Denies abdominal pain, Denies diarrhea, Denies nausea, Denies vomiting


Genitourinary: Denies dysuria, Denies hematuria


Musculoskeletal: Denies myalgias


Integumentary: Denies pruritus, Denies rash, Denies wounds


Neurological: Reports gait dysfunction, Denies aphasia, Denies change in 

mentation, Denies change in speech, Denies numbness, Denies seizures, Denies we

akness


Psychiatric: Denies anxiety, Denies depression


Endocrine: Denies fatigue, Denies weight change





Past Medical History


Past Medical History: Atrial Fibrillation, Diabetes Mellitus, Hyperlipidemia, 

Hypertension, Thyroid Disorder


Additional Past Medical History / Comment(s): peripheral neuropathy, UTIs, 

urinary incontinence, OA bilateral hands and back, PVD, cervical cancer


History of Any Multi-Drug Resistant Organisms: MRSA


Year Discovered:: 2016


MDRO Source:: RIGHT FOOT


Past Surgical History: Hysterectomy, Tonsillectomy


Additional Past Surgical History / Comment(s): R leg bypass surgery, bilateral 

cataract removal


Past Anesthesia/Blood Transfusion Reactions: No Reported Reaction


Past Psychological History: No Psychological Hx Reported


Additional Psychological History / Comment(s): Pt states she lives at [facility].  States uses a wheelchair to get around.


Smoking Status: Never smoker


Past Alcohol Use History: None Reported


Additional Past Alcohol Use History / Comment(s): Patient resides at [facility] and is wheelchair bound.


Past Drug Use History: None Reported





- Past Family History


  ** Mother


Family Medical History: CVA/TIA


Additional Family Medical History / Comment(s): Mother  in her 70's.





  ** Father


Family Medical History: Hypertension


Additional Family Medical History / Comment(s): Father  in his 70's.





Medications and Allergies


                                Home Medications











 Medication  Instructions  Recorded  Confirmed  Type


 


Aspirin 81 mg PO HS 16 History


 


Levothyroxine Sodium [Synthroid] 50 mcg PO QAM 16 History


 


Pravastatin Sodium [Pravachol] 80 mg PO HS 16 History


 


Insulin Glargine [Lantus] 26 unit SQ HS 17 History


 


INSULIN LISPRO (humaLOG) [humaLOG] 10 units SQ AC-TID 17 History


 


Sennosides-Docusate Sodium 1 tab PO DAILY 17 History





[Senokot-S]    


 


Multivitamins, Thera [Multivitamin 1 tab PO DAILY 10/01/18 03/12/19 History





(formulary)]    


 


Potassium Chloride ER [K-Dur 10] 10 meq PO DAILY 10/01/18 03/12/19 History


 


Sodium Chloride 5% Ophth Soln 1 drop BOTH EYES BID 10/01/18 03/12/19 History





[Vahid 128]    


 


Furosemide [Lasix] 40 mg PO DAILY #0 10/05/18 03/12/19 Rx


 


Amino Acids/Protein Hydrolys 30 ml PO DAILY 18 History





[Pro-Stat Supplement]    


 


amLODIPine BESYLATE 5 mg PO DAILY 18 History


 


Spironolactone [Aldactone] 12.5 mg PO DAILY 18 History


 


Cranberry 450mg 1 tab PO DAILY 19 History


 


Losartan Potassium 100 mg PO DAILY 19 History


 


Metoprolol Tartrate [Lopressor] 100 mg PO BID 19 History


 


Warfarin [Coumadin] 2.5 mg PO HS 19 History


 


hydrALAZINE HCL [Apresoline] 100 mg PO TID 19 History


 


sitaGLIPtin PHOSPHATE [Januvia] 50 mg PO HS 19 History


 


cefTRIAXone [Rocephin] 1 gm IVPB Q24HR #7 vial 03/15/19  Rx








                                    Allergies











Allergy/AdvReac Type Severity Reaction Status Date / Time


 


No Known Allergies Allergy   Verified 19 13:16














Physical Exam


Vitals: 


                                   Vital Signs











  Temp Pulse Resp BP Pulse Ox


 


 03/15/19 12:00  97.1 F L  83  20  136/73  100


 


 03/15/19 08:00  97.8 F  86  21  127/66  96


 


 03/15/19 04:00  98.4 F  89  17  155/74  96


 


 03/15/19 00:00  97.4 F L  83  17  150/74  97


 


 19 20:00  98.8 F  96  19  154/68  94 L


 


 19 16:00  99.1 F  100  20  131/65  100








                                Intake and Output











 03/14/19 03/15/19 03/15/19





 22:59 06:59 14:59


 


Intake Total 620 400 


 


Output Total  1500 


 


Balance 620 -1100 


 


Intake:   


 


  Intake, IV Titration 500 400 





  Amount   


 


    Sodium Chloride 0.9% 1, 400 400 





    000 ml @ 50 mls/hr IV .   





    Q20H URI Rx#:[id number]   


 


    cefTRIAXone 1 gm In 100  





    Sodium Chloride 0.9% 50   





    ml @ 100 mls/hr IVPB   





    Q24HR URI Rx#:[id number]   


 


  Oral 120  


 


Output:   


 


  Urine  1500 


 


Other:   


 


  Voiding Method Indwelling Catheter Indwelling Catheter 


 


  Weight  94.5 kg 














Gen: This is an 84-year-old obese  female.  She is resting in bed 

appears to be comfortable and in no acute distress.


HEENT: Head is atraumatic, normocephalic. Pupils equal, round. Sclerae is 

anicteric. 


NECK: Supple. No JVD. No lymphadenopathy. No thyromegaly. 


LUNGS: Clear to auscultation. No wheezes or rhonchi.  No intercostal ret

ractions.


HEART: Irregular rate and rhythm. No murmur. 


ABDOMEN: Soft. Obese. Bowel sounds are present. No masses.  No tenderness.


EXTREMITIES: No pedal edema.  No calf tenderness.  


NEUROLOGICAL: Patient is awake, alert and oriented to person and place.  

Generalized weakness. 





Results


CBC & Chem 7: 


                                 03/15/19 06:56





                                 03/15/19 06:56


Labs: 


                  Abnormal Lab Results - Last 24 Hours (Table)











  03/14/19 03/14/19 03/15/19 Range/Units





  16:42 20:43 05:53 


 


WBC     (3.8-10.6)  k/uL


 


RBC     (3.80-5.40)  m/uL


 


Hgb     (11.4-16.0)  gm/dL


 


Hct     (34.0-46.0)  %


 


RDW     (11.5-15.5)  %


 


Neutrophils # (Manual)     (1.3-7.7)  k/uL


 


Monocytes # (Manual)     (0-1.0)  k/uL


 


Metamyelocytes # (Man)     (0)  k/uL


 


Myelocytes # (Manual)     (0)  k/uL


 


PT     (9.0-12.0)  sec


 


INR     (<1.2)  


 


Carbon Dioxide     (22-30)  mmol/L


 


BUN     (7-17)  mg/dL


 


Creatinine     (0.52-1.04)  mg/dL


 


POC Glucose (mg/dL)  201 H  231 H  65 L  (75-99)  mg/dL














  03/15/19 03/15/19 03/15/19 Range/Units





  06:15 06:56 06:56 


 


WBC    16.9 H  (3.8-10.6)  k/uL


 


RBC    3.35 L  (3.80-5.40)  m/uL


 


Hgb    8.9 L  (11.4-16.0)  gm/dL


 


Hct    28.8 L  (34.0-46.0)  %


 


RDW    16.8 H  (11.5-15.5)  %


 


Neutrophils # (Manual)    12.50 H  (1.3-7.7)  k/uL


 


Monocytes # (Manual)    1.86 H  (0-1.0)  k/uL


 


Metamyelocytes # (Man)    0.17 H  (0)  k/uL


 


Myelocytes # (Manual)    0.34 H  (0)  k/uL


 


PT   17.1 H   (9.0-12.0)  sec


 


INR   1.7 H   (<1.2)  


 


Carbon Dioxide     (22-30)  mmol/L


 


BUN     (7-17)  mg/dL


 


Creatinine     (0.52-1.04)  mg/dL


 


POC Glucose (mg/dL)  70 L    (75-99)  mg/dL














  03/15/19 03/15/19 03/15/19 Range/Units





  06:56 11:16 11:40 


 


WBC     (3.8-10.6)  k/uL


 


RBC     (3.80-5.40)  m/uL


 


Hgb     (11.4-16.0)  gm/dL


 


Hct     (34.0-46.0)  %


 


RDW     (11.5-15.5)  %


 


Neutrophils # (Manual)     (1.3-7.7)  k/uL


 


Monocytes # (Manual)     (0-1.0)  k/uL


 


Metamyelocytes # (Man)     (0)  k/uL


 


Myelocytes # (Manual)     (0)  k/uL


 


PT     (9.0-12.0)  sec


 


INR     (<1.2)  


 


Carbon Dioxide  21 L    (22-30)  mmol/L


 


BUN  69 H    (7-17)  mg/dL


 


Creatinine  2.04 H    (0.52-1.04)  mg/dL


 


POC Glucose (mg/dL)   64 L  73 L  (75-99)  mg/dL








                      Microbiology - Last 24 Hours (Table)











 19 14:45 Urine Culture - Final





 Urine,Catheterized    Providencia stuartii





    Klebsiella oxytoca


 


 19 13:00 Blood Culture - Preliminary





 Blood    No Growth after 48 hours














Assessment and Plan


Plan: 





This is an 84-year-old  female who presented to the hospital with 

sepsis secondary to catheter associated Klebsiella and Providencia urinary tract

 infection susceptible to Rocephin.  We will plan to order a midline to be 

placed today and patient can be discharged back to nursing home with plan for a 

7 day course of Rocephin.  Repeat lab work in 1 week.  Continue supportive care.





The above dictated assessment and findings were discussed with Dr. Ross.  The 

impression and plan of care have been directed as dictated.  Candy Kumar nurse 

practitioner acting as scribe for Dr. Ross. none